# Patient Record
Sex: FEMALE | Race: WHITE | NOT HISPANIC OR LATINO | Employment: STUDENT | URBAN - METROPOLITAN AREA
[De-identification: names, ages, dates, MRNs, and addresses within clinical notes are randomized per-mention and may not be internally consistent; named-entity substitution may affect disease eponyms.]

---
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
## 2020-01-02 LAB
DONOR IDENTIFICATION: NORMAL
DSA COMMENTS: NORMAL
DSA PRESENT: NO
DSA TEST METHOD: NORMAL
ORGAN: NORMAL
SA1 CELL: NORMAL
SA1 COMMENTS: NORMAL
SA1 HI RISK ABY: NORMAL
SA1 MOD RISK ABY: NORMAL
SA1 TEST METHOD: NORMAL
SA2 CELL: NORMAL
SA2 COMMENTS: NORMAL
SA2 HI RISK ABY UA: NORMAL
SA2 MOD RISK ABY: NORMAL
SA2 TEST METHOD: NORMAL
UNACCEPTABLE ANTIGEN: NORMAL

## 2020-01-10 LAB — INTERPRETATION ECG - MUSE: NORMAL

## 2020-03-16 ENCOUNTER — TELEPHONE (OUTPATIENT)
Dept: PEDIATRIC CARDIOLOGY | Facility: CLINIC | Age: 20
End: 2020-03-16

## 2020-03-16 DIAGNOSIS — Z94.1 HEART REPLACED BY TRANSPLANT (H): Primary | ICD-10-CM

## 2020-03-16 NOTE — LETTER
March 23, 2020    From: [facility]  Solid Organ Transplant Services      To Whom It May Concern:     Laurel Santoyo is a 19 year old year old young adult who underwent heart transplant and is required to take immunosuppressive medication.  As such she currently fits into a  high risk  category for COVID-19.     For parents that work outside the home, we recommend that all reasonable accommodations be made to allow working remotely or modifying work responsibilities to protect Laurel mendoza health.     If you have any questions or concerns about the above request, please contact the Solid Organ Transplant Office at [phone].      Thank you for your partnership.    Sincerely,   [facility]   Solid Organ Transplant Services

## 2020-03-16 NOTE — TELEPHONE ENCOUNTER
I left a message with Laurel to remind her that she should get her EBV and every 3 mo transplant routine labs. I asked her to call back when she goes. The orders are in the [facility] system so she can go to any [facility] or [facility] clinic to have them drawn.

## 2020-03-16 NOTE — LETTER
March 23, 2020    From: [facility]  Solid Organ Transplant Services      To Whom It May Concern:     Laurel Santoyo is a 19 year old year old young adult who underwent heart transplant and is required to take immunosuppressive medication.  As such she currently fits into a  high risk  category for COVID-19.     For parents that work outside the home, we recommend that all reasonable accommodations be made to allow working remotely or modifying work responsibilities to protect Laurel mendoza health.     If you have any questions or concerns about the above request, please contact the Solid Organ Transplant Office at [phone].      Thank you for your partnership.    Sincerely,   [facility]   Solid Organ Transplant Services

## 2020-03-24 NOTE — TELEPHONE ENCOUNTER
I sent Ing another email to reiterate that Laurel does need labs but we would try to minimize visits overall. Ing said that Laurel will come on 3/25/20.

## 2020-03-24 NOTE — TELEPHONE ENCOUNTER
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 Students aren t doing any work for two weeks, we are going to practice  social distancing  , and we are planning for online instruction for two full weeks. It really makes no sense.    Laurel is home as of last night, and I feel it is an unnecessary risk for Mina and me to go to work right now. Overall, everyone in our home is doing great physically and mentally, and I want to keep it that way. :)    Can you please confirm my gut on this? It sounds like we may need a  doctor s note  if we stay home from the actual school building. I hope you are all well both at your home and at the clinic/hospital.     Thanks for your guidance.    --   Shaneka Santoyo    I sent the following response:  Js,     I appreciate your concern. We are supportive of families in choosing to work remotely since it is in line with CDC and [facility] guidelines to limit the spread of COVID 19. I will work on getting you a letter in the next 2-3 days. We are currently waiting for confirmation of language to use for letters to employers in order to support patients and families. All of our work is going through a command center so we have been told to hold off and that something would be coming soon.     Another thing, I have left a couple of VM for Laurel about getting labs in the past 2 weeks. She was supposed to have a follow up EBV for a positive test when we saw her in December. Now that it is later in the month, I think it would be safer for her to get her EBV and all other transplant testing that we do every 3 months at the same time so then she may not need anything additional until this summer. I think the safest thing would be for her to come to the clinic (rather than a [facility] care location) in the next week for labs, since we  have made restrictions on visitors and are limiting visits to essential. Please let me know if you or she have questions. All of the testing is ordered including a tacrolimus level so she can come whenever works from her.     Thank you and I will be in touch as soon as I have more information.     Freida    Her response:  Freida Cain, for the information.  I know she planned to come in over spring break and did not which was the first week of March. Then, she planned to go last week in [place] when everything started to happen with the uncertainty of the virus and did not. I will give her this information and encourage her to continue to work on both communicating better with you and staying on the lab schedule.     It is a fine line parenting at this point and we are working through it using our family therapist :) Mostly, we are working on helping her integrate all this newness by giving her the space to make a few mistakes but of course stepping in if needed.     I hope you are all doing well at the hospital. We are sending positive vibes!  Best,  Ing    I sent Ing a letter from [employer] on Monday 3/23 per her request. A copy of this letter is in this encounter.     She responded:    Thanks, Freida.   Shortly after I sent you the message, everything started to fall into place pretty quickly. I felt like they weren't taking it seriously enough at first, but I think the gravity of it all changed fast. I should have let you know that no teacher is working at any school now.  We all continue to do very well; I hope you and your family are as well as all the transplant team. It's a lot to process. Last week Mina and I were busy connecting with every single family regarding mental health resources, meals, and technology needs via personal phone calls. Now, on to planning our courses to be online.     I'll let you know if we need anything else, but I hope you don't hear from us except to make her next appointments. We  are grateful we have jobs that we can do from home and that are helping others. We sure appreciate all the medical personnel!  Warmly,   Ing

## 2020-03-25 ENCOUNTER — TELEPHONE (OUTPATIENT)
Dept: PEDIATRIC CARDIOLOGY | Facility: CLINIC | Age: 20
End: 2020-03-25

## 2020-03-25 DIAGNOSIS — Z94.1 HEART REPLACED BY TRANSPLANT (H): ICD-10-CM

## 2020-03-25 LAB
ALBUMIN SERPL-MCNC: 4.2 G/DL (ref 3.4–5)
ALP SERPL-CCNC: 59 U/L (ref 40–150)
ALT SERPL W P-5'-P-CCNC: 20 U/L (ref 0–50)
ANION GAP SERPL CALCULATED.3IONS-SCNC: 7 MMOL/L (ref 3–14)
AST SERPL W P-5'-P-CCNC: 14 U/L (ref 0–35)
BASOPHILS # BLD AUTO: 0 10E9/L (ref 0–0.2)
BASOPHILS NFR BLD AUTO: 0.6 %
BILIRUB SERPL-MCNC: 0.4 MG/DL (ref 0.2–1.3)
BUN SERPL-MCNC: 24 MG/DL (ref 7–30)
CALCIUM SERPL-MCNC: 9.2 MG/DL (ref 8.5–10.1)
CHLORIDE SERPL-SCNC: 104 MMOL/L (ref 96–110)
CMV IGG SERPL QL IA: 0.2 AI (ref 0–0.8)
CMV IGM SERPL QL IA: <0.2 AI (ref 0–0.8)
CO2 SERPL-SCNC: 25 MMOL/L (ref 20–32)
CREAT SERPL-MCNC: 0.74 MG/DL (ref 0.5–1)
DIFFERENTIAL METHOD BLD: ABNORMAL
EBV NA IGG SER QL IA: 0.2 AI (ref 0–0.8)
EBV VCA IGG SER QL IA: >8 AI (ref 0–0.8)
EOSINOPHIL # BLD AUTO: 0.8 10E9/L (ref 0–0.7)
EOSINOPHIL NFR BLD AUTO: 12.1 %
ERYTHROCYTE [DISTWIDTH] IN BLOOD BY AUTOMATED COUNT: 12.4 % (ref 10–15)
GFR SERPL CREATININE-BSD FRML MDRD: >90 ML/MIN/{1.73_M2}
GLUCOSE SERPL-MCNC: 104 MG/DL (ref 70–99)
HCT VFR BLD AUTO: 42.6 % (ref 35–47)
HGB BLD-MCNC: 14.1 G/DL (ref 11.7–15.7)
IMM GRANULOCYTES # BLD: 0 10E9/L (ref 0–0.4)
IMM GRANULOCYTES NFR BLD: 0 %
LYMPHOCYTES # BLD AUTO: 1.3 10E9/L (ref 0.8–5.3)
LYMPHOCYTES NFR BLD AUTO: 19.4 %
MAGNESIUM SERPL-MCNC: 1.7 MG/DL (ref 1.6–2.3)
MCH RBC QN AUTO: 28.6 PG (ref 26.5–33)
MCHC RBC AUTO-ENTMCNC: 33.1 G/DL (ref 31.5–36.5)
MCV RBC AUTO: 86 FL (ref 78–100)
MONOCYTES # BLD AUTO: 0.9 10E9/L (ref 0–1.3)
MONOCYTES NFR BLD AUTO: 12.9 %
NEUTROPHILS # BLD AUTO: 3.7 10E9/L (ref 1.6–8.3)
NEUTROPHILS NFR BLD AUTO: 55 %
NRBC # BLD AUTO: 0 10*3/UL
NRBC BLD AUTO-RTO: 0 /100
NT-PROBNP SERPL-MCNC: 70 PG/ML (ref 0–125)
PHOSPHATE SERPL-MCNC: 3.6 MG/DL (ref 2.5–4.5)
PLATELET # BLD AUTO: 239 10E9/L (ref 150–450)
POTASSIUM SERPL-SCNC: 4 MMOL/L (ref 3.4–5.3)
PROT SERPL-MCNC: 8.8 G/DL (ref 6.8–8.8)
RBC # BLD AUTO: 4.93 10E12/L (ref 3.8–5.2)
SODIUM SERPL-SCNC: 136 MMOL/L (ref 133–144)
TACROLIMUS BLD-MCNC: 6.1 UG/L (ref 5–15)
TME LAST DOSE: NORMAL H
TROPONIN I SERPL-MCNC: <0.015 UG/L (ref 0–0.04)
WBC # BLD AUTO: 6.8 10E9/L (ref 4–11)

## 2020-03-25 PROCEDURE — 86645 CMV ANTIBODY IGM: CPT | Performed by: PEDIATRICS

## 2020-03-25 PROCEDURE — 86664 EPSTEIN-BARR NUCLEAR ANTIGEN: CPT | Performed by: PEDIATRICS

## 2020-03-25 PROCEDURE — 86644 CMV ANTIBODY: CPT | Performed by: PEDIATRICS

## 2020-03-25 PROCEDURE — 80197 ASSAY OF TACROLIMUS: CPT | Performed by: PEDIATRICS

## 2020-03-25 PROCEDURE — 86665 EPSTEIN-BARR CAPSID VCA: CPT | Mod: 59 | Performed by: PEDIATRICS

## 2020-03-25 PROCEDURE — 84484 ASSAY OF TROPONIN QUANT: CPT | Performed by: PEDIATRICS

## 2020-03-25 PROCEDURE — 86665 EPSTEIN-BARR CAPSID VCA: CPT | Performed by: PEDIATRICS

## 2020-03-25 PROCEDURE — 83880 ASSAY OF NATRIURETIC PEPTIDE: CPT | Performed by: PEDIATRICS

## 2020-03-25 PROCEDURE — 36415 COLL VENOUS BLD VENIPUNCTURE: CPT | Performed by: PEDIATRICS

## 2020-03-25 PROCEDURE — 83735 ASSAY OF MAGNESIUM: CPT | Performed by: PEDIATRICS

## 2020-03-25 PROCEDURE — 80053 COMPREHEN METABOLIC PANEL: CPT | Performed by: PEDIATRICS

## 2020-03-25 PROCEDURE — 84100 ASSAY OF PHOSPHORUS: CPT | Performed by: PEDIATRICS

## 2020-03-25 PROCEDURE — 85025 COMPLETE CBC W/AUTO DIFF WBC: CPT | Performed by: PEDIATRICS

## 2020-03-25 PROCEDURE — 87799 DETECT AGENT NOS DNA QUANT: CPT | Performed by: PEDIATRICS

## 2020-03-26 LAB
CMV DNA SPEC NAA+PROBE-ACNC: NORMAL [IU]/ML
CMV DNA SPEC NAA+PROBE-LOG#: NORMAL {LOG_IU}/ML
EBV DNA # SPEC NAA+PROBE: ABNORMAL {COPIES}/ML
EBV DNA SPEC NAA+PROBE-LOG#: 5.2 {LOG_COPIES}/ML
SPECIMEN SOURCE: NORMAL

## 2020-03-26 NOTE — TELEPHONE ENCOUNTER
I called Laurel to discuss her lab results with her. Her CMP is unremarkable. Her BNP and troponin are low and she has stable WBC and hemoglobin. Her tacrolimus level is 6.1 for a 12 hour trough. She can continue to take 2 mg twice daily.     I also emailed Ing an update as she and Laurel are working together on managing her medications and appointments for the time being.    Ing,     I just went over results with Laurel but good news. Her cardiac markers are low which means her heart is doing well. Her tacrolimus level is 6.1 so she can continue on 2 mg (4 pills) twice daily. The EBV is continuing to be positive but at similar levels to December so we have no plans for additional testing or changes right now. Her viral copies were 138,000 in Dec and 143,000 now. 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 I already reviewed these with her awhile back, but we should be notified of any new fevers, aches, chills, or prominent lymph nodes.     Unless things change in the meantime, we will plan to see her in late June for her semi-annual follow up.     Thanks!    Freida

## 2020-03-27 LAB — EBV VCA IGM SER QL IA: 0.4 AI (ref 0–0.8)

## 2020-04-06 DIAGNOSIS — Z94.1 HEART REPLACED BY TRANSPLANT (H): ICD-10-CM

## 2020-04-06 DIAGNOSIS — E55.9 VITAMIN D INSUFFICIENCY: ICD-10-CM

## 2020-04-06 RX ORDER — CHOLECALCIFEROL (VITAMIN D3) 50 MCG
2000 TABLET ORAL DAILY
Qty: 90 TABLET | Refills: 3 | Status: SHIPPED | OUTPATIENT
Start: 2020-04-06 | End: 2021-07-20

## 2020-04-28 DIAGNOSIS — Z94.1 HEART TRANSPLANTED (H): ICD-10-CM

## 2020-04-28 RX ORDER — TACROLIMUS 0.5 MG/1
2 CAPSULE ORAL 2 TIMES DAILY
Qty: 720 CAPSULE | Refills: 3 | Status: SHIPPED | OUTPATIENT
Start: 2020-04-28 | End: 2021-03-05

## 2020-06-05 ENCOUNTER — TELEPHONE (OUTPATIENT)
Dept: PEDIATRIC CARDIOLOGY | Facility: CLINIC | Age: 20
End: 2020-06-05

## 2020-06-05 DIAGNOSIS — Z94.1 HEART TRANSPLANTED (H): Primary | ICD-10-CM

## 2020-06-05 NOTE — TELEPHONE ENCOUNTER
Laurel is due for her annual follow up (off schedule now due to college schedule). She called to set this up. I sent a request to Yanely to schedule Laurel later this month or the first 2 weeks of July.     All labs and imaging are ordered.

## 2020-06-15 DIAGNOSIS — K02.9 DENTAL CARIES: ICD-10-CM

## 2020-06-15 RX ORDER — AMOXICILLIN 500 MG/1
CAPSULE ORAL
Qty: 4 CAPSULE | Refills: 0 | Status: SHIPPED | OUTPATIENT
Start: 2020-06-15

## 2020-06-15 NOTE — TELEPHONE ENCOUNTER
"Requested Prescriptions   Pending Prescriptions Disp Refills     amoxicillin (AMOXIL) 500 MG capsule [Pharmacy Med Name: AMOXICILLIN 500MG CAPSULES]  Last Written Prescription Date:  12/11/2019  Last Fill Quantity: 20 tablet,  # refills: 0   Last office visit: 12/13/2019 with prescribing provider:  Dr. Farah   Future Office Visit:           4 capsule 0     Sig: TAKE 4 CAPSULES BY MOUTH ONCE FOR 1 DOSE, 30-60 MINUTES PRIOR TO DENTAL CLEANING       Oral Acne/Rosacea Medications Protocol Failed - 6/15/2020  1:22 PM        Failed - Confirmation of diagnosis is required     Please confirm diagnosis is acne or rosacea.     If NOT acne or rosacea; refer request to provider for further evaluation.    If diagnosis IS acne or rosacea, OK to refill BASED ON PREVIOUS REFILL CLINICAL NOTE RECOMMENDATION.          Failed - Medication is active on med list        Passed - Patient is 12 years of age or older        Passed - Recent (12 mo) or future (30 days) visit within the authorizing provider's specialty     Patient has had an office visit with the authorizing provider or a provider within the authorizing providers department within the previous 12 mos or has a future within next 30 days. See \"Patient Info\" tab in inbasket, or \"Choose Columns\" in Meds & Orders section of the refill encounter.              Passed - No active pregnancy on record        Passed - No positive prenancy test is past 12 months             "

## 2020-06-15 NOTE — TELEPHONE ENCOUNTER
Routing refill request to provider for review/approval because:  Drug not on the [facility] refill protocol     Misty Maldonado RN

## 2020-06-16 NOTE — TELEPHONE ENCOUNTER
I think this was a duplicate request?  I signed one request  Please route to a colleague if it is still needed - thanks  If not you can just close/ done this encounter    Misty Abbasi M.D.

## 2020-06-30 ENCOUNTER — TELEPHONE (OUTPATIENT)
Dept: PEDIATRIC CARDIOLOGY | Facility: CLINIC | Age: 20
End: 2020-06-30

## 2020-06-30 NOTE — TELEPHONE ENCOUNTER
I did Laurel's pre-visit wellness screen. I clarified her appointment start time at 9 AM in [facility]. [facility] like the visits started at 10:30. She should have labs drawn between 10-10:30 since she typically takes her tacrolimus at 10:30 AM/PM now. Laurel verbalized understanding. All orders are entered for labs and imaging.

## 2020-07-02 ENCOUNTER — HOSPITAL ENCOUNTER (OUTPATIENT)
Dept: GENERAL RADIOLOGY | Facility: CLINIC | Age: 20
End: 2020-07-02
Attending: PEDIATRICS
Payer: COMMERCIAL

## 2020-07-02 ENCOUNTER — ANCILLARY PROCEDURE (OUTPATIENT)
Dept: BONE DENSITY | Facility: CLINIC | Age: 20
End: 2020-07-02
Attending: PEDIATRICS
Payer: COMMERCIAL

## 2020-07-02 ENCOUNTER — HOSPITAL ENCOUNTER (OUTPATIENT)
Dept: ULTRASOUND IMAGING | Facility: CLINIC | Age: 20
End: 2020-07-02
Attending: PEDIATRICS
Payer: COMMERCIAL

## 2020-07-02 ENCOUNTER — RESULTS ONLY (OUTPATIENT)
Dept: OTHER | Facility: CLINIC | Age: 20
End: 2020-07-02

## 2020-07-02 ENCOUNTER — TELEPHONE (OUTPATIENT)
Dept: PEDIATRIC CARDIOLOGY | Facility: CLINIC | Age: 20
End: 2020-07-02

## 2020-07-02 ENCOUNTER — OFFICE VISIT (OUTPATIENT)
Dept: PEDIATRIC CARDIOLOGY | Facility: CLINIC | Age: 20
End: 2020-07-02
Attending: PEDIATRICS
Payer: COMMERCIAL

## 2020-07-02 ENCOUNTER — HOSPITAL ENCOUNTER (OUTPATIENT)
Dept: CT IMAGING | Facility: CLINIC | Age: 20
End: 2020-07-02
Attending: PEDIATRICS
Payer: COMMERCIAL

## 2020-07-02 ENCOUNTER — HOSPITAL ENCOUNTER (OUTPATIENT)
Dept: CARDIOLOGY | Facility: CLINIC | Age: 20
End: 2020-07-02
Attending: PEDIATRICS
Payer: COMMERCIAL

## 2020-07-02 VITALS
DIASTOLIC BLOOD PRESSURE: 81 MMHG | RESPIRATION RATE: 24 BRPM | BODY MASS INDEX: 19.36 KG/M2 | OXYGEN SATURATION: 97 % | HEART RATE: 96 BPM | WEIGHT: 116.18 LBS | HEIGHT: 65 IN | SYSTOLIC BLOOD PRESSURE: 107 MMHG

## 2020-07-02 DIAGNOSIS — Z94.1 HEART TRANSPLANTED (H): ICD-10-CM

## 2020-07-02 DIAGNOSIS — Z94.1 HEART REPLACED BY TRANSPLANT (H): ICD-10-CM

## 2020-07-02 DIAGNOSIS — Z94.1 HEART TRANSPLANTED (H): Primary | ICD-10-CM

## 2020-07-02 LAB
ALBUMIN SERPL-MCNC: 3.8 G/DL (ref 3.4–5)
ALP SERPL-CCNC: 57 U/L (ref 40–150)
ALT SERPL W P-5'-P-CCNC: 15 U/L (ref 0–50)
ANION GAP SERPL CALCULATED.3IONS-SCNC: 6 MMOL/L (ref 3–14)
AST SERPL W P-5'-P-CCNC: 10 U/L (ref 0–45)
BASOPHILS # BLD AUTO: 0.1 10E9/L (ref 0–0.2)
BASOPHILS NFR BLD AUTO: 0.9 %
BILIRUB SERPL-MCNC: 0.4 MG/DL (ref 0.2–1.3)
BUN SERPL-MCNC: 12 MG/DL (ref 7–30)
CALCIUM SERPL-MCNC: 9 MG/DL (ref 8.5–10.1)
CHLORIDE SERPL-SCNC: 104 MMOL/L (ref 94–109)
CHOLEST SERPL-MCNC: 120 MG/DL
CK SERPL-CCNC: 46 U/L (ref 30–225)
CMV IGG SERPL QL IA: <0.2 AI (ref 0–0.8)
CMV IGM SERPL QL IA: <0.2 AI (ref 0–0.8)
CO2 SERPL-SCNC: 25 MMOL/L (ref 20–32)
CREAT SERPL-MCNC: 0.67 MG/DL (ref 0.52–1.04)
DIFFERENTIAL METHOD BLD: ABNORMAL
EBV NA IGG SER QL IA: 0.4 AI (ref 0–0.8)
EBV VCA IGG SER QL IA: >8 AI (ref 0–0.8)
EBV VCA IGM SER QL IA: 0.2 AI (ref 0–0.8)
EOSINOPHIL # BLD AUTO: 0.8 10E9/L (ref 0–0.7)
EOSINOPHIL NFR BLD AUTO: 15.2 %
ERYTHROCYTE [DISTWIDTH] IN BLOOD BY AUTOMATED COUNT: 13.1 % (ref 10–15)
GFR SERPL CREATININE-BSD FRML MDRD: >90 ML/MIN/{1.73_M2}
GLUCOSE SERPL-MCNC: 85 MG/DL (ref 70–99)
HCT VFR BLD AUTO: 38.7 % (ref 35–47)
HDLC SERPL-MCNC: 53 MG/DL
HGB BLD-MCNC: 12.8 G/DL (ref 11.7–15.7)
IMM GRANULOCYTES # BLD: 0 10E9/L (ref 0–0.4)
IMM GRANULOCYTES NFR BLD: 0.2 %
INTERPRETATION ECG - MUSE: NORMAL
LDLC SERPL CALC-MCNC: 56 MG/DL
LYMPHOCYTES # BLD AUTO: 1 10E9/L (ref 0.8–5.3)
LYMPHOCYTES NFR BLD AUTO: 18.9 %
MAGNESIUM SERPL-MCNC: 1.6 MG/DL (ref 1.6–2.3)
MCH RBC QN AUTO: 29 PG (ref 26.5–33)
MCHC RBC AUTO-ENTMCNC: 33.1 G/DL (ref 31.5–36.5)
MCV RBC AUTO: 88 FL (ref 78–100)
MONOCYTES # BLD AUTO: 0.4 10E9/L (ref 0–1.3)
MONOCYTES NFR BLD AUTO: 8 %
NEUTROPHILS # BLD AUTO: 3.1 10E9/L (ref 1.6–8.3)
NEUTROPHILS NFR BLD AUTO: 56.8 %
NONHDLC SERPL-MCNC: 67 MG/DL
NRBC # BLD AUTO: 0 10*3/UL
NRBC BLD AUTO-RTO: 0 /100
NT-PROBNP SERPL-MCNC: 181 PG/ML (ref 0–125)
PHOSPHATE SERPL-MCNC: 3.6 MG/DL (ref 2.5–4.5)
PLATELET # BLD AUTO: 275 10E9/L (ref 150–450)
POTASSIUM SERPL-SCNC: 4.2 MMOL/L (ref 3.4–5.3)
PROT SERPL-MCNC: 8.1 G/DL (ref 6.8–8.8)
RBC # BLD AUTO: 4.41 10E12/L (ref 3.8–5.2)
SODIUM SERPL-SCNC: 135 MMOL/L (ref 133–144)
TACROLIMUS BLD-MCNC: 5.3 UG/L (ref 5–15)
TME LAST DOSE: NORMAL H
TRIGL SERPL-MCNC: 54 MG/DL
TROPONIN I SERPL-MCNC: <0.015 UG/L (ref 0–0.04)
WBC # BLD AUTO: 5.4 10E9/L (ref 4–11)

## 2020-07-02 PROCEDURE — 80061 LIPID PANEL: CPT | Performed by: PEDIATRICS

## 2020-07-02 PROCEDURE — 72070 X-RAY EXAM THORAC SPINE 2VWS: CPT

## 2020-07-02 PROCEDURE — 36415 COLL VENOUS BLD VENIPUNCTURE: CPT | Performed by: PEDIATRICS

## 2020-07-02 PROCEDURE — 86833 HLA CLASS II HIGH DEFIN QUAL: CPT | Performed by: PEDIATRICS

## 2020-07-02 PROCEDURE — 76770 US EXAM ABDO BACK WALL COMP: CPT

## 2020-07-02 PROCEDURE — 86664 EPSTEIN-BARR NUCLEAR ANTIGEN: CPT | Performed by: PEDIATRICS

## 2020-07-02 PROCEDURE — 80197 ASSAY OF TACROLIMUS: CPT | Performed by: PEDIATRICS

## 2020-07-02 PROCEDURE — 86665 EPSTEIN-BARR CAPSID VCA: CPT | Performed by: PEDIATRICS

## 2020-07-02 PROCEDURE — 71275 CT ANGIOGRAPHY CHEST: CPT

## 2020-07-02 PROCEDURE — 77080 DXA BONE DENSITY AXIAL: CPT

## 2020-07-02 PROCEDURE — 84100 ASSAY OF PHOSPHORUS: CPT | Performed by: PEDIATRICS

## 2020-07-02 PROCEDURE — G0463 HOSPITAL OUTPT CLINIC VISIT: HCPCS | Mod: 25,ZF

## 2020-07-02 PROCEDURE — 82550 ASSAY OF CK (CPK): CPT | Performed by: PEDIATRICS

## 2020-07-02 PROCEDURE — 83735 ASSAY OF MAGNESIUM: CPT | Performed by: PEDIATRICS

## 2020-07-02 PROCEDURE — 84484 ASSAY OF TROPONIN QUANT: CPT | Performed by: PEDIATRICS

## 2020-07-02 PROCEDURE — 86645 CMV ANTIBODY IGM: CPT | Performed by: PEDIATRICS

## 2020-07-02 PROCEDURE — 87799 DETECT AGENT NOS DNA QUANT: CPT | Performed by: PEDIATRICS

## 2020-07-02 PROCEDURE — 25000128 H RX IP 250 OP 636: Performed by: PEDIATRICS

## 2020-07-02 PROCEDURE — 83880 ASSAY OF NATRIURETIC PEPTIDE: CPT | Performed by: PEDIATRICS

## 2020-07-02 PROCEDURE — 80053 COMPREHEN METABOLIC PANEL: CPT | Performed by: PEDIATRICS

## 2020-07-02 PROCEDURE — 85025 COMPLETE CBC W/AUTO DIFF WBC: CPT | Performed by: PEDIATRICS

## 2020-07-02 PROCEDURE — 25000125 ZZHC RX 250: Performed by: PEDIATRICS

## 2020-07-02 PROCEDURE — 93005 ELECTROCARDIOGRAM TRACING: CPT | Mod: ZF

## 2020-07-02 PROCEDURE — 86644 CMV ANTIBODY: CPT | Performed by: PEDIATRICS

## 2020-07-02 PROCEDURE — 71046 X-RAY EXAM CHEST 2 VIEWS: CPT

## 2020-07-02 PROCEDURE — 72100 X-RAY EXAM L-S SPINE 2/3 VWS: CPT

## 2020-07-02 PROCEDURE — 93306 TTE W/DOPPLER COMPLETE: CPT

## 2020-07-02 PROCEDURE — 86832 HLA CLASS I HIGH DEFIN QUAL: CPT | Performed by: PEDIATRICS

## 2020-07-02 PROCEDURE — 73523 X-RAY EXAM HIPS BI 5/> VIEWS: CPT

## 2020-07-02 RX ORDER — IOPAMIDOL 755 MG/ML
100 INJECTION, SOLUTION INTRAVASCULAR ONCE
Status: COMPLETED | OUTPATIENT
Start: 2020-07-02 | End: 2020-07-02

## 2020-07-02 RX ADMIN — IOPAMIDOL 100 ML: 755 INJECTION, SOLUTION INTRAVENOUS at 11:35

## 2020-07-02 RX ADMIN — SODIUM CHLORIDE 70 ML: 9 INJECTION, SOLUTION INTRAVENOUS at 11:36

## 2020-07-02 ASSESSMENT — MIFFLIN-ST. JEOR: SCORE: 1297.26

## 2020-07-02 ASSESSMENT — PAIN SCALES - GENERAL: PAINLEVEL: NO PAIN (0)

## 2020-07-02 NOTE — NURSING NOTE
It is a pleasure to see Laurel today. She is here on her own. She said that she has been taking care of most of her medical needs in the past 6-8 months and feels good doing so. She has a few concerns.     She has tremors which she thinks are stress and anxiety related but her mom thinks may be due to medication. We discussed this at length. She has tremors and persistent headaches. She thinks they are worse with stress. She has tremors when at rest, with using the computer, and doing fine detail work. When she is painting or writing, it is better.     She saw the dentist and they are concerned about the enamel on her molars. They would like to start with 2 crowns and then may need to do additional. She had questions about precautions if this is ok.    She would like to follow up with gynecology. I will provide her the number to call to set this up. Will also plan for dermatology follow up in December with our follow up visit.

## 2020-07-02 NOTE — NURSING NOTE
"Chief Complaint   Patient presents with     Heart Problem     Heart transplant.     Vitals:    07/02/20 0901   BP: 107/81   BP Location: Right arm   Patient Position: Chair   Pulse: 96   Resp: 24   SpO2: 97%   Weight: 116 lb 2.9 oz (52.7 kg)   Height: 5' 4.96\" (165 cm)      Fartun Licona M.A.  July 2, 2020  "

## 2020-07-02 NOTE — LETTER
2020      RE: Laurel Santoyo  [address] [address]       Misty Abbasi MD   [facility]   [facility] [facility] [place] [address]      RE: Laurel Santoyo  MRN: [id number]  : 2000     Dear Dr. Abbasi:     We had the pleasure of seeing your patient, Laurel Santoyo, in the Pediatric Heart Transplant Clinic at the [facility] on 2020 for her routine post-transplant followup now 16 1/2 years after transplant.  As you know, she is now a 20 year old with a history of complex congenital heart disease s/p multiple palliative surgeries, who underwent orthotopic heart transplant on 2004.      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  She underwent heart cath in 2011 for concern over aortic arch narrowing by echo, and heart cath was complicated by episode of complete heart block requiring cpr with spontaneous recovery of rhythm.  She was found to have both ascending aortic narrowing at anastomotic site as well as mild descending aortic narrowing.  She underwent stent placement in her coarctation on 2012, at which time she was also found to have mild coronary vasculopathy.  She was started on sirolimus (rapamune) on 12 because of the coronary findings, and once her level was therapeutic her tacrolimus level was discontinued.  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  She recovered well after stopping these medications and treating with tpn and antibiotics, and was discharged on 12.      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     Today she is in clinic for routine followup. She reports no concerns on a cardiac or respiratory basis.  She is feeling well, good energy, active. She has had a difficult fall with starting school at [employer], but dealing with a lot of issues with anxiety/depression and so decided to take the fall semester off,  returned to school in January since she was in better mental place but then returned home in March due to Covid. She is now taking some online classes at [employer] and is planning to continue this for fall semester and then go back to [employer] in spring. She continues to have some issue with headaches, anxiety/depression and is working with a therapist. She feels these are probably a result of school stressors as well as stressors with her family. She has not had any fever, abdominal cramps/pain, diarrhea. She does complain of some tremors that are new.  Comprehensive review of systems is otherwise negative today.  There have been no changes to the past medical, family, or social history other than as noted above.     Current medications include:   Prescription Medications as of 7/2/2020       Rx Number Disp Refills Start End Last Dispensed Date Next Fill Date Owning Pharmacy    amoxicillin (AMOXIL) 500 MG capsule  4 capsule 0 6/15/2020    [facility] #[id number] - [place], [place] - [address] AT [facility]    Sig: TAKE 4 CAPSULES BY MOUTH ONCE FOR 1 DOSE, 30-60 MINUTES PRIOR TO DENTAL CLEANING    Class: E-Prescribe    aspirin EC 81 MG EC tablet  30 tablet 6 7/12/2011    [facility]    Sig: Take 1 tablet by mouth daily.    Class: OTC    Route: Oral    FLUoxetine (PROZAC) 20 MG capsule    12/26/2019    [facility] #[id number] - [place], [place] - [address] AT [facility]    Sig: Take 40 mg by mouth daily    Class: Historical    Route: Oral     "lisinopril (PRINIVIL/ZESTRIL) 10 MG tablet  90 tablet 3 10/22/2019    [facility] [address]    Sig: Take 1 tablet (10 mg) by mouth daily    Class: E-Prescribe    Route: Oral    magnesium oxide (MAG-OX) 400 (241.3 Mg) MG tablet  90 tablet 3 12/26/2019    [facility] #[id number] - [place], [place] - [address] AT [facility] [address]    Sig: Take 1 tablet (400 mg) by mouth daily    Class: E-Prescribe    Route: Oral    multivitamin, therapeutic with minerals (MULTI-VITAMIN) TABS tablet  30 each 0 8/24/2017    [facility] [address]    Sig: Take 1 tablet by mouth daily    Class: OTC    Route: Oral    Cosign for Ordering: Accepted by Misty Linton MD on 8/25/2017  7:24 AM    simvastatin (ZOCOR) 5 MG tablet  90 tablet 6 7/19/2019    [facility] [address]    Sig: Take 1 tablet (5 mg) by mouth every evening    Class: E-Prescribe    Route: Oral    tacrolimus (GENERIC EQUIVALENT) 0.5 MG capsule  720 capsule 3 4/28/2020    [facility] [address]    Sig: Take 4 capsules (2 mg) by mouth 2 times daily Take 2 mg (4 capsules) twice daily    Class: E-Prescribe    Route: Oral    vitamin D3 (CHOLECALCIFEROL) 2000 units (50 mcg) tablet  90 tablet 3 4/6/2020    [facility] [address]    Sig: Take 1 tablet (2,000 Units) by mouth daily    Class: E-Prescribe    Route: Oral        On exam today, she is awake, alert, in no acute distress. Vital signs include: /81 (BP Location: Right arm, Patient Position: Chair)   Pulse 96   Resp 24   Ht 1.65 m (5' 4.96\")   Wt 52.7 kg (116 lb 2.9 oz)   SpO2 97%   BMI 19.36 kg/m    Her lungs are clear to auscultation bilaterally with no wheezes, rales or rhonchi.  Cardiovascular exam is notable for a regular rate and rhythm with " a normal S1 and normal physiologically splitting S2.  There is a grade 2/6 systolic ejection murmur at the left upper sternal border and left upper back.  There are no rubs or gallops on exam today.  Abdomen is soft, nontender..  Extremities are warm and well perfused with no peripheral clubbing, edema, or cyanosis.  There is very subtle radial femoral pulse delay with 1+ femoral pulses.  The exam is otherwise unremarkable.       Laurel had evaluation today including labs, imaging, echocardiogram, ecg.   Her echocardiogram showed:  Patient after orthotopic heart transplant. Normal right and left ventricular systolic function. The calculated biplane left ventricular ejection fraction is 62 %. LVRI 0.6. Upper mild mitral valve insufficiency. Mild aortic valve insufficiency. Stent in the abdominal aorta next to the celiac artery. There is blunted systolic upstroke Doppler flow pattern with diastolic run-off in the descending abdominal aorta. In the proximal descending thoracic aorta there is a peak gradient of 32 mm Hg with a mean gradient of 8 mmHg. No pericardial effusion.  No significant change from last echocardiogram.    Her labs returned after her visit:  She had a comprehensive metabolic panel which was normal, specifically the bun was 12 and creatinine of 0.67.  Her magnesium level was normal at 1.6.  Her LFTs were normal.  She had a pro-bnp of 181 and troponin of <0.015.  She has a tacrolimus level that is pending.  She had a cbc remarkable for normal wbc of 5.4, hemoglobin normal at 12.8, and platelets normal at [id number].     Labs from 3/25/20: CMV negative, EBV positive at 425924, log 5.2.  She had ebv and cmv pcr that are pending.     Her cholesterol panel was normal, total cholesterol 120, HDL 53, LDL 56, triglycerides 54.     Her cxr, T&L spine films, hip films were normal today.     CT from today showed:  1. Postoperative changes of cardiac transplantation with stable caliber change at the aortic  anastomosis.  2. Stable migrated aortic stent at the level of the celiac artery without associated thrombus.  3. Stable emphysematous changes in the left lower lobe.    Proximal arch: 2.3 x 2.2 cm  Proximal arch at anastomotic site: 1.6 x 1.3 cm, grossly stable  Mid arch: 1.5 x 1.7 cm   Isthmus: 1.7 x 1.5 cm, previously 1.3 x 1.3 cm  Proximal descending aorta: 2.7 x 2.8 cm  Distal descending aorta: 1.9 x 1.9 cm    Her PRA are as follows:  12/26/2019: no donor specific antibodies  8/12/2019: donor specific antibodies to DR8 ()  3/4/19: donor specific antibodies to DR8 ()  8/16/18: donor specific antibodies to DR8 (MFI 1187)  3/5/18: donor specific antibodies to DR 8 ()  8/24/17: no donor specific antibodies  3/6/17: donor specific antibodies to DR4 (MFI 3707)  8/3/16: donor specific antibodies to DR4 (MFI 2756)  8/10/15: donor specific antibodies to DR4 (MFI 2785), DPB1*04:01 (MFI 1478)  2/12/15: donor specific antibodies to: DR4 mcc=4050, DPB1*04:01 opb=814   8/18/14:  donor specific antibodies to DR4 with MFI 2325 (down from 2/13/14 MFI of 3184), DPB1*04:01 MFI 1537 and B44 [id number].     Her last biopsy was 7/11/11 and showed negative C3d/C4d staining, no evidence of rejection grade 0.      Laurel is now 16 1/2 years out from orthotopic heart transplant, which was performed on February 17, 2004.  She also underwent subaortic membrane resection on November 12, 2008.  She has had no recurrence of her subaortic membrane. She also underwent coarctation stent placement on 8/14/2012, and unfortunately on followup on 11/5/12 was found to have stent migration to abdominal aorta, although not thought to be causing any obstruction to vessels by CT angiography.  She also was diagnosed with severe colitis, likely secondary to cellcept, that has resolved now off cellcept therapy, however coincidentally she was recently converted to rapamune which may have been contributory as well.  We have continued to  maintain her for now off cellcept and rapamune, back on tacrolimus.  Overall she is doing well from a cardiac standpoint at this time with no signs of rejection, with stable mild narrowing in her distal aortic arch. .     Her new tremor is more likely related to her psych medications - will refer back to psychiatry for this and possible medication adjustments.     Recommendations today:  1. Follow Up appt: 6 months with labs and office visit to include ECHO and EKG - Please call Yanely at [phone] to schedule. We will add a dermatology visit to this appointment to do a skin check.  2. Every 3 month transplant labs due September/ early October   3. Dental work: Fine to have crowns done in office. If they would like to do additional surgical work requiring a sedated procedure, then you would need to have it done at [facility] with cardiac anesthesia    4. Tremors: More likely due to anxiety and medication side effects than a cardiac/transplant issue. It is unusual to develop this as a side effect from tacrolimus when you have been on it long term. Please follow up with psychiatry to address this and other concerns.   5. Call [facility] to set up appointment [phone]. Any provider is fine.  6. Transplant office will call you with lab results     Thank you for allowing us to see Laurel and participate in her care.  Please let us know if you have any questions.     Diagnosis summary:  1. 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 Original diagnosis: double inlet left ventricle, d-TGA                     1. S/p PA band and tricuspid valve repair (6/2000)                     2. Developed subaortic obstruction and underwent DKS, atrial septectomy, AV valve repair and BT shunt placement, postoperative ECMO (2/2001)                     3. Shunt revision and PA plasty with postoperative ECMO (8/2001)           B. Had ongoing moderate to severe AV valve  regurgitation and was felt to be poor single ventricle                            candidate so was referred to transplant  2. 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 S/p resection (11/12/2008)  3. Aortic arch narrowing            A. S/p stent placement (8/14/2012), stent found on 11/5/12 to have migrated to abdominal aorta  4. 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 cellcept induced colitis (diagnosed 11/5/12), now resolved off cellcept.  6. Iron deficiency anemia (diagnosed 8/3/16, resolved but now new anemia again summer 2019 )  7. EBV viremia (diagnosed December 2019, stable level March 2020)      Sincerely,      Misty Linton M.D.,    in Pediatrics        CC  Patient Care Team:  Misty Abbasi MD as PCP - General (Pediatrics)  Amelie, Andrews Mcgrath, PhD LP (Neuropsychology)  Lynn Lee MD as Physician  Sofi, Olya Chaparro MD as Assigned PCP  Yanely Manning MA as Medical Assistant (Transplant Surgery)    Copy to patient  CARLITA BASURTO  [address] [address]

## 2020-07-02 NOTE — PROGRESS NOTES
Misty Abbasi MD   [facility]   [address] [place] [id number]      RE: Laurel Santoyo  MRN: [id number]  : 2000     Dear Dr. Abbasi:     We had the pleasure of seeing your patient, Laurel Santoyo, in the Pediatric Heart Transplant Clinic at the [facility] on 2020 for her routine post-transplant followup now 16 1/2 years after transplant.  As you know, she is now a 20 year old with a history of complex congenital heart disease s/p multiple palliative surgeries, who underwent orthotopic heart transplant on 2004.      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  She underwent heart cath in 2011 for concern over aortic arch narrowing by echo, and heart cath was complicated by episode of complete heart block requiring cpr with spontaneous recovery of rhythm.  She was found to have both ascending aortic narrowing at anastomotic site as well as mild descending aortic narrowing.  She underwent stent placement in her coarctation on 2012, at which time she was also found to have mild coronary vasculopathy.  She was started on sirolimus (rapamune) on 12 because of the coronary findings, and once her level was therapeutic her tacrolimus level was discontinued.  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  She recovered well after stopping these medications and treating with tpn and antibiotics, and was discharged on 12.      She was diagnosed with EBV viremia in 2019, and has had ongoing EBV Viremia that we are following every 3 months.     Today she  is in clinic for routine followup. She reports no concerns on a cardiac or respiratory basis.  She is feeling well, good energy, active. She has had a difficult fall with starting school at [employer], but dealing with a lot of issues with anxiety/depression and so decided to take the fall semester off,  returned to school in January since she was in better mental place but then returned home in March due to Covid. She is now taking some online classes at [employer] and is planning to continue this for fall semester and then go back to [employer] in spring. She continues to have some issue with headaches, anxiety/depression and is working with a therapist. She feels these are probably a result of school stressors as well as stressors with her family. She has not had any fever, abdominal cramps/pain, diarrhea. She does complain of some tremors that are new.  Comprehensive review of systems is otherwise negative today.  There have been no changes to the past medical, family, or social history other than as noted above.     Current medications include:   Prescription Medications as of 7/2/2020       Rx Number Disp Refills Start End Last Dispensed Date Next Fill Date Owning Pharmacy    amoxicillin (AMOXIL) 500 MG capsule  4 capsule 0 6/15/2020    [id number] - [place], [place] - [address] AT [facility]    Sig: TAKE 4 CAPSULES BY MOUTH ONCE FOR 1 DOSE, 30-60 MINUTES PRIOR TO DENTAL CLEANING    Class: E-Prescribe    aspirin EC 81 MG EC tablet  30 tablet 6 7/12/2011    [facility]    Sig: Take 1 tablet by mouth daily.    Class: OTC    Route: Oral    FLUoxetine (PROZAC) 20 MG capsule    12/26/2019    [id number] - [place], [place] - [address] AT [facility]    Sig: Take 40 mg by mouth daily    Class: Historical    Route: Oral    lisinopril (PRINIVIL/ZESTRIL) 10 MG tablet  90 tablet 3 10/22/2019    [facility] [place] [address]    Sig: Take 1 tablet (10 mg) by mouth daily    Class: E-Prescribe    Route: Oral    magnesium oxide (MAG-OX) 400 (241.3 Mg) MG tablet  90 tablet 3 12/26/2019    [facility][id number] - [place], [place] - [address] AT [facility] [address]    Sig: Take 1 tablet (400 mg) by mouth daily    Class: E-Prescribe    Route: Oral    multivitamin, therapeutic with minerals (MULTI-VITAMIN) TABS tablet  30 each 0 8/24/2017    [facility] [address]    Sig: Take 1 tablet by mouth daily    Class: OTC    Route: Oral    Cosign for Ordering: Accepted by Misty Linton MD on 8/25/2017  7:24 AM    simvastatin (ZOCOR) 5 MG tablet  90 tablet 6 7/19/2019    [facility] [address]    Sig: Take 1 tablet (5 mg) by mouth every evening    Class: E-Prescribe    Route: Oral    tacrolimus (GENERIC EQUIVALENT) 0.5 MG capsule  720 capsule 3 4/28/2020    [facility] [address]    Sig: Take 4 capsules (2 mg) by mouth 2 times daily Take 2 mg (4 capsules) twice daily    Class: E-Prescribe    Route: Oral    vitamin D3 (CHOLECALCIFEROL) 2000 units (50 mcg) tablet  90 tablet 3 4/6/2020    [facility] [address]    Sig: Take 1 tablet (2,000 Units) by mouth daily    Class: E-Prescribe    Route: Oral        On exam today, she is awake, alert, in no acute distress. Vital signs include: /81 (BP Location: Right arm, Patient Position: Chair)   Pulse 96   Resp 24   Ht 1.65 m (5' 4.96\")   Wt 52.7 kg (116 lb 2.9 oz)   SpO2 97%   BMI 19.36 kg/m    Her lungs are clear to auscultation bilaterally with no wheezes, rales or rhonchi.  Cardiovascular exam is notable for a regular rate and rhythm with a normal S1 and normal physiologically splitting S2.  There is a grade 2/6 systolic ejection " murmur at the left upper sternal border and left upper back.  There are no rubs or gallops on exam today.  Abdomen is soft, nontender..  Extremities are warm and well perfused with no peripheral clubbing, edema, or cyanosis.  There is very subtle radial femoral pulse delay with 1+ femoral pulses.  The exam is otherwise unremarkable.       Laurel had evaluation today including labs, imaging, echocardiogram, ecg.   Her echocardiogram showed:  Patient after orthotopic heart transplant. Normal right and left ventricular systolic function. The calculated biplane left ventricular ejection fraction is 62 %. LVRI 0.6. Upper mild mitral valve insufficiency. Mild aortic valve insufficiency. Stent in the abdominal aorta next to the celiac artery. There is blunted systolic upstroke Doppler flow pattern with diastolic run-off in the descending abdominal aorta. In the proximal descending thoracic aorta there is a peak gradient of 32 mm Hg with a mean gradient of 8 mmHg. No pericardial effusion.  No significant change from last echocardiogram.    Her labs returned after her visit:  She had a comprehensive metabolic panel which was normal, specifically the bun was 12 and creatinine of 0.67.  Her magnesium level was normal at 1.6.  Her LFTs were normal.  She had a pro-bnp of 181 and troponin of <0.015.  She has a tacrolimus level that is pending.  She had a cbc remarkable for normal wbc of 5.4, hemoglobin normal at 12.8, and platelets normal at 284550.     Labs from 3/25/20: CMV negative, EBV positive at 176312, log 5.2.  She had ebv and cmv pcr that are pending.     Her cholesterol panel was normal, total cholesterol 120, HDL 53, LDL 56, triglycerides 54.     Her cxr, T&L spine films, hip films were normal today.     CT from today showed:  1. Postoperative changes of cardiac transplantation with stable caliber change at the aortic anastomosis.  2. Stable migrated aortic stent at the level of the celiac artery without associated  thrombus.  3. Stable emphysematous changes in the left lower lobe.    Proximal arch: 2.3 x 2.2 cm  Proximal arch at anastomotic site: 1.6 x 1.3 cm, grossly stable  Mid arch: 1.5 x 1.7 cm   Isthmus: 1.7 x 1.5 cm, previously 1.3 x 1.3 cm  Proximal descending aorta: 2.7 x 2.8 cm  Distal descending aorta: 1.9 x 1.9 cm    Her PRA are as follows:  12/26/2019: no donor specific antibodies  8/12/2019: donor specific antibodies to DR8 ()  3/4/19: donor specific antibodies to DR8 ()  8/16/18: donor specific antibodies to DR8 (MFI 1187)  3/5/18: donor specific antibodies to DR 8 ()  8/24/17: no donor specific antibodies  3/6/17: donor specific antibodies to DR4 (MFI 3707)  8/3/16: donor specific antibodies to DR4 (MFI 2756)  8/10/15: donor specific antibodies to DR4 (MFI 2785), DPB1*04:01 (MFI 1478)  2/12/15: donor specific antibodies to: DR4 ibd=6170, DPB1*04:01 foh=241   8/18/14:  donor specific antibodies to DR4 with MFI 2325 (down from 2/13/14 MFI of 3184), DPB1*04:01 MFI 1537 and B44 QMA693.     Her last biopsy was 7/11/11 and showed negative C3d/C4d staining, no evidence of rejection grade 0.      Laurel is now 16 1/2 years out from orthotopic heart transplant, which was performed on February 17, 2004.  She also underwent subaortic membrane resection on November 12, 2008.  She has had no recurrence of her subaortic membrane. She also underwent coarctation stent placement on 8/14/2012, and unfortunately on followup on 11/5/12 was found to have stent migration to abdominal aorta, although not thought to be causing any obstruction to vessels by CT angiography.  She also was diagnosed with severe colitis, likely secondary to cellcept, that has resolved now off cellcept therapy, however coincidentally she was recently converted to rapamune which may have been contributory as well.  We have continued to maintain her for now off cellcept and rapamune, back on tacrolimus.  Overall she is doing well from a  cardiac standpoint at this time with no signs of rejection, with stable mild narrowing in her distal aortic arch. .     Her new tremor is more likely related to her psych medications - will refer back to psychiatry for this and possible medication adjustments.     Recommendations today:  1. Follow Up appt: 6 months with labs and office visit to include ECHO and EKG - Please call Yanely at [phone] to schedule. We will add a dermatology visit to this appointment to do a skin check.  2. Every 3 month transplant labs due September/ early October   3. Dental work: Fine to have crowns done in office. If they would like to do additional surgical work requiring a sedated procedure, then you would need to have it done at [facility] with cardiac anesthesia    4. Tremors: More likely due to anxiety and medication side effects than a cardiac/transplant issue. It is unusual to develop this as a side effect from tacrolimus when you have been on it long term. Please follow up with psychiatry to address this and other concerns.   5. Call [facility] to set up appointment [phone]. Any provider is fine.  6. Transplant office will call you with lab results     Thank you for allowing us to see Laurel and participate in her care.  Please let us know if you have any questions.     Diagnosis summary:  1. 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 Original diagnosis: double inlet left ventricle, d-TGA                     1. S/p PA band and tricuspid valve repair (6/2000)                     2. Developed subaortic obstruction and underwent DKS, atrial septectomy, AV valve repair and BT shunt placement, postoperative ECMO (2/2001)                     3. Shunt revision and PA plasty with postoperative ECMO (8/2001)           B. Had ongoing moderate to severe AV valve regurgitation and was felt to be poor single ventricle                            candidate so was referred to  transplant  2. 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 S/p resection (11/12/2008)  3. Aortic arch narrowing            A. S/p stent placement (8/14/2012), stent found on 11/5/12 to have migrated to abdominal aorta  4. 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 cellcept induced colitis (diagnosed 11/5/12), now resolved off cellcept.  6. Iron deficiency anemia (diagnosed 8/3/16, resolved but now new anemia again summer 2019 )  7. EBV viremia (diagnosed December 2019, stable level March 2020)      Sincerely,      Misty Linton M.D.,    in Pediatrics        CC  Patient Care Team:  Misty Abbasi MD as PCP - General (Pediatrics)  Misty Linton MD as MD (Pediatric Cardiology)  Misty Abbasi MD as Referring Physician (Pediatrics)  Andrews Kinsey, PhD LP (Neuropsychology)  Lynn Lee MD as Physician (Internal Medicine)  Lynn Lee MD as Physician  Olya Farah MD as Assigned PCP  Yanely Manning MA as Medical Assistant (Transplant Surgery)  MISTY ABBASI    Copy to patient  CARLITA MORGAN SHERINE  [address] [facility] [id number]

## 2020-07-02 NOTE — PATIENT INSTRUCTIONS
Plan:   1. Follow Up appt: 6 months with labs and office visit to include ECHO and EKG - Please call Yanely at [phone] to schedule. We will add a dermatology visit to this appointment to do a skin check.  2. Every 3 month transplant labs due September/ early October   3. Dental work: Fine to have crowns done in office. If they would like to do additional surgical work requiring a sedated procedure, then you would need to have it done at [facility] with cardiac anesthesia    4. Tremors: More likely due to anxiety and medication side effects than a cardiac/transplant issue. It is unusual to develop this as a side effect from tacrolimus when you have been on it long term. Please follow up with psychiatry to address this and other concerns.   5. Call [facility] to set up appointment [phone]. Any provider is fine.  6. Transplant office will call you with lab results     Please call your transplant coordinator at the Transplant office M-F from 8 AM - 4:30 PM if you have future questions/concerns or change in status such as:    Fever above 100.4    High heart rate   Nausea/vomitting   Fatigue or generally feeling unwell  Mary Jo Pope: [phone] or Freida Nunez: [phone].   For after hour and weekend concerns please page on-call transplant coordinator at [phone] Job Code Pager [phone]    For emergencies call 911 AND call Transplant coordinator on-call at [phone] Job Code Pager [phone]

## 2020-07-03 LAB
CMV DNA SPEC NAA+PROBE-ACNC: NORMAL [IU]/ML
CMV DNA SPEC NAA+PROBE-LOG#: NORMAL {LOG_IU}/ML
EBV DNA # SPEC NAA+PROBE: ABNORMAL {COPIES}/ML
EBV DNA SPEC NAA+PROBE-LOG#: 5.7 {LOG_COPIES}/ML
SPECIMEN SOURCE: NORMAL

## 2020-07-03 NOTE — TELEPHONE ENCOUNTER
I called Laurel back to discuss lab and imaging results. Her renal, heart, and liver function appear to be stable on lab work. She has a WBC of 5.4 and her tacrolimus level is 5.3 with a 12 hour trough (timing in Epic incorrect, last dose 7/1 at 2230). Her imaging, including xrays, renal US, and Dexa, show stable bone density and no concerns. Her CTA was stable from last year per Dr Linton and does not warrant any further cardiac work up.     Her EBV PCR is elevated to over 225643, from 143,852 in March. Per Dr Linton, we will recheck in 1 month and refer to Dr Stevens if the level continues to be elevated. I discussed the significant of EBV viremia with Laurel. She was concerned because she saw a friend with mono earlier this week but was socially distant. I told her that this encounter, especially since she was practicing good infection prevention, was unlikely to cause the viral load to increase. I instructed her to call or page with fevers, fatigue, or swollen lymph nodes.

## 2020-07-07 NOTE — TELEPHONE ENCOUNTER
I called and left Laurel a message with her PRA results. She has no DSA. I told her that from a heart perspective, her visit and labs were reassuring but that we would need to continue to monitor the EBV. She will have a repeat level at the beginning of August.

## 2020-08-07 ENCOUNTER — TELEPHONE (OUTPATIENT)
Dept: PEDIATRIC CARDIOLOGY | Facility: CLINIC | Age: 20
End: 2020-08-07

## 2020-08-07 NOTE — LETTER
August 7, 2020    From: [facility]  Solid Organ Transplant Services      To Whom It May Concern:     Laurel Santoyo is a 20 year old year old who underwent heart transplant and is required to take immunosuppressive medication.  As such she currently fits into a  high risk  category for COVID-19. Laurel is currently living with her parents due to COVID-19 and being unable to return to college.    For parents that work outside the home, we recommend that all reasonable accommodations be made to allow working remotely or modifying work responsibilities to protect their child s health.     If you have any questions or concerns about the above request, please contact the Solid Organ Transplant Office at [phone].      Thank you for your partnership.      Sincerely,   [facility]   Solid Organ Transplant Services

## 2020-08-12 NOTE — TELEPHONE ENCOUNTER
I called Laurel to remind her that she is due for repeat EBV testing with an elevated EBV last month. Plan is to refer to heme onc and ID if it remains high. She has not had fevers or any concerns of new lumps or other symptoms. She said that she will go in to get this done (likely at [facility]).     Ing contacted me by email because Laurel will be living at home this fall again. They feel like this is the right choice for her but want to make sure that she is safe. 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 I sent an updated parent letter for work restrictions. I also told her that the letter being sent for patients with considerations about return to school may be helpful for her and Mina. 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

## 2020-08-18 DIAGNOSIS — Z94.1 HEART REPLACED BY TRANSPLANT (H): ICD-10-CM

## 2020-08-18 RX ORDER — SIMVASTATIN 5 MG
5 TABLET ORAL EVERY EVENING
Qty: 90 TABLET | Refills: 3 | Status: SHIPPED | OUTPATIENT
Start: 2020-08-18 | End: 2021-06-24 | Stop reason: ALTCHOICE

## 2020-08-28 ENCOUNTER — TELEPHONE (OUTPATIENT)
Dept: PEDIATRIC CARDIOLOGY | Facility: CLINIC | Age: 20
End: 2020-08-28

## 2020-09-02 DIAGNOSIS — Z94.1 HEART TRANSPLANTED (H): ICD-10-CM

## 2020-09-02 PROCEDURE — 86664 EPSTEIN-BARR NUCLEAR ANTIGEN: CPT | Performed by: PEDIATRICS

## 2020-09-02 PROCEDURE — 86665 EPSTEIN-BARR CAPSID VCA: CPT | Mod: 59 | Performed by: PEDIATRICS

## 2020-09-02 PROCEDURE — 87799 DETECT AGENT NOS DNA QUANT: CPT | Performed by: PEDIATRICS

## 2020-09-02 PROCEDURE — 86665 EPSTEIN-BARR CAPSID VCA: CPT | Performed by: PEDIATRICS

## 2020-09-02 PROCEDURE — 36415 COLL VENOUS BLD VENIPUNCTURE: CPT | Performed by: PEDIATRICS

## 2020-09-03 LAB
EBV DNA # SPEC NAA+PROBE: ABNORMAL {COPIES}/ML
EBV DNA SPEC NAA+PROBE-LOG#: 5.4 {LOG_COPIES}/ML
EBV NA IGG SER QL IA: 0.8 AI (ref 0–0.8)
EBV VCA IGG SER QL IA: >8 AI (ref 0–0.8)
EBV VCA IGM SER QL IA: 0.2 AI (ref 0–0.8)

## 2020-09-04 ENCOUNTER — TELEPHONE (OUTPATIENT)
Dept: PEDIATRIC CARDIOLOGY | Facility: CLINIC | Age: 20
End: 2020-09-04

## 2020-09-04 DIAGNOSIS — Z94.1 HEART REPLACED BY TRANSPLANT (H): Primary | ICD-10-CM

## 2020-09-04 NOTE — TELEPHONE ENCOUNTER
I called Laurel to remind her that she needs labs. I have left 2 messages on her voicemail. I asked her to check if she could find them and let me know if there is a better way to contact her. She said that she can go for labs in the next week.     I also asked her about tacrolimus and refills with specialty pharmacy. We received a message that she had not refilled in over a month and that she did not call back when they left a message. She said that she is taking her tacrolimus twice a day at 4 - 0.5 mg capsules. She has not missed a dose that she knows of but has plenty of medication left. I gave her the transplant number for the pharmacy and reminded her to give enough time for refills. She verbalized understanding.

## 2020-09-04 NOTE — TELEPHONE ENCOUNTER
Carlita called and updated on whole blood EBV PCR results which are down, IgM stable at 0.2. EBV plasma PCR still in process and will update her when results are back.     Left message for Carlita instructing her to call transplant office with questions.     Results for SHERINECARLITA (MRN [id number]) as of 9/4/2020 10:17   Ref. Range 7/2/2020 10:08 9/2/2020 11:38   EBV DNA Copies/mL Latest Ref Range: EBVNEG^EBV DNA Not Detected Copies/mL 549,569 (A) 254,342 (A)   EBV DNA Log of Copies Latest Ref Range: <2.7 Log_copies/mL 5.7 (H) 5.4 (H)       Mary Jo Pope, RN, BSN  Pediatric Heart Transplant, Heart Failure, and VAD Coordinator  [facility]  Phone: [phone]  Pager: [phone]  Heart Transplant Coordinator On-Call: [phone] Job Code Pager [phone]

## 2020-09-08 LAB
DIAGNOSTIC IMP SPEC-IMP: DETECTED
EBV DNA # SPEC NAA+PROBE: ABNORMAL CPY/ML
EBV DNA SPEC NAA+PROBE-LOG#: 3.5 LOG
SPECIMEN SOURCE: ABNORMAL

## 2020-09-09 NOTE — TELEPHONE ENCOUNTER
I left 2 messages for Laurel and one for Ing on 9/8 and 9/9. Laurel called back on 9/9/20. We discussed that her EBV PCR is continued to be elevated but is lower than it was with the last check. Her plasma EBV is also elevated to 2840 which is more concerning. We contacted our EBV/PTLD team, Dr Stevens and Dr Restrepo. They agreed that she probably warrants further work up. They have a joint clinic on Tuesday Sept 15 and October 20. They may recommend additional testing and imaging. I discussed this all with Laurel. She continues to be asymptomatic with no fevers, unexplained symptoms, or enlarged lymph nodes. Laurel is available on Sept 15 so I will have the  contact her. We reviewed that the primary concern for EBV viremia post-transplant is PTLD. Laurel verbalized understanding. I reminded her to call with any questions or concerns.     She is due for transplant labs at the end of September or early October. These are ordered per Dr Linton.

## 2020-09-15 ENCOUNTER — OFFICE VISIT (OUTPATIENT)
Dept: PEDIATRIC HEMATOLOGY/ONCOLOGY | Facility: CLINIC | Age: 20
End: 2020-09-15
Attending: PEDIATRICS
Payer: COMMERCIAL

## 2020-09-15 ENCOUNTER — OFFICE VISIT (OUTPATIENT)
Dept: INFECTIOUS DISEASES | Facility: CLINIC | Age: 20
End: 2020-09-15
Attending: PEDIATRICS
Payer: COMMERCIAL

## 2020-09-15 VITALS
HEIGHT: 65 IN | RESPIRATION RATE: 16 BRPM | TEMPERATURE: 98 F | HEART RATE: 107 BPM | SYSTOLIC BLOOD PRESSURE: 124 MMHG | WEIGHT: 121.25 LBS | DIASTOLIC BLOOD PRESSURE: 87 MMHG | BODY MASS INDEX: 20.2 KG/M2 | OXYGEN SATURATION: 98 %

## 2020-09-15 DIAGNOSIS — Z53.9 ERRONEOUS ENCOUNTER--DISREGARD: Primary | ICD-10-CM

## 2020-09-15 DIAGNOSIS — B27.00 EBV (EPSTEIN-BARR VIRUS) VIREMIA: Primary | ICD-10-CM

## 2020-09-15 PROCEDURE — G0463 HOSPITAL OUTPT CLINIC VISIT: HCPCS | Mod: ZF

## 2020-09-15 ASSESSMENT — MIFFLIN-ST. JEOR: SCORE: 1320.88

## 2020-09-15 ASSESSMENT — PAIN SCALES - GENERAL: PAINLEVEL: NO PAIN (0)

## 2020-09-15 NOTE — NURSING NOTE
Patient to be seen and roomed in [facility]. Please see Dr. Stevens note for vitals and med rec.    Sondra Mejias RN on 9/15/2020 at 7:21 AM

## 2020-09-15 NOTE — NURSING NOTE
"Chief Complaint   Patient presents with     New Patient     Patient is here today for new consult     /87 (BP Location: Right arm, Patient Position: Fowlers, Cuff Size: Adult Regular)   Pulse 107   Temp 98  F (36.7  C) (Oral)   Resp 16   Ht 1.651 m (5' 5\")   Wt 55 kg (121 lb 4.1 oz)   SpO2 98%   BMI 20.18 kg/m      No Pain (0)  Data Unavailable    I have reviewed the patients medications and allergies    Height/weight double check needed? No    Brittany Reynoso LPN  September 15, 2020  "

## 2020-09-15 NOTE — LETTER
9/15/2020      RE: Laurel Santoyo  [address] [facility] [address]       [facility]  Pediatric Hematology/Oncology   New EBV Viremia/PTLD Clinic Patient    Laurel Santoyo MRN# [id number]   YOB: 2000 Age: 20 year old      Reason for consultation: We are seeing Laurel Santoyo today at the request of Dr. Linton for persistent EBV viremia with evidence of lytic viral infection.          History of Present Illness:   Laurel Santoyo is a 20 year old young woman with a history of complex congenital heart disease s/p orthotopic heart transplant 2/17/04, aortic stenosis s/p multiple corrective procedures/stents and now persistent EBV viremia who presents to our clinic in consultation for evaluation and monitoring for PTLD. Laurel provided the history in addition to extensive review of her EMR.    Laurel was EBV seronegative post-transplant until 8/16/18 when she was first noted to have detectable EBV capsid IgM and remained capsid IgM positive through 12/26/19 when she was first noted to have detectable whole blood EBV PCR level of 138K. She did not seroconvert to EBV capsid IgG positive until 3/25/20. In the weeks prior to her first detectable EBV PCR level, Laurel was diagnosed with pneumonia and otitis media. She was also noted at that time to have tonsillar exudates and moderate cervical lymphadenopathy on exam consistent with a mono-like syndrome. These symptoms have all subsequently resolved.     Her whole blood EBV PCR level has remained over 100K persistently for the last year with a peak of 549K on 7/2/20, but improvement to 254K at her most recent check on 9/2/20. Despite this trend in her whole blood EBV PCR level, she has not had any other symptoms concerning for development of PTLD. She denies unexplained fevers, ill symptoms such as runny nose, sore throat, or cough, abdominal pain, unintentional weight loss, GI upset, nausea,  vomiting, or  unusual lumps or bumps. She does have chronic fatigue that waxes and wanes which has been continuing to follow the usual pattern of worsening when she is more depressed.         Review of Systems:   Pertinent positives reported in the HPI. All other systems in a complete and comprehensive review of systems were negative.          Past Medical History:     Past Medical History:   Diagnosis Date     Heart transplant, orthotopic, status  2012     HLHS (hypoplastic left heart syndrome) 2007     HLHS (hypoplastic left heart syndrome) 2007    Hx of Heart Transplant in 2004 at age 3 years 2008 sub-aortic membrane removal      PONV (postoperative nausea and vomiting)      Post-operative aortic arch obstruction 2011     s/p heart transplant 2011          Past Surgical History:     Past Surgical History:   Procedure Laterality Date     ANGIOGRAPHY  2012    Procedure: ANGIOGRAPHY;;  Surgeon: Melanie Arias MD;  Location: [facility] OR     C TRANSPLANTATION OF HEART  2004     HEART CATH CHILD  2011    Procedure:HEART CATH CHILD; Right Left, Biopsy and Possible Coarct Stent; Surgeon:MELANIE ARIAS; Location:[facility] OR     HEART CATH CHILD  2012    Procedure: HEART CATH CHILD;  Left Heart Cath, Coronary Angiogram with Possible Balloon Dilatation of Aorta;  Surgeon: Melanie Arias MD;  Location: [facility] OR     INSERT PICC LINE  2012    Procedure: INSERT PICC LINE;  Picc Line Placement;  Surgeon: ANNA Bailey MD;  Location: [facility] OR          Social History:     Social History     Social History Narrative    FAMILY INFORMATION     Date: 2007    Parent #1      Name: Shaneka Santoyo   Gender: Female   : 1965      Education: M.A.   Occupation:         Parent #2      Name: Mina Santoyo   Gender: Male   : 1969     Education: M.A.   Occupation:         Siblings:      Vince Natanael                 Gender: Male           : 1998    Denice Santoyo           Gender: Female      : 2002        Relationship Status of Parent(s):     Who does the child live with? Parents, brother, and sister    What language(s) is/are spoken at home? English             -- Laurel reports that she did not return to [employer] this semester given the COVID pandemic, but instead has enrolled in online classes through a local [employer] college.       Family History:     Family History   Problem Relation Age of Onset     Other - See Comments Maternal Grandfather         mental health     Other - See Comments Mother 18        migraine headaches             Immunizations:     Immunization History   Administered Date(s) Administered     Comvax (HIB/HepB) 2000, 2001     DTAP (<7y) 2000, 2000, 2005, 2007     HEPA 2014     HPV 2013, 10/17/2013, 2014     HepA-ped 2 Dose 2018     HepB 2007     Influenza (H1N1) 10/21/2009, 2009     Influenza (IIV3) PF 2000, 10/24/2002, 10/09/2003, 10/06/2004, 2006, 10/19/2007, 10/15/2008, 10/11/2010, 10/03/2011, 10/19/2012     Influenza Vaccine IM 18-49 Yrs, RIV3 10/08/2019     Influenza Vaccine IM > 6 months Valent IIV4 10/17/2013, 10/17/2014, 10/19/2015, 10/21/2016, 2018     Influenza vaccine ages 6-35 months 10/15/2009     MMR 10/11/2001, 2005     Meningococcal (Bexsero ) 2019     Meningococcal (Menactra ) 2013, 2017     Pneumo Conj 13-V (2010&after) 2019     Pneumococcal (PCV 7) 2000, 2000, 2001     Pneumococcal 23 valent 2017     Poliovirus, inactivated (IPV) 2000, 2000, 2005, 2007     Synagis 10/11/2001, 10/09/2003, 2003, 2003, 2003, 2004, 2004, 2004, 2004, 03/10/2005, 2005     TDAP Vaccine (Boostrix) 2012     Varicella 10/11/2001             Allergies:   No  "Known Allergies          Medications:     Current Outpatient Medications   Medication Sig     amoxicillin (AMOXIL) 500 MG capsule TAKE 4 CAPSULES BY MOUTH ONCE FOR 1 DOSE, 30-60 MINUTES PRIOR TO DENTAL CLEANING     aspirin EC 81 MG EC tablet Take 1 tablet by mouth daily.     FLUoxetine (PROZAC) 20 MG capsule Take 40 mg by mouth daily     lisinopril (PRINIVIL/ZESTRIL) 10 MG tablet Take 1 tablet (10 mg) by mouth daily     magnesium oxide (MAG-OX) 400 (241.3 Mg) MG tablet Take 1 tablet (400 mg) by mouth daily     multivitamin, therapeutic with minerals (MULTI-VITAMIN) TABS tablet Take 1 tablet by mouth daily     simvastatin (ZOCOR) 5 MG tablet Take 1 tablet (5 mg) by mouth every evening     tacrolimus (GENERIC EQUIVALENT) 0.5 MG capsule Take 4 capsules (2 mg) by mouth 2 times daily Take 2 mg (4 capsules) twice daily     vitamin D3 (CHOLECALCIFEROL) 2000 units (50 mcg) tablet Take 1 tablet (2,000 Units) by mouth daily     No current facility-administered medications for this visit.             Physical Exam:   /87 (BP Location: Right arm, Patient Position: Fowlers, Cuff Size: Adult Regular)   Pulse 107   Temp 98  F (36.7  C) (Oral)   Resp 16   Ht 1.651 m (5' 5\")   Wt 55 kg (121 lb 4.1 oz)   SpO2 98%   BMI 20.18 kg/m      GENERAL APPEARANCE: healthy, alert and no distress  EYES: Eyes grossly normal to inspection, conjunctivae and sclerae normal, extraocular movements intact  HENT: nose and mouth without ulcers or lesions, oropharynx clear and oral mucous membranes moist  NECK: no adenopathy, no asymmetry, masses  RESP: lungs clear to auscultation - no rales, rhonchi or wheezes  CV: regular rate and rhythm, no peripheral edema and peripheral pulses strong  ABDOMEN: soft, nontender, no hepatosplenomegaly, no masses   MS: no musculoskeletal defects are noted and gait is age appropriate without ataxia  SKIN: no suspicious lesions or rashes  NEURO: Normal strength and tone, sensory exam grossly normal, mentation " intact and speech normal  PSYCH: mentation appears normal and affect normal/bright         Data:   -- Last CBC with diff (7/2/20): 5.4>12.8<275 ANC 3.1    -- Plasma EBV PCR (9/2/20): 2540    -- Whole blood EBV PCR trends since first positive on 12/26/19      CTA chest/abdomen (7/2/20)  FINDINGS:    Chest:  The thyroid and could represent residual thymus, aortic branching  pattern, heart size, pericardium, and esophagus are normal. The  ascending aorta and main pulmonary artery are not dilated. No large  central pulmonary embolism. No lymphadenopathy in the chest. The  central tracheobronchial tree is patent with secretions/debris in the  lower trachea and right mainstem bronchus. No pneumothorax or pleural  effusion. No airspace consolidation. Stable emphysematous changes in  the left lung most pronounced in the left lower lobe.    Postsurgical changes of cardiac transplantation with no significant  change in the areas of narrowing in the aortic arch with aortic  measurements as follows:  Sinuses of Valsalva: 2.9 x 3.0 x 3.0 cm  Sinotubular ridge: 2.6 x 2.6 cm  Ascending aorta: 2.6 x 2.6 cm  Proximal arch: 2.3 x 2.2 cm  Proximal arch at anastomotic site: 1.6 x 1.3 cm, grossly stable  Mid arch: 1.5 x 1.7 cm   Isthmus: 1.7 x 1.5 cm, previously 1.3 x 1.3 cm  Proximal descending aorta: 2.7 x 2.8 cm  Distal descending aorta: 1.9 x 1.9 cm     Abdomen:  Stable position and orientation of the migrated aortic stent, again at  the level of the celiac artery. Aortic origin of an accessory artery  supplying the left gastric and left hepatic distributions. The stent  remains partially compressed and there is no associated thrombus.    The liver, gallbladder, spleen, adrenal glands, pancreas, and kidneys  are unremarkable. No abnormally dilated loops of bowel. No  intra-abdominal free air or free fluid. Unchanged prominent  nonenlarged retroperitoneal lymph nodes.     Bones and soft tissues:   Intact sternotomy wires. No acute or  worrisome osseous lesions.                                                                   IMPRESSION:   1. Postoperative changes of cardiac transplantation with stable caliber change at the aortic anastomosis.  2. Stable migrated aortic stent at the level of the celiac artery without associated thrombus.  3. Stable emphysematous changes in the left lower lobe.     I have personally reviewed the examination and initial interpretation  and I agree with the findings.     LUPE ALAS MD         Assessment and Plan:   Laurel is a 20 year old young woman with a history of complex congenital heart disease s/p orthotopic heart transplant 2/17/04, aortic stenosis s/p multiple corrective procedures/stents and now persistent EBV viremia in her whole blood and plasma who presented to our clinic in consultation for evaluation and monitoring for PTLD.     Laurel was previously EBV negative by serology and PCR until August 2018 when she was first noted to have detectable EBV capsid IgM. She then remained EBV PCR negative and asymptomatic until December 2019 when she was first noted to have detectable whole blood EBV PCR and experienced a mono-like illness. She has since recovered from those symptoms, but continues to have a persistently elevated whole blood EBV PCR levels ranging from 150-550K and more recently noted to have plasma EBV PCR level of 2450.    By history and exam, she does not have any signs or symptoms concerning for PTLD. Additionally, her most recent imaging (CTA chest/abdomen) from 7/2/20 was reviewed and was also reassuring given there were no enlarged lymph nodes or splenomegaly noted in that study.    Given her status as a heart transplant patient on lifelong immunosuppression who recently experienced a primary EBV viral infection, she remains at risk for the development of PTLD. It was reassuring to see her whole blood EBV PCR level decline at the last check on 9/2/20, but she was also found to have an  elevated EBV PCR level in her plasma more suggestive with lytic as opposed to latent viral infection. She requires close follow up to monitor her regularly with labs and serial exams.    -- Discussed the diagnosis of PTLD and it's relationship to EBV. Reassured Laurel that there are no current findings that are consistent with PTLD, however she remains at risk for its development.   -- There is no indication at this time for PTLD directed therapy although she may qualify for anti-viral therapy based on Dr. Restrepo's evaluation and recommendations  -- Transplant ID appointment to be rescheduled as a virtual visit in the near future.   -- Plan to have EBV labs redrawn in ~1 month and in person follow up in 3 months; in the interim, advised Laurel to contact our clinic if she does develop any symptoms or physical signs concerning for PTLD such as unexplained fevers, abdominal pain, new lumps or bumps, unintentional weight loss, GI upset, or worsening fatigue. She was provided with our contact information.     This patient was seen by and staffed with Dr. Jd Stevens.    Shannon Sales MD MPH  Pediatric Hematology Oncology Fellow  Pager: [phone]    Physician Attestation    I saw and evaluated the patient. I discussed with the fellow and agree with the findings and plan as documented in the fellow's note.     Jd Stevens MD  Pediatric Hematology/Oncology  [facility]       Jd Stevens MD

## 2020-09-15 NOTE — PROGRESS NOTES
[facility]  Pediatric Hematology/Oncology   New EBV Viremia/PTLD Clinic Patient    Laurel Santoyo MRN# [id number]   YOB: 2000 Age: 20 year old      Reason for consultation: We are seeing Laurel Santoyo today at the request of Dr. Linton for persistent EBV viremia with evidence of lytic viral infection.          History of Present Illness:   Laurel Santoyo is a 20 year old young woman with a history of complex congenital heart disease s/p orthotopic heart transplant 2/17/04, aortic stenosis s/p multiple corrective procedures/stents and now persistent EBV viremia who presents to our clinic in consultation for evaluation and monitoring for PTLD. Laurel provided the history in addition to extensive review of her EMR.    Laurel was EBV seronegative post-transplant until 8/16/18 when she was first noted to have detectable EBV capsid IgM and remained capsid IgM positive through 12/26/19 when she was first noted to have detectable whole blood EBV PCR level of 138K. She did not seroconvert to EBV capsid IgG positive until 3/25/20. In the weeks prior to her first detectable EBV PCR level, Laurel was diagnosed with pneumonia and otitis media. She was also noted at that time to have tonsillar exudates and moderate cervical lymphadenopathy on exam consistent with a mono-like syndrome. These symptoms have all subsequently resolved.     Her whole blood EBV PCR level has remained over 100K persistently for the last year with a peak of 549K on 7/2/20, but improvement to 254K at her most recent check on 9/2/20. Despite this trend in her whole blood EBV PCR level, she has not had any other symptoms concerning for development of PTLD. She denies unexplained fevers, ill symptoms such as runny nose, sore throat, or cough, abdominal pain, unintentional weight loss, GI upset, nausea, vomiting, or  unusual lumps or bumps. She does have chronic fatigue that waxes and wanes  which has been continuing to follow the usual pattern of worsening when she is more depressed.         Review of Systems:   Pertinent positives reported in the HPI. All other systems in a complete and comprehensive review of systems were negative.          Past Medical History:     Past Medical History:   Diagnosis Date     Heart transplant, orthotopic, status  2012     HLHS (hypoplastic left heart syndrome) 2007     HLHS (hypoplastic left heart syndrome) 2007    Hx of Heart Transplant in 2004 at age 3 years 2008 sub-aortic membrane removal      PONV (postoperative nausea and vomiting)      Post-operative aortic arch obstruction 2011     s/p heart transplant 2011          Past Surgical History:     Past Surgical History:   Procedure Laterality Date     ANGIOGRAPHY  2012    Procedure: ANGIOGRAPHY;;  Surgeon: Melanie Arias MD;  Location: [facility] OR     C TRANSPLANTATION OF HEART  2004     HEART CATH CHILD  2011    Procedure:HEART CATH CHILD; Right Left, Biopsy and Possible Coarct Stent; Surgeon:MELANIE ARIAS; Location:[facility] OR     HEART CATH CHILD  2012    Procedure: HEART CATH CHILD;  Left Heart Cath, Coronary Angiogram with Possible Balloon Dilatation of Aorta;  Surgeon: Melanie Arias MD;  Location: [facility] OR     INSERT PICC LINE  2012    Procedure: INSERT PICC LINE;  Picc Line Placement;  Surgeon: ANNA Bailey MD;  Location: [facility] OR          Social History:     Social History     Social History Narrative    FAMILY INFORMATION     Date: 2007    Parent #1      Name: Shaneka Santoyo   Gender: Female   : 1965      Education: M.A.   Occupation:         Parent #2      Name: Mina Santoyo   Gender: Male   : 1969     Education: M.A.   Occupation:         Siblings:      Vince Natanael                Gender: Male           : 1998    Denice Santoyo           Gender:  Female      : 2002        Relationship Status of Parent(s):     Who does the child live with? Parents, brother, and sister    What language(s) is/are spoken at home? English             -- Laurel reports that she did not return to [employer] this semester given the COVID pandemic, but instead has enrolled in online classes through a local [employer].       Family History:     Family History   Problem Relation Age of Onset     Other - See Comments Maternal Grandfather         mental health     Other - See Comments Mother 18        migraine headaches             Immunizations:     Immunization History   Administered Date(s) Administered     Comvax (HIB/HepB) 2000, 2001     DTAP (<7y) 2000, 2000, 2005, 2007     HEPA 2014     HPV 2013, 10/17/2013, 2014     HepA-ped 2 Dose 2018     HepB 2007     Influenza (H1N1) 10/21/2009, 2009     Influenza (IIV3) PF 2000, 10/24/2002, 10/09/2003, 10/06/2004, 2006, 10/19/2007, 10/15/2008, 10/11/2010, 10/03/2011, 10/19/2012     Influenza Vaccine IM 18-49 Yrs, RIV3 10/08/2019     Influenza Vaccine IM > 6 months Valent IIV4 10/17/2013, 10/17/2014, 10/19/2015, 10/21/2016, 2018     Influenza vaccine ages 6-35 months 10/15/2009     MMR 10/11/2001, 2005     Meningococcal (Bexsero ) 2019     Meningococcal (Menactra ) 2013, 2017     Pneumo Conj 13-V (2010&after) 2019     Pneumococcal (PCV 7) 2000, 2000, 2001     Pneumococcal 23 valent 2017     Poliovirus, inactivated (IPV) 2000, 2000, 2005, 2007     Synagis 10/11/2001, 10/09/2003, 2003, 2003, 2003, 2004, 2004, 2004, 2004, 03/10/2005, 2005     TDAP Vaccine (Boostrix) 2012     Varicella 10/11/2001             Allergies:   No Known Allergies          Medications:     Current Outpatient Medications   Medication  "Sig     amoxicillin (AMOXIL) 500 MG capsule TAKE 4 CAPSULES BY MOUTH ONCE FOR 1 DOSE, 30-60 MINUTES PRIOR TO DENTAL CLEANING     aspirin EC 81 MG EC tablet Take 1 tablet by mouth daily.     FLUoxetine (PROZAC) 20 MG capsule Take 40 mg by mouth daily     lisinopril (PRINIVIL/ZESTRIL) 10 MG tablet Take 1 tablet (10 mg) by mouth daily     magnesium oxide (MAG-OX) 400 (241.3 Mg) MG tablet Take 1 tablet (400 mg) by mouth daily     multivitamin, therapeutic with minerals (MULTI-VITAMIN) TABS tablet Take 1 tablet by mouth daily     simvastatin (ZOCOR) 5 MG tablet Take 1 tablet (5 mg) by mouth every evening     tacrolimus (GENERIC EQUIVALENT) 0.5 MG capsule Take 4 capsules (2 mg) by mouth 2 times daily Take 2 mg (4 capsules) twice daily     vitamin D3 (CHOLECALCIFEROL) 2000 units (50 mcg) tablet Take 1 tablet (2,000 Units) by mouth daily     No current facility-administered medications for this visit.             Physical Exam:   /87 (BP Location: Right arm, Patient Position: Fowlers, Cuff Size: Adult Regular)   Pulse 107   Temp 98  F (36.7  C) (Oral)   Resp 16   Ht 1.651 m (5' 5\")   Wt 55 kg (121 lb 4.1 oz)   SpO2 98%   BMI 20.18 kg/m      GENERAL APPEARANCE: healthy, alert and no distress  EYES: Eyes grossly normal to inspection, conjunctivae and sclerae normal, extraocular movements intact  HENT: nose and mouth without ulcers or lesions, oropharynx clear and oral mucous membranes moist  NECK: no adenopathy, no asymmetry, masses  RESP: lungs clear to auscultation - no rales, rhonchi or wheezes  CV: regular rate and rhythm, no peripheral edema and peripheral pulses strong  ABDOMEN: soft, nontender, no hepatosplenomegaly, no masses   MS: no musculoskeletal defects are noted and gait is age appropriate without ataxia  SKIN: no suspicious lesions or rashes  NEURO: Normal strength and tone, sensory exam grossly normal, mentation intact and speech normal  PSYCH: mentation appears normal and affect normal/bright     "     Data:   -- Last CBC with diff (7/2/20): 5.4>12.8<275 ANC 3.1    -- Plasma EBV PCR (9/2/20): 2540    -- Whole blood EBV PCR trends since first positive on 12/26/19      CTA chest/abdomen (7/2/20)  FINDINGS:    Chest:  The thyroid and could represent residual thymus, aortic branching  pattern, heart size, pericardium, and esophagus are normal. The  ascending aorta and main pulmonary artery are not dilated. No large  central pulmonary embolism. No lymphadenopathy in the chest. The  central tracheobronchial tree is patent with secretions/debris in the  lower trachea and right mainstem bronchus. No pneumothorax or pleural  effusion. No airspace consolidation. Stable emphysematous changes in  the left lung most pronounced in the left lower lobe.    Postsurgical changes of cardiac transplantation with no significant  change in the areas of narrowing in the aortic arch with aortic  measurements as follows:  Sinuses of Valsalva: 2.9 x 3.0 x 3.0 cm  Sinotubular ridge: 2.6 x 2.6 cm  Ascending aorta: 2.6 x 2.6 cm  Proximal arch: 2.3 x 2.2 cm  Proximal arch at anastomotic site: 1.6 x 1.3 cm, grossly stable  Mid arch: 1.5 x 1.7 cm   Isthmus: 1.7 x 1.5 cm, previously 1.3 x 1.3 cm  Proximal descending aorta: 2.7 x 2.8 cm  Distal descending aorta: 1.9 x 1.9 cm     Abdomen:  Stable position and orientation of the migrated aortic stent, again at  the level of the celiac artery. Aortic origin of an accessory artery  supplying the left gastric and left hepatic distributions. The stent  remains partially compressed and there is no associated thrombus.    The liver, gallbladder, spleen, adrenal glands, pancreas, and kidneys  are unremarkable. No abnormally dilated loops of bowel. No  intra-abdominal free air or free fluid. Unchanged prominent  nonenlarged retroperitoneal lymph nodes.     Bones and soft tissues:   Intact sternotomy wires. No acute or worrisome osseous lesions.                                                                    IMPRESSION:   1. Postoperative changes of cardiac transplantation with stable caliber change at the aortic anastomosis.  2. Stable migrated aortic stent at the level of the celiac artery without associated thrombus.  3. Stable emphysematous changes in the left lower lobe.     I have personally reviewed the examination and initial interpretation  and I agree with the findings.     LUPE ALAS MD         Assessment and Plan:   Laurel is a 20 year old young woman with a history of complex congenital heart disease s/p orthotopic heart transplant 2/17/04, aortic stenosis s/p multiple corrective procedures/stents and now persistent EBV viremia in her whole blood and plasma who presented to our clinic in consultation for evaluation and monitoring for PTLD.     Laurel was previously EBV negative by serology and PCR until August 2018 when she was first noted to have detectable EBV capsid IgM. She then remained EBV PCR negative and asymptomatic until December 2019 when she was first noted to have detectable whole blood EBV PCR and experienced a mono-like illness. She has since recovered from those symptoms, but continues to have a persistently elevated whole blood EBV PCR levels ranging from 150-550K and more recently noted to have plasma EBV PCR level of 2450.    By history and exam, she does not have any signs or symptoms concerning for PTLD. Additionally, her most recent imaging (CTA chest/abdomen) from 7/2/20 was reviewed and was also reassuring given there were no enlarged lymph nodes or splenomegaly noted in that study.    Given her status as a heart transplant patient on lifelong immunosuppression who recently experienced a primary EBV viral infection, she remains at risk for the development of PTLD. It was reassuring to see her whole blood EBV PCR level decline at the last check on 9/2/20, but she was also found to have an elevated EBV PCR level in her plasma more suggestive with lytic as opposed to latent  viral infection. She requires close follow up to monitor her regularly with labs and serial exams.    -- Discussed the diagnosis of PTLD and it's relationship to EBV. Reassured Laurel that there are no current findings that are consistent with PTLD, however she remains at risk for its development.   -- There is no indication at this time for PTLD directed therapy although she may qualify for anti-viral therapy based on Dr. Restrepo's evaluation and recommendations  -- Transplant ID appointment to be rescheduled as a virtual visit in the near future.   -- Plan to have EBV labs redrawn in ~1 month and in person follow up in 3 months; in the interim, advised Laurel to contact our clinic if she does develop any symptoms or physical signs concerning for PTLD such as unexplained fevers, abdominal pain, new lumps or bumps, unintentional weight loss, GI upset, or worsening fatigue. She was provided with our contact information.     This patient was seen by and staffed with Dr. Jd Stevens.    Shannon Sales MD MPH  Pediatric Hematology Oncology Fellow  Pager: [phone]    Physician Attestation    I saw and evaluated the patient. I discussed with the fellow and agree with the findings and plan as documented in the fellow's note.     Jd Stevens MD  Pediatric Hematology/Oncology  [facility]

## 2020-09-15 NOTE — LETTER
9/15/2020      RE: Laurel Santoyo  [address] [id number]         This encounter was opened in error. Please disregard.    Mark Cote MD

## 2020-10-14 ENCOUNTER — TELEPHONE (OUTPATIENT)
Dept: INFECTIOUS DISEASES | Facility: CLINIC | Age: 20
End: 2020-10-14

## 2020-10-14 NOTE — TELEPHONE ENCOUNTER
Have left 3+ messages and a mychart for Laurel to schedule with Dr. Restrepo for EBV.  Sondra Mejias RN on 10/14/2020 at 4:33 PM

## 2020-11-16 ENCOUNTER — HEALTH MAINTENANCE LETTER (OUTPATIENT)
Age: 20
End: 2020-11-16

## 2020-11-18 DIAGNOSIS — Z94.1 HEART REPLACED BY TRANSPLANT (H): ICD-10-CM

## 2020-11-18 LAB
ALBUMIN SERPL-MCNC: 3.7 G/DL (ref 3.4–5)
ALP SERPL-CCNC: 53 U/L (ref 40–150)
ALT SERPL W P-5'-P-CCNC: 21 U/L (ref 0–50)
ANION GAP SERPL CALCULATED.3IONS-SCNC: 3 MMOL/L (ref 3–14)
AST SERPL W P-5'-P-CCNC: 14 U/L (ref 0–45)
BASOPHILS # BLD AUTO: 0.1 10E9/L (ref 0–0.2)
BASOPHILS NFR BLD AUTO: 0.7 %
BILIRUB SERPL-MCNC: 0.3 MG/DL (ref 0.2–1.3)
BUN SERPL-MCNC: 14 MG/DL (ref 7–30)
CALCIUM SERPL-MCNC: 9 MG/DL (ref 8.5–10.1)
CHLORIDE SERPL-SCNC: 107 MMOL/L (ref 94–109)
CK SERPL-CCNC: 52 U/L (ref 30–225)
CMV IGG SERPL QL IA: <0.2 AI (ref 0–0.8)
CMV IGM SERPL QL IA: <0.2 AI (ref 0–0.8)
CO2 SERPL-SCNC: 29 MMOL/L (ref 20–32)
CREAT SERPL-MCNC: 0.75 MG/DL (ref 0.52–1.04)
DIFFERENTIAL METHOD BLD: NORMAL
EBV NA IGG SER QL IA: 1.2 AI (ref 0–0.8)
EBV VCA IGG SER QL IA: >8 AI (ref 0–0.8)
EBV VCA IGM SER QL IA: 0.2 AI (ref 0–0.8)
EOSINOPHIL # BLD AUTO: 0.7 10E9/L (ref 0–0.7)
EOSINOPHIL NFR BLD AUTO: 9.2 %
ERYTHROCYTE [DISTWIDTH] IN BLOOD BY AUTOMATED COUNT: 13.2 % (ref 10–15)
GFR SERPL CREATININE-BSD FRML MDRD: >90 ML/MIN/{1.73_M2}
GLUCOSE SERPL-MCNC: 84 MG/DL (ref 70–99)
HCT VFR BLD AUTO: 39.7 % (ref 35–47)
HGB BLD-MCNC: 12.8 G/DL (ref 11.7–15.7)
IMM GRANULOCYTES # BLD: 0 10E9/L (ref 0–0.4)
IMM GRANULOCYTES NFR BLD: 0.3 %
LYMPHOCYTES # BLD AUTO: 1.1 10E9/L (ref 0.8–5.3)
LYMPHOCYTES NFR BLD AUTO: 14.4 %
MAGNESIUM SERPL-MCNC: 1.7 MG/DL (ref 1.6–2.3)
MCH RBC QN AUTO: 28.3 PG (ref 26.5–33)
MCHC RBC AUTO-ENTMCNC: 32.2 G/DL (ref 31.5–36.5)
MCV RBC AUTO: 88 FL (ref 78–100)
MONOCYTES # BLD AUTO: 0.8 10E9/L (ref 0–1.3)
MONOCYTES NFR BLD AUTO: 11.2 %
NEUTROPHILS # BLD AUTO: 4.7 10E9/L (ref 1.6–8.3)
NEUTROPHILS NFR BLD AUTO: 64.2 %
NRBC # BLD AUTO: 0 10*3/UL
NRBC BLD AUTO-RTO: 0 /100
NT-PROBNP SERPL-MCNC: 90 PG/ML (ref 0–125)
PHOSPHATE SERPL-MCNC: 4.4 MG/DL (ref 2.5–4.5)
PLATELET # BLD AUTO: 227 10E9/L (ref 150–450)
POTASSIUM SERPL-SCNC: 4 MMOL/L (ref 3.4–5.3)
PROT SERPL-MCNC: 7.9 G/DL (ref 6.8–8.8)
RBC # BLD AUTO: 4.53 10E12/L (ref 3.8–5.2)
SODIUM SERPL-SCNC: 139 MMOL/L (ref 133–144)
TACROLIMUS BLD-MCNC: 5 UG/L (ref 5–15)
TME LAST DOSE: NORMAL H
TROPONIN I SERPL-MCNC: <0.015 UG/L (ref 0–0.04)
WBC # BLD AUTO: 7.3 10E9/L (ref 4–11)

## 2020-11-18 PROCEDURE — 86665 EPSTEIN-BARR CAPSID VCA: CPT | Performed by: PEDIATRICS

## 2020-11-18 PROCEDURE — 86645 CMV ANTIBODY IGM: CPT | Performed by: PEDIATRICS

## 2020-11-18 PROCEDURE — 80197 ASSAY OF TACROLIMUS: CPT | Performed by: PEDIATRICS

## 2020-11-18 PROCEDURE — 84100 ASSAY OF PHOSPHORUS: CPT | Performed by: PEDIATRICS

## 2020-11-18 PROCEDURE — 82550 ASSAY OF CK (CPK): CPT | Performed by: PEDIATRICS

## 2020-11-18 PROCEDURE — 86644 CMV ANTIBODY: CPT | Performed by: PEDIATRICS

## 2020-11-18 PROCEDURE — 87799 DETECT AGENT NOS DNA QUANT: CPT | Performed by: PEDIATRICS

## 2020-11-18 PROCEDURE — 83735 ASSAY OF MAGNESIUM: CPT | Performed by: PEDIATRICS

## 2020-11-18 PROCEDURE — 80053 COMPREHEN METABOLIC PANEL: CPT | Performed by: PEDIATRICS

## 2020-11-18 PROCEDURE — 85025 COMPLETE CBC W/AUTO DIFF WBC: CPT | Performed by: PEDIATRICS

## 2020-11-18 PROCEDURE — 83880 ASSAY OF NATRIURETIC PEPTIDE: CPT | Performed by: PEDIATRICS

## 2020-11-18 PROCEDURE — 86664 EPSTEIN-BARR NUCLEAR ANTIGEN: CPT | Performed by: PEDIATRICS

## 2020-11-18 PROCEDURE — 36415 COLL VENOUS BLD VENIPUNCTURE: CPT | Performed by: PEDIATRICS

## 2020-11-18 PROCEDURE — 84484 ASSAY OF TROPONIN QUANT: CPT | Performed by: PEDIATRICS

## 2020-11-19 ENCOUNTER — TELEPHONE (OUTPATIENT)
Dept: PEDIATRIC CARDIOLOGY | Facility: CLINIC | Age: 20
End: 2020-11-19

## 2020-11-19 LAB
CMV DNA SPEC NAA+PROBE-ACNC: NORMAL [IU]/ML
CMV DNA SPEC NAA+PROBE-LOG#: NORMAL {LOG_IU}/ML
EBV DNA # SPEC NAA+PROBE: ABNORMAL {COPIES}/ML
EBV DNA SPEC NAA+PROBE-LOG#: 5 {LOG_COPIES}/ML
SPECIMEN SOURCE: NORMAL

## 2020-11-25 NOTE — TELEPHONE ENCOUNTER
I called Laurel back with her lab results. Her kidney, liver and heart function is normal. Her tacrolimus level is within goal range of 5-8 at 5. This was a 12 hour trough per her medication times of 9A and 9P. It is fine that she is at the low end of this range given her ongoing EBV viremia. I instructed her to continue on with her current tacrolimus dose. Her hemoglobin is stable. Her CMV continues to be negative and EBV low and DNA are both lower.     We will plan to see her for her semiannual visit on January 4 (per Laurel's preference). She will have follow up with heme/onc and ID for her EBV viremia in December and can have repeat EBV labs done at that time since she should have them done monthly. Laurel is in agreement with this plan.

## 2020-12-22 ENCOUNTER — VIRTUAL VISIT (OUTPATIENT)
Dept: PEDIATRIC HEMATOLOGY/ONCOLOGY | Facility: CLINIC | Age: 20
End: 2020-12-22
Attending: PEDIATRICS
Payer: COMMERCIAL

## 2020-12-22 DIAGNOSIS — Z94.1 HEART REPLACED BY TRANSPLANT (H): ICD-10-CM

## 2020-12-22 DIAGNOSIS — B27.00 EBV (EPSTEIN-BARR VIRUS) VIREMIA: Primary | ICD-10-CM

## 2020-12-22 PROCEDURE — 99213 OFFICE O/P EST LOW 20 MIN: CPT | Mod: 95 | Performed by: PEDIATRICS

## 2020-12-22 NOTE — LETTER
12/22/2020      RE: Laurel Santoyo  [address] [facility] [place] [address]       [facility]  Pediatric Hematology/Oncology   EBV Viremia/PTLD Clinic Note    Video-Visit Details    Type of service:  Video Visit    Video Start Time (time video started): 1128    Video End Time (time video stopped): 1159    Originating Location (pt. Location): Home    Distant Location (provider location):  [facility]     Mode of Communication:  Video Conference via [facility]    Physician has received verbal consent for a Video Visit from the patient? Yes    Interval History:  Laurel Santoyo is a 20 year old young woman with a history of complex congenital heart disease s/p orthotopic heart transplant 2/17/04, aortic stenosis s/p multiple corrective procedures/stents and now persistent EBV viremia who I am seeing virtually in follow-up for evaluation and monitoring for PTLD. Laurel provided the history.    Laurel has been well since her last visit. She recently finished online classes for the semester and she is working at [employer]. She has not had any illnesses. She recently stopped taking her selective serotonin reuptake inhibitor and reports feeling better off of it, but she does continue to endorse some fatigue from time to time that she relates to her underlying depression.     Today, she continues to deny unexplained fevers, ill symptoms such as runny nose, sore throat, or cough, abdominal pain, unintentional weight loss, GI upset, nausea, vomiting, or  unusual lumps or bumps. She does have chronic fatigue that waxes and wanes which has been continuing to follow the usual pattern of worsening when she is more depressed.    EBV/PTLD History:  Laurel was EBV seronegative post-transplant until 8/16/18 when she was first noted to have detectable EBV capsid IgM and remained capsid IgM positive through 12/26/19 when she was first noted  to have detectable whole blood EBV PCR level of 138K. She did not seroconvert to EBV capsid IgG positive until 3/25/20. In the weeks prior to her first detectable EBV PCR level, Laurel was diagnosed with pneumonia and otitis media. She was also noted at that time to have tonsillar exudates and moderate cervical lymphadenopathy on exam consistent with a mono-like syndrome. These symptoms have all subsequently resolved. Of note, as of 11/18/20, she has now also seroconverted to EBNA IgG positive.     Her whole blood EBV PCR level has remained over 100K persistently for the last year with a peak of 549K on 7/2/20, but improvement to 254K on 9/2/20 and then 100K on 11/18/20. Additionally, her plasma EBV PCR was positive at 2800 copies on 9/2/20 suggesting some degree of lytic disease. Despite her persistent whole blood EBV PCR level and evidence of lytic virus, she has not had any other symptoms concerning for development of PTLD. She denies unexplained fevers, ill symptoms such as runny nose, sore throat, or cough, abdominal pain, unintentional weight loss, GI upset, nausea, vomiting, or  unusual lumps or bumps. She does have chronic fatigue that waxes and wanes which has been continuing to follow the usual pattern of worsening when she is more depressed.    Review of Systems:  Pertinent positives reported in the HPI. All other systems in a complete and comprehensive review of systems were negative.    Past Medical History:  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     s/p heart transplant 2/2004 7/12/2011     Past Surgical History:  Past Surgical History:   Procedure  Laterality Date     ANGIOGRAPHY  2012    Procedure: ANGIOGRAPHY;;  Surgeon: Nick Hutchinson MD;  [facility]     C TRANSPLANTATION OF HEART  2004     HEART CATH CHILD  2011    Procedure:HEART CATH CHILD; Right Left, Biopsy and Possible Coarct Stent; Surgeon:NICK HUTCHINSON; [facility] OR     HEART CATH CHILD  2012    Procedure: HEART CATH CHILD;  Left Heart Cath, Coronary Angiogram with Possible Balloon Dilatation of Aorta;  Surgeon: Nick Hutchinson MD;  [facility]     INSERT PICC LINE  2012    Procedure: INSERT PICC LINE;  Picc Line Placement;  Surgeon: ANNA Bailey MD;  [facility]     Family History:  Family History   Problem Relation Age of Onset     Other - See Comments Maternal Grandfather         mental health     Other - See Comments Mother 18        migraine headaches     Social History:  Social History     Social History Narrative    FAMILY INFORMATION     Date: 2007    Parent #1      Name: Shaneka Corrigan Natanael   Gender: Female   : 1965      Education: M.A.   Occupation:         Parent #2      Name: Mina Santoyo   Gender: Male   : 1969     Education: M.A.   Occupation:         Siblings:      Vince Natanael                Gender: Male           : 1998    Denice Natanael           Gender: Female      : 2002        Relationship Status of Parent(s):     Who does the child live with? Parents, brother, and sister    What language(s) is/are spoken at home? English            -- Laurel reports that she did not return to [employer] this semester given the COVID pandemic, but instead has enrolled in online classes through a local [employer].    Allergies:   No Known Allergies    Medications:  Current Outpatient Medications   Medication     amoxicillin (AMOXIL) 500 MG capsule     aspirin EC 81 MG EC tablet     FLUoxetine (PROZAC) 20 MG capsule     lisinopril  (PRINIVIL/ZESTRIL) 10 MG tablet     magnesium oxide (MAG-OX) 400 (241.3 Mg) MG tablet     multivitamin, therapeutic with minerals (MULTI-VITAMIN) TABS tablet     simvastatin (ZOCOR) 5 MG tablet     tacrolimus (GENERIC EQUIVALENT) 0.5 MG capsule     vitamin D3 (CHOLECALCIFEROL) 2000 units (50 mcg) tablet     No current facility-administered medications for this visit.      Physical Exam:  There were no vitals taken for this visit. due to this being a virtual visit    GENERAL APPEARANCE: healthy, alert and in no distress  EYES: Eyes grossly normal to inspection, conjunctivae and sclerae normal  NECK: no masses reported by patient upon palpation  RESP: breathing effortlessly  MS: no musculoskeletal defects are noted   NEURO: Normal tone, mentation intact and speech normal  PSYCH: mentation appears normal and affect normal/bright    Data:  -- Last CBC with diff (11/18/20): 7.3>12.8<227 ANC 4.7    -- Plasma EBV PCR (9/2/20): 2540    -- Whole blood EBV PCR trends since first positive on 12/26/19      CTA chest/abdomen (7/2/20)  FINDINGS:    Chest:  The thyroid and could represent residual thymus, aortic branching pattern, heart size, pericardium, and esophagus are normal. The ascending aorta and main pulmonary artery are not dilated. No large central pulmonary embolism. No lymphadenopathy in the chest. The central tracheobronchial tree is patent with secretions/debris in the lower trachea and right mainstem bronchus. No pneumothorax or pleural effusion. No airspace consolidation. Stable emphysematous changes in the left lung most pronounced in the left lower lobe.    Postsurgical changes of cardiac transplantation with no significant change in the areas of narrowing in the aortic arch with aortic measurements as follows:  Sinuses of Valsalva: 2.9 x 3.0 x 3.0 cm  Sinotubular ridge: 2.6 x 2.6 cm  Ascending aorta: 2.6 x 2.6 cm  Proximal arch: 2.3 x 2.2 cm  Proximal arch at anastomotic site: 1.6 x 1.3 cm, grossly stable  Mid  arch: 1.5 x 1.7 cm   Isthmus: 1.7 x 1.5 cm, previously 1.3 x 1.3 cm  Proximal descending aorta: 2.7 x 2.8 cm  Distal descending aorta: 1.9 x 1.9 cm     Abdomen:  Stable position and orientation of the migrated aortic stent, again at the level of the celiac artery. Aortic origin of an accessory artery supplying the left gastric and left hepatic distributions. The stent remains partially compressed and there is no associated thrombus.    The liver, gallbladder, spleen, adrenal glands, pancreas, and kidneys are unremarkable. No abnormally dilated loops of bowel. No intra-abdominal free air or free fluid. Unchanged prominent nonenlarged retroperitoneal lymph nodes.     Bones and soft tissues:   Intact sternotomy wires. No acute or worrisome osseous lesions.                                                                   IMPRESSION:   1. Postoperative changes of cardiac transplantation with stable caliber change at the aortic anastomosis.  2. Stable migrated aortic stent at the level of the celiac artery without associated thrombus.  3. Stable emphysematous changes in the left lower lobe.     I have personally reviewed the examination and initial interpretation  and I agree with the findings.     LUPE ALAS MD    Assessment and Plan:  Laurel is a 20 year old young woman with a history of complex congenital heart disease s/p orthotopic heart transplant 2/17/04, aortic stenosis s/p multiple corrective procedures/stents and now persistent EBV viremia in her whole blood and plasma who is being seen in follow-up for evaluation and monitoring for PTLD.     Laurel was previously EBV negative by serology and PCR until August 2018 when she was first noted to have detectable EBV capsid IgM. She then remained EBV PCR negative and asymptomatic until December 2019 when she was first noted to have detectable whole blood EBV PCR and experienced a mono-like illness. She has since recovered from those symptoms, but continues to have  a persistently elevated whole blood EBV PCR levels ranging from 150-550K and more recently noted to have plasma EBV PCR level of 2450.    By history, she does not have any signs or symptoms concerning for PTLD. Additionally, her most recent imaging (CTA chest/abdomen) from 7/2/20 was reviewed and was also reassuring given there were no enlarged lymph nodes or splenomegaly noted in that study.    Given her status as a heart transplant patient on lifelong immunosuppression who recently experienced a primary EBV viral infection, she remains at risk for the development of PTLD. It was reassuring to see her whole blood EBV PCR level decline at the last 2 checks, but she was also found to have an elevated EBV PCR level in her plasma more suggestive with lytic as opposed to latent viral infection. She requires close follow up to monitor her regularly with labs and serial exams.    -- There is no indication at this time for PTLD directed therapy although she may qualify for anti-viral therapy based on Dr. Restrepo's ongoing recommendations  -- Plan to continue to follow EBV labs closely including a repeat plasma EBV level at her upcoming appointment with Cardiology and see her back in 3 months; in the interim, advised Laurel to contact our clinic if she does develop any symptoms or physical signs concerning for PTLD such as unexplained fevers, abdominal pain, new lumps or bumps, unintentional weight loss, GI upset, or worsening fatigue. She was provided with our contact information.    Jd Stevens MD  Pediatric Hematology/Oncology  [facility]  Pager [phone]

## 2020-12-23 NOTE — PROGRESS NOTES
[facility]  Pediatric Hematology/Oncology   EBV Viremia/PTLD Clinic Note    Video-Visit Details    Type of service:  Video Visit    Video Start Time (time video started): 1128    Video End Time (time video stopped): 1159    Originating Location (pt. Location): Home    Distant Location (provider location):  [facility]     Mode of Communication:  Video Conference via [employer]    Physician has received verbal consent for a Video Visit from the patient? Yes    Interval History:  Laurel Santoyo is a 20 year old young woman with a history of complex congenital heart disease s/p orthotopic heart transplant 2/17/04, aortic stenosis s/p multiple corrective procedures/stents and now persistent EBV viremia who I am seeing virtually in follow-up for evaluation and monitoring for PTLD. Laurel provided the history.    Laurel has been well since her last visit. She recently finished online classes for the semester and she is working at [employer]. She has not had any illnesses. She recently stopped taking her selective serotonin reuptake inhibitor and reports feeling better off of it, but she does continue to endorse some fatigue from time to time that she relates to her underlying depression.     Today, she continues to deny unexplained fevers, ill symptoms such as runny nose, sore throat, or cough, abdominal pain, unintentional weight loss, GI upset, nausea, vomiting, or  unusual lumps or bumps. She does have chronic fatigue that waxes and wanes which has been continuing to follow the usual pattern of worsening when she is more depressed.    EBV/PTLD History:  Laurel was EBV seronegative post-transplant until 8/16/18 when she was first noted to have detectable EBV capsid IgM and remained capsid IgM positive through 12/26/19 when she was first noted to have detectable whole blood EBV PCR level of 138K. She did not seroconvert to EBV  capsid IgG positive until 3/25/20. In the weeks prior to her first detectable EBV PCR level, Laurel was diagnosed with pneumonia and otitis media. She was also noted at that time to have tonsillar exudates and moderate cervical lymphadenopathy on exam consistent with a mono-like syndrome. These symptoms have all subsequently resolved. Of note, as of 11/18/20, she has now also seroconverted to EBNA IgG positive.     Her whole blood EBV PCR level has remained over 100K persistently for the last year with a peak of 549K on 7/2/20, but improvement to 254K on 9/2/20 and then 100K on 11/18/20. Additionally, her plasma EBV PCR was positive at 2800 copies on 9/2/20 suggesting some degree of lytic disease. Despite her persistent whole blood EBV PCR level and evidence of lytic virus, she has not had any other symptoms concerning for development of PTLD. She denies unexplained fevers, ill symptoms such as runny nose, sore throat, or cough, abdominal pain, unintentional weight loss, GI upset, nausea, vomiting, or  unusual lumps or bumps. She does have chronic fatigue that waxes and wanes which has been continuing to follow the usual pattern of worsening when she is more depressed.    Review of Systems:  Pertinent positives reported in the HPI. All other systems in a complete and comprehensive review of systems were negative.    Past Medical History:  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     s/p heart transplant 2/2004 7/12/2011     Past Surgical History:  Past Surgical History:   Procedure Laterality Date     ANGIOGRAPHY  8/14/2012    Procedure: ANGIOGRAPHY;;  Surgeon:  Melanie Hutchinson MD;  [facility]     C TRANSPLANTATION OF HEART  2004     HEART CATH CHILD  2011    Procedure:HEART CATH CHILD; Right Left, Biopsy and Possible Coarct Stent; Surgeon:MELANIE HUTCHINSON; [facility] OR     HEART CATH CHILD  2012    Procedure: HEART CATH CHILD;  Left Heart Cath, Coronary Angiogram with Possible Balloon Dilatation of Aorta;  Surgeon: Melanie Hutchinson MD;  [facility]     INSERT PICC LINE  2012    Procedure: INSERT PICC LINE;  Picc Line Placement;  Surgeon: ANNA Bailey MD;  [facility]     Family History:  Family History   Problem Relation Age of Onset     Other - See Comments Maternal Grandfather         mental health     Other - See Comments Mother 18        migraine headaches     Social History:  Social History     Social History Narrative    FAMILY INFORMATION     Date: 2007    Parent #1      Name: Shaneka Santoyo   Gender: Female   : 1965      Education: M.A.   Occupation:         Parent #2      Name: Mina FRANCISCOEricka Natanael   Gender: Male   : 1969     Education: M.A.   Occupation:         Siblings:      Vince Santoyo                Gender: Male           : 1998    Denice Natanael           Gender: Female      : 2002        Relationship Status of Parent(s):     Who does the child live with? Parents, brother, and sister    What language(s) is/are spoken at home? English            -- Laurel reports that she did not return to [employer] this semester given the COVID pandemic, but instead has enrolled in online classes through a local community college.    Allergies:   No Known Allergies    Medications:  Current Outpatient Medications   Medication     amoxicillin (AMOXIL) 500 MG capsule     aspirin EC 81 MG EC tablet     FLUoxetine (PROZAC) 20 MG capsule     lisinopril (PRINIVIL/ZESTRIL) 10 MG tablet     magnesium oxide (MAG-OX) 400 (241.3 Mg) MG tablet      multivitamin, therapeutic with minerals (MULTI-VITAMIN) TABS tablet     simvastatin (ZOCOR) 5 MG tablet     tacrolimus (GENERIC EQUIVALENT) 0.5 MG capsule     vitamin D3 (CHOLECALCIFEROL) 2000 units (50 mcg) tablet     No current facility-administered medications for this visit.      Physical Exam:  There were no vitals taken for this visit. due to this being a virtual visit    GENERAL APPEARANCE: healthy, alert and in no distress  EYES: Eyes grossly normal to inspection, conjunctivae and sclerae normal  NECK: no masses reported by patient upon palpation  RESP: breathing effortlessly  MS: no musculoskeletal defects are noted   NEURO: Normal tone, mentation intact and speech normal  PSYCH: mentation appears normal and affect normal/bright    Data:  -- Last CBC with diff (11/18/20): 7.3>12.8<227 ANC 4.7    -- Plasma EBV PCR (9/2/20): 2540    -- Whole blood EBV PCR trends since first positive on 12/26/19      CTA chest/abdomen (7/2/20)  FINDINGS:    Chest:  The thyroid and could represent residual thymus, aortic branching pattern, heart size, pericardium, and esophagus are normal. The ascending aorta and main pulmonary artery are not dilated. No large central pulmonary embolism. No lymphadenopathy in the chest. The central tracheobronchial tree is patent with secretions/debris in the lower trachea and right mainstem bronchus. No pneumothorax or pleural effusion. No airspace consolidation. Stable emphysematous changes in the left lung most pronounced in the left lower lobe.    Postsurgical changes of cardiac transplantation with no significant change in the areas of narrowing in the aortic arch with aortic measurements as follows:  Sinuses of Valsalva: 2.9 x 3.0 x 3.0 cm  Sinotubular ridge: 2.6 x 2.6 cm  Ascending aorta: 2.6 x 2.6 cm  Proximal arch: 2.3 x 2.2 cm  Proximal arch at anastomotic site: 1.6 x 1.3 cm, grossly stable  Mid arch: 1.5 x 1.7 cm   Isthmus: 1.7 x 1.5 cm, previously 1.3 x 1.3 cm  Proximal descending  aorta: 2.7 x 2.8 cm  Distal descending aorta: 1.9 x 1.9 cm     Abdomen:  Stable position and orientation of the migrated aortic stent, again at the level of the celiac artery. Aortic origin of an accessory artery supplying the left gastric and left hepatic distributions. The stent remains partially compressed and there is no associated thrombus.    The liver, gallbladder, spleen, adrenal glands, pancreas, and kidneys are unremarkable. No abnormally dilated loops of bowel. No intra-abdominal free air or free fluid. Unchanged prominent nonenlarged retroperitoneal lymph nodes.     Bones and soft tissues:   Intact sternotomy wires. No acute or worrisome osseous lesions.                                                                   IMPRESSION:   1. Postoperative changes of cardiac transplantation with stable caliber change at the aortic anastomosis.  2. Stable migrated aortic stent at the level of the celiac artery without associated thrombus.  3. Stable emphysematous changes in the left lower lobe.     I have personally reviewed the examination and initial interpretation  and I agree with the findings.     LUPE ALAS MD    Assessment and Plan:  Laurel is a 20 year old young woman with a history of complex congenital heart disease s/p orthotopic heart transplant 2/17/04, aortic stenosis s/p multiple corrective procedures/stents and now persistent EBV viremia in her whole blood and plasma who is being seen in follow-up for evaluation and monitoring for PTLD.     Laurel was previously EBV negative by serology and PCR until August 2018 when she was first noted to have detectable EBV capsid IgM. She then remained EBV PCR negative and asymptomatic until December 2019 when she was first noted to have detectable whole blood EBV PCR and experienced a mono-like illness. She has since recovered from those symptoms, but continues to have a persistently elevated whole blood EBV PCR levels ranging from 150-550K and more  recently noted to have plasma EBV PCR level of 2450.    By history, she does not have any signs or symptoms concerning for PTLD. Additionally, her most recent imaging (CTA chest/abdomen) from 7/2/20 was reviewed and was also reassuring given there were no enlarged lymph nodes or splenomegaly noted in that study.    Given her status as a heart transplant patient on lifelong immunosuppression who recently experienced a primary EBV viral infection, she remains at risk for the development of PTLD. It was reassuring to see her whole blood EBV PCR level decline at the last 2 checks, but she was also found to have an elevated EBV PCR level in her plasma more suggestive with lytic as opposed to latent viral infection. She requires close follow up to monitor her regularly with labs and serial exams.    -- There is no indication at this time for PTLD directed therapy although she may qualify for anti-viral therapy based on Dr. Restrepo's ongoing recommendations  -- Plan to continue to follow EBV labs closely including a repeat plasma EBV level at her upcoming appointment with Cardiology and see her back in 3 months; in the interim, advised Laurel to contact our clinic if she does develop any symptoms or physical signs concerning for PTLD such as unexplained fevers, abdominal pain, new lumps or bumps, unintentional weight loss, GI upset, or worsening fatigue. She was provided with our contact information.    Jd Stevens MD  Pediatric Hematology/Oncology  [facility]  Pager [phone]

## 2020-12-29 ENCOUNTER — TELEPHONE (OUTPATIENT)
Dept: PEDIATRIC CARDIOLOGY | Facility: CLINIC | Age: 20
End: 2020-12-29

## 2020-12-29 DIAGNOSIS — Z94.1 HEART REPLACED BY TRANSPLANT (H): Primary | ICD-10-CM

## 2020-12-29 DIAGNOSIS — B27.00 EPSTEIN-BARR VIRUS VIREMIA: ICD-10-CM

## 2021-01-04 ENCOUNTER — RESULTS ONLY (OUTPATIENT)
Dept: OTHER | Facility: CLINIC | Age: 21
End: 2021-01-04

## 2021-01-04 ENCOUNTER — HOSPITAL ENCOUNTER (OUTPATIENT)
Dept: CARDIOLOGY | Facility: CLINIC | Age: 21
End: 2021-01-04
Attending: PEDIATRICS
Payer: COMMERCIAL

## 2021-01-04 ENCOUNTER — OFFICE VISIT (OUTPATIENT)
Dept: PEDIATRIC CARDIOLOGY | Facility: CLINIC | Age: 21
End: 2021-01-04
Attending: PEDIATRICS
Payer: COMMERCIAL

## 2021-01-04 ENCOUNTER — OFFICE VISIT (OUTPATIENT)
Dept: PHARMACY | Facility: CLINIC | Age: 21
End: 2021-01-04

## 2021-01-04 VITALS
HEART RATE: 117 BPM | OXYGEN SATURATION: 97 % | DIASTOLIC BLOOD PRESSURE: 71 MMHG | RESPIRATION RATE: 14 BRPM | WEIGHT: 130.51 LBS | SYSTOLIC BLOOD PRESSURE: 118 MMHG | HEIGHT: 65 IN | BODY MASS INDEX: 21.74 KG/M2

## 2021-01-04 DIAGNOSIS — B27.00 EBV (EPSTEIN-BARR VIRUS) VIREMIA: ICD-10-CM

## 2021-01-04 DIAGNOSIS — Z94.1 HEART REPLACED BY TRANSPLANT (H): ICD-10-CM

## 2021-01-04 DIAGNOSIS — Z94.1 HEART TRANSPLANT, ORTHOTOPIC, STATUS (H): Primary | ICD-10-CM

## 2021-01-04 DIAGNOSIS — I15.8 OTHER SECONDARY HYPERTENSION: ICD-10-CM

## 2021-01-04 DIAGNOSIS — B27.00 EPSTEIN-BARR VIRUS VIREMIA: ICD-10-CM

## 2021-01-04 DIAGNOSIS — E63.9 NUTRITIONAL DEFICIENCY: ICD-10-CM

## 2021-01-04 LAB
ALBUMIN SERPL-MCNC: 3.6 G/DL (ref 3.4–5)
ALP SERPL-CCNC: 50 U/L (ref 40–150)
ALT SERPL W P-5'-P-CCNC: 17 U/L (ref 0–50)
ANION GAP SERPL CALCULATED.3IONS-SCNC: 5 MMOL/L (ref 3–14)
AST SERPL W P-5'-P-CCNC: 14 U/L (ref 0–45)
BASOPHILS # BLD AUTO: 0.1 10E9/L (ref 0–0.2)
BASOPHILS NFR BLD AUTO: 0.8 %
BILIRUB SERPL-MCNC: 0.3 MG/DL (ref 0.2–1.3)
BUN SERPL-MCNC: 14 MG/DL (ref 7–30)
CALCIUM SERPL-MCNC: 8.8 MG/DL (ref 8.5–10.1)
CHLORIDE SERPL-SCNC: 106 MMOL/L (ref 94–109)
CK SERPL-CCNC: 55 U/L (ref 30–225)
CMV IGG SERPL QL IA: <0.2 AI (ref 0–0.8)
CMV IGM SERPL QL IA: <0.2 AI (ref 0–0.8)
CO2 SERPL-SCNC: 27 MMOL/L (ref 20–32)
CREAT SERPL-MCNC: 0.77 MG/DL (ref 0.52–1.04)
DEPRECATED CALCIDIOL+CALCIFEROL SERPL-MC: 38 UG/L (ref 20–75)
DIFFERENTIAL METHOD BLD: NORMAL
EBV NA IGG SER QL IA: 0.9 AI (ref 0–0.8)
EBV VCA IGG SER QL IA: >8 AI (ref 0–0.8)
EBV VCA IGM SER QL IA: 0.2 AI (ref 0–0.8)
EOSINOPHIL # BLD AUTO: 0.6 10E9/L (ref 0–0.7)
EOSINOPHIL NFR BLD AUTO: 8.5 %
ERYTHROCYTE [DISTWIDTH] IN BLOOD BY AUTOMATED COUNT: 12.8 % (ref 10–15)
GFR SERPL CREATININE-BSD FRML MDRD: >90 ML/MIN/{1.73_M2}
GLUCOSE SERPL-MCNC: 94 MG/DL (ref 70–99)
HCT VFR BLD AUTO: 42.3 % (ref 35–47)
HGB BLD-MCNC: 13.7 G/DL (ref 11.7–15.7)
IMM GRANULOCYTES # BLD: 0 10E9/L (ref 0–0.4)
IMM GRANULOCYTES NFR BLD: 0.3 %
LYMPHOCYTES # BLD AUTO: 1.1 10E9/L (ref 0.8–5.3)
LYMPHOCYTES NFR BLD AUTO: 14.7 %
MAGNESIUM SERPL-MCNC: 1.4 MG/DL (ref 1.6–2.3)
MCH RBC QN AUTO: 27.5 PG (ref 26.5–33)
MCHC RBC AUTO-ENTMCNC: 32.4 G/DL (ref 31.5–36.5)
MCV RBC AUTO: 85 FL (ref 78–100)
MONOCYTES # BLD AUTO: 0.7 10E9/L (ref 0–1.3)
MONOCYTES NFR BLD AUTO: 9.9 %
NEUTROPHILS # BLD AUTO: 4.7 10E9/L (ref 1.6–8.3)
NEUTROPHILS NFR BLD AUTO: 65.8 %
NRBC # BLD AUTO: 0 10*3/UL
NRBC BLD AUTO-RTO: 0 /100
NT-PROBNP SERPL-MCNC: 186 PG/ML (ref 0–125)
PHOSPHATE SERPL-MCNC: 3.9 MG/DL (ref 2.5–4.5)
PLATELET # BLD AUTO: 267 10E9/L (ref 150–450)
POTASSIUM SERPL-SCNC: 3.4 MMOL/L (ref 3.4–5.3)
PROT SERPL-MCNC: 8 G/DL (ref 6.8–8.8)
RBC # BLD AUTO: 4.98 10E12/L (ref 3.8–5.2)
SODIUM SERPL-SCNC: 138 MMOL/L (ref 133–144)
TACROLIMUS BLD-MCNC: 6.2 UG/L (ref 5–15)
TME LAST DOSE: NORMAL H
TROPONIN I SERPL-MCNC: <0.015 UG/L (ref 0–0.04)
WBC # BLD AUTO: 7.2 10E9/L (ref 4–11)

## 2021-01-04 PROCEDURE — 86664 EPSTEIN-BARR NUCLEAR ANTIGEN: CPT | Performed by: PEDIATRICS

## 2021-01-04 PROCEDURE — 86833 HLA CLASS II HIGH DEFIN QUAL: CPT | Performed by: PEDIATRICS

## 2021-01-04 PROCEDURE — 84484 ASSAY OF TROPONIN QUANT: CPT | Performed by: PEDIATRICS

## 2021-01-04 PROCEDURE — 84100 ASSAY OF PHOSPHORUS: CPT | Performed by: PEDIATRICS

## 2021-01-04 PROCEDURE — 80053 COMPREHEN METABOLIC PANEL: CPT | Performed by: PEDIATRICS

## 2021-01-04 PROCEDURE — 82550 ASSAY OF CK (CPK): CPT | Performed by: PEDIATRICS

## 2021-01-04 PROCEDURE — 99605 MTMS BY PHARM NP 15 MIN: CPT | Performed by: PHARMACIST

## 2021-01-04 PROCEDURE — 86665 EPSTEIN-BARR CAPSID VCA: CPT | Performed by: PEDIATRICS

## 2021-01-04 PROCEDURE — 83735 ASSAY OF MAGNESIUM: CPT | Performed by: PEDIATRICS

## 2021-01-04 PROCEDURE — 83880 ASSAY OF NATRIURETIC PEPTIDE: CPT | Performed by: PEDIATRICS

## 2021-01-04 PROCEDURE — 87799 DETECT AGENT NOS DNA QUANT: CPT | Performed by: PEDIATRICS

## 2021-01-04 PROCEDURE — 86832 HLA CLASS I HIGH DEFIN QUAL: CPT | Performed by: PEDIATRICS

## 2021-01-04 PROCEDURE — 93005 ELECTROCARDIOGRAM TRACING: CPT

## 2021-01-04 PROCEDURE — 82306 VITAMIN D 25 HYDROXY: CPT | Performed by: PEDIATRICS

## 2021-01-04 PROCEDURE — 93306 TTE W/DOPPLER COMPLETE: CPT

## 2021-01-04 PROCEDURE — 90686 IIV4 VACC NO PRSV 0.5 ML IM: CPT

## 2021-01-04 PROCEDURE — 80197 ASSAY OF TACROLIMUS: CPT | Performed by: PEDIATRICS

## 2021-01-04 PROCEDURE — G0008 ADMIN INFLUENZA VIRUS VAC: HCPCS

## 2021-01-04 PROCEDURE — 99214 OFFICE O/P EST MOD 30 MIN: CPT | Mod: 25 | Performed by: PEDIATRICS

## 2021-01-04 PROCEDURE — 85025 COMPLETE CBC W/AUTO DIFF WBC: CPT | Performed by: PEDIATRICS

## 2021-01-04 PROCEDURE — G0463 HOSPITAL OUTPT CLINIC VISIT: HCPCS | Mod: 25

## 2021-01-04 PROCEDURE — 86645 CMV ANTIBODY IGM: CPT | Performed by: PEDIATRICS

## 2021-01-04 PROCEDURE — 36415 COLL VENOUS BLD VENIPUNCTURE: CPT | Performed by: PEDIATRICS

## 2021-01-04 PROCEDURE — 250N000011 HC RX IP 250 OP 636

## 2021-01-04 PROCEDURE — 93306 TTE W/DOPPLER COMPLETE: CPT | Mod: 26 | Performed by: PEDIATRICS

## 2021-01-04 PROCEDURE — 86644 CMV ANTIBODY: CPT | Performed by: PEDIATRICS

## 2021-01-04 RX ORDER — BIOTIN 1 MG
2000 TABLET ORAL DAILY
COMMUNITY

## 2021-01-04 RX ORDER — CALCIUM CARBONATE 300MG(750)
400 TABLET,CHEWABLE ORAL DAILY
Qty: 30 TABLET | Refills: 3 | Status: SHIPPED | OUTPATIENT
Start: 2021-01-04 | End: 2021-06-24

## 2021-01-04 ASSESSMENT — MIFFLIN-ST. JEOR: SCORE: 1357.26

## 2021-01-04 NOTE — NURSING NOTE
"Chief Complaint   Patient presents with     RECHECK     heart transplant follow up      Vitals:    01/04/21 0832   BP: 118/71   BP Location: Right arm   Patient Position: Chair   Cuff Size: Adult Regular   Pulse: 117   Resp: 14   SpO2: 97%   Weight: 130 lb 8.2 oz (59.2 kg)   Height: 5' 4.65\" (164.2 cm)     Melissa Valdes LPN  January 4, 2021  "

## 2021-01-04 NOTE — PATIENT INSTRUCTIONS
Plan:   1. Follow Up appt: 6 months with labs, CXR (does not need to be scheduled) and office visit to include ECHO and EKG - For annual visits transplant  Yanely Manning will contact you. Yanely can be reached at [phone]  2. Every 3 month transplant labs due March 2021 but may need an EBV sooner depending on labs from today.  3. Restart magnesium 400 mg daily. You could try a prenatal vitamin for better vitamin D supplementation as well as iron and other vitamins.   4. We recommend you and all family members receive the influenza (flu) vaccination   5. Transplant office will call you with immunosuppression lab results and virologies    Please call your transplant coordinator at the Transplant office M-F from 8 AM - 4:30 PM if you have future questions/concerns or change in status such as:    Fever above 100.4    High heart rate   Nausea/vomitting   Fatigue or generally feeling unwell  Mary Jo Pope: [phone] or Freida Nunez: [phone].   For after hour and weekend concerns please page on-call transplant coordinator at [phone] Job Code Pager [phone]    For emergencies call 911 AND call Transplant coordinator on-call at [phone] Job Code Pager [phone]

## 2021-01-04 NOTE — LETTER
2021      RE: Laurel Santoyo  [address] [address]       Misty Abbasi MD   [facility]   [facility] [facility] [place] [address]    RE: Laurel Santoyo  MRN: [id number]  : 2000     Dear Dr. Abbasi:     We had the pleasure of seeing your patient, Laurel Santoyo, in the Pediatric Heart Transplant Clinic at the [facility] on 2021 for her routine post-transplant followup now 17 years after transplant.  As you know, she is now a 20 year old with a history of complex congenital heart disease s/p multiple palliative surgeries, who underwent orthotopic heart transplant on 2004.      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  She underwent heart cath in 2011 for concern over aortic arch narrowing by echo, and heart cath was complicated by episode of complete heart block requiring cpr with spontaneous recovery of rhythm.  She was found to have both ascending aortic narrowing at anastomotic site as well as mild descending aortic narrowing.  She underwent stent placement in her coarctation on 2012, at which time she was also found to have mild coronary vasculopathy.  She was started on sirolimus (rapamune) on 12 because of the coronary findings, and once her level was therapeutic her tacrolimus level was discontinued.  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  She recovered well after stopping these medications and treating with tpn and antibiotics, and was discharged on 12.      She was diagnosed with EBV viremia in 2019,  and has had ongoing EBV Viremia that we are following every 3 months.     Today she is in clinic for routine followup. She reports no concerns on a cardiac or respiratory basis.  She is feeling well, good energy, active.She is now taking some online classes at [employer] and is planning to continue this for now given COVID. She continues to have some issue with headaches, anxiety/depression and is working with a therapist but is now off antidepressants. She has not had any fever, abdominal cramps/pain, diarrhea.  Comprehensive review of systems is otherwise negative today.  There have been no changes to the past medical, family, or social history other than as noted above.     Current medications include:   Prescription Medications as of 1/4/2021       Rx Number Disp Refills Start End Last Dispensed Date Next Fill Date Owning Pharmacy    amoxicillin (AMOXIL) 500 MG capsule  4 capsule 0 6/15/2020    [id number] - [place], [place] - [address] [facility]    Sig: TAKE 4 CAPSULES BY MOUTH ONCE FOR 1 DOSE, 30-60 MINUTES PRIOR TO DENTAL CLEANING    Class: E-Prescribe    aspirin EC 81 MG EC tablet  30 tablet 6 7/12/2011    [facility]    Sig: Take 1 tablet by mouth daily.    Class: OTC    Route: Oral    lisinopril (PRINIVIL/ZESTRIL) 10 MG tablet  90 tablet 3 10/22/2019    [facility] [place] [address]    Sig: Take 1 tablet (10 mg) by mouth daily    Class: E-Prescribe    Route: Oral    magnesium oxide (MAG-OX) 400 (241.3 Mg) MG tablet  90 tablet 3 12/26/2019    [id number] - [place], [place] - [address] [facility]    Sig: Take 1 tablet (400 mg) by mouth daily    Class: E-Prescribe    Route: Oral    multivitamin, therapeutic with minerals (MULTI-VITAMIN) TABS tablet  30 each 0 8/24/2017    [facility] [place] [address]    Sig: Take 1  "tablet by mouth daily    Class: OTC    Route: Oral    Cosign for Ordering: Accepted by Misty Linton MD on 8/25/2017  7:24 AM    simvastatin (ZOCOR) 5 MG tablet  90 tablet 3 8/18/2020    [facility]/[place] [address]     Sig: Take 1 tablet (5 mg) by mouth every evening    Class: E-Prescribe    Route: Oral    tacrolimus (GENERIC EQUIVALENT) 0.5 MG capsule  720 capsule 3 4/28/2020    [facility]/[place] [address]    Sig: Take 4 capsules (2 mg) by mouth 2 times daily Take 2 mg (4 capsules) twice daily    Class: E-Prescribe    Route: Oral    vitamin D3 (CHOLECALCIFEROL) 2000 units (50 mcg) tablet  90 tablet 3 4/6/2020    [facility]/[place] [address]    Sig: Take 1 tablet (2,000 Units) by mouth daily    Class: E-Prescribe    Route: Oral        On exam today, she is awake, alert, in no acute distress. Vital signs include: /71 (BP Location: Right arm, Patient Position: Chair, Cuff Size: Adult Regular)   Pulse 117   Resp 14   Ht 1.642 m (5' 4.65\")   Wt 59.2 kg (130 lb 8.2 oz)   SpO2 97%   BMI 21.96 kg/m    Her lungs are clear to auscultation bilaterally with no wheezes, rales or rhonchi.  Cardiovascular exam is notable for a regular rate and rhythm with a normal S1 and normal physiologically splitting S2.  There is a grade 2/6 systolic ejection murmur at the left upper sternal border and left upper back.  There are no rubs or gallops on exam today.  Abdomen is soft, nontender..  Extremities are warm and well perfused with no peripheral clubbing, edema, or cyanosis.  There is very subtle radial femoral pulse delay with 1+ femoral pulses.  The exam is otherwise unremarkable.       Laurel had evaluation today including labs, imaging, echocardiogram, ecg.   Her echocardiogram showed:  Patient after orthotopic heart transplant. (2/17/04) Normal right and left ventricular systolic " function. The calculated biplane left ventricular  ejection fraction is 62 %. LVRI 0.6. Upper mild mitral valve insufficiency. Mild aortic valve insufficiency. Stent in the abdominal aorta next to the celiac artery. There is blunted systolic upstroke Doppler flow pattern with diastolic run-off in the descending abdominal aorta. In the proximal descending thoracic aorta there is a peak gradient of 35 mm Hg with a mean  gradient of 9 mmHg. No pericardial effusion. No significant change from last echocardiogram.    Her labs today:  She had a comprehensive metabolic panel which was normal, specifically the bun was 14 and creatinine of 0.77.  Her magnesium level was low at 1.4  Her LFTs were normal.  She had a pro-bnp of 186 and troponin of <0.015.  She has a tacrolimus level that is pending.  She had a cbc remarkable for normal wbc of 7.2, hemoglobin normal at 13.7, and platelets normal at 638941.     Labs from 11/18/20: CMV PCR negative with negative IgG and IgM,   11/18/20: EBV positive at 426077, log 5.0 (down from prior 9/2/20 with PCR [id number], log 5.4, peak value was 7/2/20 with PCR [id number] Log 5.7) EBNA IgG weakly positive for first time, Capsid IgG positive and IgM negative.       CT from July 2020 showed:  1. Postoperative changes of cardiac transplantation with stable caliber change at the aortic anastomosis.  2. Stable migrated aortic stent at the level of the celiac artery without associated thrombus.  3. Stable emphysematous changes in the left lower lobe.    Proximal arch: 2.3 x 2.2 cm  Proximal arch at anastomotic site: 1.6 x 1.3 cm, grossly stable  Mid arch: 1.5 x 1.7 cm   Isthmus: 1.7 x 1.5 cm, previously 1.3 x 1.3 cm  Proximal descending aorta: 2.7 x 2.8 cm  Distal descending aorta: 1.9 x 1.9 cm    Her PRA are as follows:  7/2/2020: no donor specific antibodies  12/26/2019: no donor specific antibodies  8/12/2019: donor specific antibodies to DR8 ()  3/4/19: donor specific antibodies to DR8 (MFI  569)  8/16/18: donor specific antibodies to DR8 (MFI 1187)  3/5/18: donor specific antibodies to DR 8 ()  8/24/17: no donor specific antibodies  3/6/17: donor specific antibodies to DR4 (MFI 3707)  8/3/16: donor specific antibodies to DR4 (MFI 2756)  8/10/15: donor specific antibodies to DR4 (MFI 2785), DPB1*04:01 (MFI 1478)  2/12/15: donor specific antibodies to: DR4 htw=3321, DPB1*04:01 vba=979   8/18/14:  donor specific antibodies to DR4 with MFI 2325 (down from 2/13/14 MFI of 3184), DPB1*04:01 MFI 1537 and B44 AZO088.     Her last biopsy was 7/11/11 and showed negative C3d/C4d staining, no evidence of rejection grade 0.      Laurel is now 17 years out from orthotopic heart transplant, which was performed on February 17, 2004.  She also underwent subaortic membrane resection on November 12, 2008.  She has had no recurrence of her subaortic membrane. She also underwent coarctation stent placement on 8/14/2012, and unfortunately on followup on 11/5/12 was found to have stent migration to abdominal aorta, although not thought to be causing any obstruction to vessels by CT angiography.  She also was diagnosed with severe colitis, likely secondary to cellcept, that has resolved now off cellcept therapy, however coincidentally she was  converted to rapamune just prior to that event which may have been contributory as well.  We have continued to maintain her for now off cellcept and rapamune, back on tacrolimus.      Overall she is doing well from a cardiac standpoint at this time with no signs of rejection, with stable mild narrowing in her distal aortic arch.     Recommendations today:  1. Follow Up appt: 6 months with labs, CXR (does not need to be scheduled) and office visit to include ECHO and EKG - For annual visits transplant  Yanely Manning will contact you. Yanely can be reached at [phone]  2. Every 3 month transplant labs due March 2021 but may need an EBV sooner depending on labs from  today.  3. Restart magnesium 400 mg daily. You could try a prenatal vitamin for better vitamin D supplementation as well as iron and other vitamins.   4. We recommend you and all family members receive the influenza (flu) vaccination   5. Transplant office will call you with immunosuppression lab results and virologies    Thank you for allowing us to see Laurel and participate in her care.  Please let us know if you have any questions.     Diagnosis summary:  1. 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 Original diagnosis: double inlet left ventricle, d-TGA                     1. S/p PA band and tricuspid valve repair (6/2000)                     2. Developed subaortic obstruction and underwent DKS, atrial septectomy, AV valve repair and BT shunt placement, postoperative ECMO (2/2001)                     3. Shunt revision and PA plasty with postoperative ECMO (8/2001)           B. Had ongoing moderate to severe AV valve regurgitation and was felt to be poor single ventricle                            candidate so was referred to transplant  2. 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 S/p resection (11/12/2008)  3. Aortic arch narrowing            A. S/p stent placement (8/14/2012), stent found on 11/5/12 to have migrated to abdominal aorta  4. 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 cellcept induced colitis (diagnosed 11/5/12), now resolved off cellcept.  6. Iron deficiency anemia (diagnosed 8/3/16, resolved but now new anemia again summer 2019)  7. EBV viremia (diagnosed December 2019) without evidence of PTLD      Sincerely,      Misty Linton M.D.,    in Pediatrics        CC  Patient Care Team:  Misty Abbasi MD as PCP - General (Pediatrics)  Misty Linton MD as MD (Pediatric Cardiology)  Misty Abbasi MD as Referring Physician (Pediatrics)  Andrews Kinsey, PhD DEANNE  (Neuropsychology)  Lynn Lee MD as Physician (Internal Medicine)  Lynn Lee MD as Physician  Freida Nunez RN as Registered Nurse (Pediatrics)  Yanely Manning MA as Medical Assistant (Transplant Surgery)  Misty Linton MD as Assigned Pediatric Specialist Provider  Ton Mcnally MD as Assigned PCP  MIKALA SCOTT    Copy to patient  CARLITA BASURTO  [address] [facility] [address]                                 Misty Linton MD

## 2021-01-04 NOTE — PROGRESS NOTES
Misty Abbasi MD   [facility]   [address] [place] [id number]    RE: Laurel Santoyo  MRN: [id number]  : 2000     Dear Dr. Abbasi:     We had the pleasure of seeing your patient, Laurel Santoyo, in the Pediatric Heart Transplant Clinic at the [facility] on 2021 for her routine post-transplant followup now 17 years after transplant.  As you know, she is now a 20 year old with a history of complex congenital heart disease s/p multiple palliative surgeries, who underwent orthotopic heart transplant on 2004.      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  She underwent heart cath in 2011 for concern over aortic arch narrowing by echo, and heart cath was complicated by episode of complete heart block requiring cpr with spontaneous recovery of rhythm.  She was found to have both ascending aortic narrowing at anastomotic site as well as mild descending aortic narrowing.  She underwent stent placement in her coarctation on 2012, at which time she was also found to have mild coronary vasculopathy.  She was started on sirolimus (rapamune) on 12 because of the coronary findings, and once her level was therapeutic her tacrolimus level was discontinued.  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  She recovered well after stopping these medications and treating with tpn and antibiotics, and was discharged on 12.      She was diagnosed with EBV viremia in 2019, and has had ongoing EBV Viremia that we are following every 3 months.     Today she is  in clinic for routine followup. She reports no concerns on a cardiac or respiratory basis.  She is feeling well, good energy, active.She is now taking some online classes at [employer] and is planning to continue this for now given COVID. She continues to have some issue with headaches, anxiety/depression and is working with a therapist but is now off antidepressants. She has not had any fever, abdominal cramps/pain, diarrhea.  Comprehensive review of systems is otherwise negative today.  There have been no changes to the past medical, family, or social history other than as noted above.     Current medications include:   Prescription Medications as of 1/4/2021       Rx Number Disp Refills Start End Last Dispensed Date Next Fill Date Owning Pharmacy    amoxicillin (AMOXIL) 500 MG capsule  4 capsule 0 6/15/2020    [id number] - [place], [place] - [address] AT [facility]    Sig: TAKE 4 CAPSULES BY MOUTH ONCE FOR 1 DOSE, 30-60 MINUTES PRIOR TO DENTAL CLEANING    Class: E-Prescribe    aspirin EC 81 MG EC tablet  30 tablet 6 7/12/2011    [facility]    Sig: Take 1 tablet by mouth daily.    Class: OTC    Route: Oral    lisinopril (PRINIVIL/ZESTRIL) 10 MG tablet  90 tablet 3 10/22/2019    [facility] [address]    Sig: Take 1 tablet (10 mg) by mouth daily    Class: E-Prescribe    Route: Oral    magnesium oxide (MAG-OX) 400 (241.3 Mg) MG tablet  90 tablet 3 12/26/2019    [id number] [place], [place] - [address] AT [facility]    Sig: Take 1 tablet (400 mg) by mouth daily    Class: E-Prescribe    Route: Oral    multivitamin, therapeutic with minerals (MULTI-VITAMIN) TABS tablet  30 each 0 8/24/2017    [facility] - [place] [address]    Sig: Take 1 tablet by mouth daily    Class: OTC    Route: Oral    Cosign for Ordering: Accepted by  "Misty Linton MD on 8/25/2017  7:24 AM    simvastatin (ZOCOR) 5 MG tablet  90 tablet 3 8/18/2020    [facility] [address] [address]    Sig: Take 1 tablet (5 mg) by mouth every evening    Class: E-Prescribe    Route: Oral    tacrolimus (GENERIC EQUIVALENT) 0.5 MG capsule  720 capsule 3 4/28/2020    [facility]/[place] [address]    Sig: Take 4 capsules (2 mg) by mouth 2 times daily Take 2 mg (4 capsules) twice daily    Class: E-Prescribe    Route: Oral    vitamin D3 (CHOLECALCIFEROL) 2000 units (50 mcg) tablet  90 tablet 3 4/6/2020    [facility]/[place] [address] [address]    Sig: Take 1 tablet (2,000 Units) by mouth daily    Class: E-Prescribe    Route: Oral        On exam today, she is awake, alert, in no acute distress. Vital signs include: /71 (BP Location: Right arm, Patient Position: Chair, Cuff Size: Adult Regular)   Pulse 117   Resp 14   Ht 1.642 m (5' 4.65\")   Wt 59.2 kg (130 lb 8.2 oz)   SpO2 97%   BMI 21.96 kg/m    Her lungs are clear to auscultation bilaterally with no wheezes, rales or rhonchi.  Cardiovascular exam is notable for a regular rate and rhythm with a normal S1 and normal physiologically splitting S2.  There is a grade 2/6 systolic ejection murmur at the left upper sternal border and left upper back.  There are no rubs or gallops on exam today.  Abdomen is soft, nontender..  Extremities are warm and well perfused with no peripheral clubbing, edema, or cyanosis.  There is very subtle radial femoral pulse delay with 1+ femoral pulses.  The exam is otherwise unremarkable.       Laurel had evaluation today including labs, imaging, echocardiogram, ecg.   Her echocardiogram showed:  Patient after orthotopic heart transplant. (2/17/04) Normal right and left ventricular systolic function. The calculated biplane left ventricular  ejection fraction is 62 %. LVRI 0.6. Upper " mild mitral valve insufficiency. Mild aortic valve insufficiency. Stent in the abdominal aorta next to the celiac artery. There is blunted systolic upstroke Doppler flow pattern with diastolic run-off in the descending abdominal aorta. In the proximal descending thoracic aorta there is a peak gradient of 35 mm Hg with a mean  gradient of 9 mmHg. No pericardial effusion. No significant change from last echocardiogram.    Her labs today:  She had a comprehensive metabolic panel which was normal, specifically the bun was 14 and creatinine of 0.77.  Her magnesium level was low at 1.4  Her LFTs were normal.  She had a pro-bnp of 186 and troponin of <0.015.  She has a tacrolimus level that is pending.  She had a cbc remarkable for normal wbc of 7.2, hemoglobin normal at 13.7, and platelets normal at 036316.     Labs from 11/18/20: CMV PCR negative with negative IgG and IgM,   11/18/20: EBV positive at 376047, log 5.0 (down from prior 9/2/20 with PCR [id number], log 5.4, peak value was 7/2/20 with PCR [id number] Log 5.7) EBNA IgG weakly positive for first time, Capsid IgG positive and IgM negative.       CT from July 2020 showed:  1. Postoperative changes of cardiac transplantation with stable caliber change at the aortic anastomosis.  2. Stable migrated aortic stent at the level of the celiac artery without associated thrombus.  3. Stable emphysematous changes in the left lower lobe.    Proximal arch: 2.3 x 2.2 cm  Proximal arch at anastomotic site: 1.6 x 1.3 cm, grossly stable  Mid arch: 1.5 x 1.7 cm   Isthmus: 1.7 x 1.5 cm, previously 1.3 x 1.3 cm  Proximal descending aorta: 2.7 x 2.8 cm  Distal descending aorta: 1.9 x 1.9 cm    Her PRA are as follows:  7/2/2020: no donor specific antibodies  12/26/2019: no donor specific antibodies  8/12/2019: donor specific antibodies to DR8 ()  3/4/19: donor specific antibodies to DR8 ()  8/16/18: donor specific antibodies to DR8 (MFI 1187)  3/5/18: donor specific antibodies to  DR 8 ()  8/24/17: no donor specific antibodies  3/6/17: donor specific antibodies to DR4 (MFI 3707)  8/3/16: donor specific antibodies to DR4 (MFI 2756)  8/10/15: donor specific antibodies to DR4 (MFI 2785), DPB1*04:01 (MFI 1478)  2/12/15: donor specific antibodies to: DR4 iaq=9493, DPB1*04:01 tve=768   8/18/14:  donor specific antibodies to DR4 with MFI 2325 (down from 2/13/14 MFI of 3184), DPB1*04:01 MFI 1537 and B44 [id number].     Her last biopsy was 7/11/11 and showed negative C3d/C4d staining, no evidence of rejection grade 0.      Laurel is now 17 years out from orthotopic heart transplant, which was performed on February 17, 2004.  She also underwent subaortic membrane resection on November 12, 2008.  She has had no recurrence of her subaortic membrane. She also underwent coarctation stent placement on 8/14/2012, and unfortunately on followup on 11/5/12 was found to have stent migration to abdominal aorta, although not thought to be causing any obstruction to vessels by CT angiography.  She also was diagnosed with severe colitis, likely secondary to cellcept, that has resolved now off cellcept therapy, however coincidentally she was  converted to rapamune just prior to that event which may have been contributory as well.  We have continued to maintain her for now off cellcept and rapamune, back on tacrolimus.      Overall she is doing well from a cardiac standpoint at this time with no signs of rejection, with stable mild narrowing in her distal aortic arch.     Recommendations today:  1. Follow Up appt: 6 months with labs, CXR (does not need to be scheduled) and office visit to include ECHO and EKG - For annual visits transplant  Yanely Manning will contact you. Yanely can be reached at [phone]  2. Every 3 month transplant labs due March 2021 but may need an EBV sooner depending on labs from today.  3. Restart magnesium 400 mg daily. You could try a prenatal vitamin for better vitamin D  supplementation as well as iron and other vitamins.   4. We recommend you and all family members receive the influenza (flu) vaccination   5. Transplant office will call you with immunosuppression lab results and virologies    Thank you for allowing us to see Laurel and participate in her care.  Please let us know if you have any questions.     Diagnosis summary:  1. 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 Original diagnosis: double inlet left ventricle, d-TGA                     1. S/p PA band and tricuspid valve repair (6/2000)                     2. Developed subaortic obstruction and underwent DKS, atrial septectomy, AV valve repair and BT shunt placement, postoperative ECMO (2/2001)                     3. Shunt revision and PA plasty with postoperative ECMO (8/2001)           B. Had ongoing moderate to severe AV valve regurgitation and was felt to be poor single ventricle                            candidate so was referred to transplant  2. 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 S/p resection (11/12/2008)  3. Aortic arch narrowing            A. S/p stent placement (8/14/2012), stent found on 11/5/12 to have migrated to abdominal aorta  4. 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 cellcept induced colitis (diagnosed 11/5/12), now resolved off cellcept.  6. Iron deficiency anemia (diagnosed 8/3/16, resolved but now new anemia again summer 2019)  7. EBV viremia (diagnosed December 2019) without evidence of PTLD      Sincerely,      Misty Linton M.D.,    in Pediatrics        CC  Patient Care Team:  Misty Abbasi MD as PCP - General (Pediatrics)  Misty Linton MD as MD (Pediatric Cardiology)  Misty Abbasi MD as Referring Physician (Pediatrics)  Andrews Kinsey, PhD LP (Neuropsychology)  Lynn Lee MD as Physician (Internal Medicine)  Lynn Lee MD as  Physician  Freida Nunez, WARD as Registered Nurse (Pediatrics)  Yanely Manning MA as Medical Assistant (Transplant Surgery)  iMsty Linton MD as Assigned Pediatric Specialist Provider  Ton Mcnally MD as Assigned PCP  MIKALA SCOTT    Copy to patient  CARLITA BASURTO  [address] [facility] [address]

## 2021-01-04 NOTE — LETTER
2021      RE: Laurel Santoyo  [address] [facility] [address]       Misty Abbasi MD   [facility]   [facility] [place] [address]    RE: Laurel Santoyo  MRN: [id number]  : 2000     Dear Dr. Abbasi:     We had the pleasure of seeing your patient, Laurel Santoyo, in the Pediatric Heart Transplant Clinic at the [facility] on 2021 for her routine post-transplant followup now 17 years after transplant.  As you know, she is now a 20 year old with a history of complex congenital heart disease s/p multiple palliative surgeries, who underwent orthotopic heart transplant on 2004.      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  She underwent heart cath in 2011 for concern over aortic arch narrowing by echo, and heart cath was complicated by episode of complete heart block requiring cpr with spontaneous recovery of rhythm.  She was found to have both ascending aortic narrowing at anastomotic site as well as mild descending aortic narrowing.  She underwent stent placement in her coarctation on 2012, at which time she was also found to have mild coronary vasculopathy.  She was started on sirolimus (rapamune) on 12 because of the coronary findings, and once her level was therapeutic her tacrolimus level was discontinued.  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  She recovered well after stopping these medications and treating with tpn and antibiotics, and was discharged on 12.      She was diagnosed with EBV viremia in 2019,  and has had ongoing EBV Viremia that we are following every 3 months.     Today she is in clinic for routine followup. She reports no concerns on a cardiac or respiratory basis.  She is feeling well, good energy, active.She is now taking some online classes at [employer] and is planning to continue this for now given COVID. She continues to have some issue with headaches, anxiety/depression and is working with a therapist but is now off antidepressants. She has not had any fever, abdominal cramps/pain, diarrhea.  Comprehensive review of systems is otherwise negative today.  There have been no changes to the past medical, family, or social history other than as noted above.     Current medications include:   Prescription Medications as of 1/4/2021       Rx Number Disp Refills Start End Last Dispensed Date Next Fill Date Owning Pharmacy    amoxicillin (AMOXIL) 500 MG capsule  4 capsule 0 6/15/2020    [id number] - [place], [place] - [address] [facility]    Sig: TAKE 4 CAPSULES BY MOUTH ONCE FOR 1 DOSE, 30-60 MINUTES PRIOR TO DENTAL CLEANING    Class: E-Prescribe    aspirin EC 81 MG EC tablet  30 tablet 6 7/12/2011    [facility]    Sig: Take 1 tablet by mouth daily.    Class: OTC    Route: Oral    lisinopril (PRINIVIL/ZESTRIL) 10 MG tablet  90 tablet 3 10/22/2019    [facility] [place] [address]    Sig: Take 1 tablet (10 mg) by mouth daily    Class: E-Prescribe    Route: Oral    magnesium oxide (MAG-OX) 400 (241.3 Mg) MG tablet  90 tablet 3 12/26/2019    [id number] [place] [place] - [address] [facility]    Sig: Take 1 tablet (400 mg) by mouth daily    Class: E-Prescribe    Route: Oral    multivitamin, therapeutic with minerals (MULTI-VITAMIN) TABS tablet  30 each 0 8/24/2017    [facility] [place] [address] [address]    Sig: Take 1  "tablet by mouth daily    Class: OTC    Route: Oral    Cosign for Ordering: Accepted by Misty Linton MD on 8/25/2017  7:24 AM    simvastatin (ZOCOR) 5 MG tablet  90 tablet 3 8/18/2020    [facility]/[place] [address]     Sig: Take 1 tablet (5 mg) by mouth every evening    Class: E-Prescribe    Route: Oral    tacrolimus (GENERIC EQUIVALENT) 0.5 MG capsule  720 capsule 3 4/28/2020    [facility]/[place] [address]    Sig: Take 4 capsules (2 mg) by mouth 2 times daily Take 2 mg (4 capsules) twice daily    Class: E-Prescribe    Route: Oral    vitamin D3 (CHOLECALCIFEROL) 2000 units (50 mcg) tablet  90 tablet 3 4/6/2020    [facility]/[place] [address]    Sig: Take 1 tablet (2,000 Units) by mouth daily    Class: E-Prescribe    Route: Oral        On exam today, she is awake, alert, in no acute distress. Vital signs include: /71 (BP Location: Right arm, Patient Position: Chair, Cuff Size: Adult Regular)   Pulse 117   Resp 14   Ht 1.642 m (5' 4.65\")   Wt 59.2 kg (130 lb 8.2 oz)   SpO2 97%   BMI 21.96 kg/m    Her lungs are clear to auscultation bilaterally with no wheezes, rales or rhonchi.  Cardiovascular exam is notable for a regular rate and rhythm with a normal S1 and normal physiologically splitting S2.  There is a grade 2/6 systolic ejection murmur at the left upper sternal border and left upper back.  There are no rubs or gallops on exam today.  Abdomen is soft, nontender..  Extremities are warm and well perfused with no peripheral clubbing, edema, or cyanosis.  There is very subtle radial femoral pulse delay with 1+ femoral pulses.  The exam is otherwise unremarkable.       Laurel had evaluation today including labs, imaging, echocardiogram, ecg.   Her echocardiogram showed:  Patient after orthotopic heart transplant. (2/17/04) Normal right and left ventricular systolic " function. The calculated biplane left ventricular  ejection fraction is 62 %. LVRI 0.6. Upper mild mitral valve insufficiency. Mild aortic valve insufficiency. Stent in the abdominal aorta next to the celiac artery. There is blunted systolic upstroke Doppler flow pattern with diastolic run-off in the descending abdominal aorta. In the proximal descending thoracic aorta there is a peak gradient of 35 mm Hg with a mean  gradient of 9 mmHg. No pericardial effusion. No significant change from last echocardiogram.    Her labs today:  She had a comprehensive metabolic panel which was normal, specifically the bun was 14 and creatinine of 0.77.  Her magnesium level was low at 1.4  Her LFTs were normal.  She had a pro-bnp of 186 and troponin of <0.015.  She has a tacrolimus level that is pending.  She had a cbc remarkable for normal wbc of 7.2, hemoglobin normal at 13.7, and platelets normal at 772948.     Labs from 11/18/20: CMV PCR negative with negative IgG and IgM,   11/18/20: EBV positive at 378255, log 5.0 (down from prior 9/2/20 with PCR [id number], log 5.4, peak value was 7/2/20 with PCR [id number] Log 5.7) EBNA IgG weakly positive for first time, Capsid IgG positive and IgM negative.       CT from July 2020 showed:  1. Postoperative changes of cardiac transplantation with stable caliber change at the aortic anastomosis.  2. Stable migrated aortic stent at the level of the celiac artery without associated thrombus.  3. Stable emphysematous changes in the left lower lobe.    Proximal arch: 2.3 x 2.2 cm  Proximal arch at anastomotic site: 1.6 x 1.3 cm, grossly stable  Mid arch: 1.5 x 1.7 cm   Isthmus: 1.7 x 1.5 cm, previously 1.3 x 1.3 cm  Proximal descending aorta: 2.7 x 2.8 cm  Distal descending aorta: 1.9 x 1.9 cm    Her PRA are as follows:  7/2/2020: no donor specific antibodies  12/26/2019: no donor specific antibodies  8/12/2019: donor specific antibodies to DR8 ()  3/4/19: donor specific antibodies to DR8 (MFI  569)  8/16/18: donor specific antibodies to DR8 (MFI 1187)  3/5/18: donor specific antibodies to DR 8 ()  8/24/17: no donor specific antibodies  3/6/17: donor specific antibodies to DR4 (MFI 3707)  8/3/16: donor specific antibodies to DR4 (MFI 2756)  8/10/15: donor specific antibodies to DR4 (MFI 2785), DPB1*04:01 (MFI 1478)  2/12/15: donor specific antibodies to: DR4 wxm=7210, DPB1*04:01 heb=750   8/18/14:  donor specific antibodies to DR4 with MFI 2325 (down from 2/13/14 MFI of 3184), DPB1*04:01 MFI 1537 and B44 QQT547.     Her last biopsy was 7/11/11 and showed negative C3d/C4d staining, no evidence of rejection grade 0.      Laurel is now 17 years out from orthotopic heart transplant, which was performed on February 17, 2004.  She also underwent subaortic membrane resection on November 12, 2008.  She has had no recurrence of her subaortic membrane. She also underwent coarctation stent placement on 8/14/2012, and unfortunately on followup on 11/5/12 was found to have stent migration to abdominal aorta, although not thought to be causing any obstruction to vessels by CT angiography.  She also was diagnosed with severe colitis, likely secondary to cellcept, that has resolved now off cellcept therapy, however coincidentally she was  converted to rapamune just prior to that event which may have been contributory as well.  We have continued to maintain her for now off cellcept and rapamune, back on tacrolimus.      Overall she is doing well from a cardiac standpoint at this time with no signs of rejection, with stable mild narrowing in her distal aortic arch.     Recommendations today:  1. Follow Up appt: 6 months with labs, CXR (does not need to be scheduled) and office visit to include ECHO and EKG - For annual visits transplant  Yanely Manning will contact you. Yanely can be reached at [phone]  2. Every 3 month transplant labs due March 2021 but may need an EBV sooner depending on labs from  today.  3. Restart magnesium 400 mg daily. You could try a prenatal vitamin for better vitamin D supplementation as well as iron and other vitamins.   4. We recommend you and all family members receive the influenza (flu) vaccination   5. Transplant office will call you with immunosuppression lab results and virologies    Thank you for allowing us to see Laurel and participate in her care.  Please let us know if you have any questions.     Diagnosis summary:  1. 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 Original diagnosis: double inlet left ventricle, d-TGA                     1. S/p PA band and tricuspid valve repair (6/2000)                     2. Developed subaortic obstruction and underwent DKS, atrial septectomy, AV valve repair and BT shunt placement, postoperative ECMO (2/2001)                     3. Shunt revision and PA plasty with postoperative ECMO (8/2001)           B. Had ongoing moderate to severe AV valve regurgitation and was felt to be poor single ventricle                            candidate so was referred to transplant  2. 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 S/p resection (11/12/2008)  3. Aortic arch narrowing            A. S/p stent placement (8/14/2012), stent found on 11/5/12 to have migrated to abdominal aorta  4. 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 cellcept induced colitis (diagnosed 11/5/12), now resolved off cellcept.  6. Iron deficiency anemia (diagnosed 8/3/16, resolved but now new anemia again summer 2019)  7. EBV viremia (diagnosed December 2019) without evidence of PTLD      Sincerely,      Misty Linton M.D.,    in Pediatrics        CC  Patient Care Team:  Misty Abbasi MD as PCP - General (Pediatrics)  Amelie, Andrews Mcgrath, PhD LP (Neuropsychology)  Lynn Lee MD as Physician (Internal Medicine)  Mayra, Freida Head, WARD as Registered  Nurse (Pediatrics)  Yanely Manning MA as Medical Assistant (Transplant Surgery)  Ton Mcnally MD as Assigned PCP    Copy to patient  CARLITA BASURTO  [address] [facility] [address]

## 2021-01-05 ENCOUNTER — TELEPHONE (OUTPATIENT)
Dept: PEDIATRIC CARDIOLOGY | Facility: CLINIC | Age: 21
End: 2021-01-05

## 2021-01-05 DIAGNOSIS — B27.00 EPSTEIN-BARR VIRUS VIREMIA: Primary | ICD-10-CM

## 2021-01-05 LAB
CMV DNA SPEC NAA+PROBE-ACNC: NORMAL [IU]/ML
CMV DNA SPEC NAA+PROBE-LOG#: NORMAL {LOG_IU}/ML
EBV DNA # SPEC NAA+PROBE: ABNORMAL {COPIES}/ML
EBV DNA SPEC NAA+PROBE-LOG#: 5.5 {LOG_COPIES}/ML
SPECIMEN SOURCE: NORMAL

## 2021-01-05 NOTE — TELEPHONE ENCOUNTER
I left a message with Laurel on her properly identified voicemail regarding her tacrolimus level and virologies.    Tacrolimus level on 1/5: 6.2, which is stable from previous level of 5.0 on 11/18  Time of last Dose: 9 pm on 1/3 (12 hr, 15 min trough)  Presently taking Tacrolimus 2 mg BID    Goal tacrolimus level: 5-8    Any nausea/vommitting/diarrhea: none reported  Any change in diet (consumption of grapefruit or pomegranate): no   Any missed doses: no  Any change in medications since last level: no    Instructed Laurel to continue on her current dose of tacrolimus. Her CMV is negative but her EBV PCR is more elevated at 294k compared to last check. Her plasma EBV and PRA are in process so I will follow up with her later this week with these results.

## 2021-01-07 LAB
DIAGNOSTIC IMP SPEC-IMP: DETECTED
EBV DNA # SPEC NAA+PROBE: ABNORMAL CPY/ML
EBV DNA SPEC NAA+PROBE-LOG#: 3.8 LOG
INTERPRETATION ECG - MUSE: NORMAL
SPECIMEN SOURCE: ABNORMAL

## 2021-01-08 NOTE — PROGRESS NOTES
SUBJECTIVE/OBJECTIVE:                           Laurel Santoyo is a 20 year old female with a history of complex congenital heart disease s/p heart transplant 2/17/2004 coming in for routine heart transplant follow up and a follow up visit for Medication Therapy Management.      Chief Complaint: Follow up from visit on 8/12/2019. Heart transplant.    Allergies/ADRs: Reviewed in chart  Tobacco: She reports that she has never smoked. She has never used smokeless tobacco.    Alcohol: none  Caffeine: not assessed at today's visit  Activity: Active 20 year old  Past Medical History: Reviewed in chart    Medication Adherence/Access:  no issues reported, excellent adherence  Patient takes medications 2 time(s) per day (0900/2100).   The patient fills medications at [facility]: YES.     Heart Transplant:    Current immunosuppressants:  generic tacrolimus (TAC) 2 mg qAM & 2 mg qPM (goal 5-8).     Pt reports no current side effects. Of note Laurel was previously on mycophenolate and sirolimus but developed drug induced colitis and both agents were eventually stopped with resolution of symptoms. No determination was made as to which was the causative agent.    Last tacrolimus level: 1/4/2021 6.2    Estimated Creatinine Clearance: 108.9 mL/min (based on SCr of 0.77 mg/dL).  Pro BNP/troponin:1/4/2021 18/ <0.015  Statin prevention: On simvastatin 5 mg at bedtime. Laurel denies muscle pain.  Recent Labs   Lab Test 07/02/20  1008 03/04/19  0819 08/18/14  0837 08/18/14  0837 02/13/14  0830   CHOL 120 114   < > 154 137   HDL 53 51   < > 63 44*   LDL 56 53   < > 75 68   TRIG 54 52   < > 78 126   CHOLHDLRATIO  --   --   --  2.4 3.0    < > = values in this interval not displayed.     Thrombosis prevention: On aspirin 81 mg daily. No issues with bleeding/bruising.   CMV prophylaxis: Complete  PCP prophylaxis: Completed   Antifungal Prophylaxis: Completed  Current supplements for electrolyte replacement: none currently, stopped magnesium  "a while ago. Magnesium on 1/4 was 1.4.  Tx Coordinator: Freida Beck MD: [place], Using Med Action Plan/Med Card: No, Lab Frequency: q3 months  Recent Infections:  None  Recent Hospitalizations: None  Skin: uses sunscreen, annual dermatology visit advised  Dentist: every 6 months, no issues reported  Immunizations: annual flu shot has not received this year, Pneumovax 23:  4/24/17; Prevnar 13: Received PCV 7 series, PCV 13 3/4/19, TDaP: 7/9/2012; HPV series completed; Menactra: series completed; Bexsaro (meningococcal B): 3/4/19    Hypertension: Laurel is currently on lisinopril 10 mg daily (at night) for blood pressure and afterload reduction. She denies any side effects. She does not currently monitor BP at home. Last Echo today reported as normal.     BP Readings from Last 3 Encounters:   01/04/21 118/71   09/15/20 124/87   07/02/20 107/81        Supplements: Laurel is no longer taking a multivitamin. Last vitamin D level was done 1/4/2021 and was 38, she is on cholecalciferol 2000 units daily and continues on this with no reported issues.      Patient Education: Laurel is responsible for filling her meds taking medications and has excellent adherence, reporting no missed doses.     Vitals:   BP Readings from Last 1 Encounters:   01/04/21 118/71     Pulse Readings from Last 1 Encounters:   01/04/21 117     Wt Readings from Last 1 Encounters:   01/04/21 130 lb 8.2 oz (59.2 kg)     Ht Readings from Last 1 Encounters:   01/04/21 5' 4.65\" (1.642 m)     Estimated body mass index is 21.96 kg/m  as calculated from the following:    Height as of an earlier encounter on 1/4/21: 5' 4.65\" (1.642 m).    Weight as of an earlier encounter on 1/4/21: 130 lb 8.2 oz (59.2 kg).    Temp Readings from Last 1 Encounters:   09/15/20 98  F (36.7  C) (Oral)         ASSESSMENT:                             Current medications were reviewed today.     Medication Adherence: excellent, no issues identified.    Kidney " Transplant: Stable. Doing well on current immunosuppression regimen of tac only, last level within goal. Magnesium slightly low off supplementation, will restart today.     Hypertension: Blood pressures within goal today <120/80. No medication issues identified today.     Supplements: Vitamin D within goal >30 today, continue current supplement. No medication issues identified today.     Patient Education: No current issues    PLAN:                            1. Start Magnesium 400 mg daily     I spent 15 minutes with this patient today. All changes were made via verbal approval with .     Will follow up in 12 months with next transplant visit.    The patient was given a summary of these recommendations as an after visit summary with the providers AVS.     Brittany Barkley, PharmD, [facility]  Pediatric Medication Therapy Management Pharmacist-Solid Organ Transplant  Pager: [phone]     Medication Therapy Recommendations  Heart transplant, orthotopic, status (H)    Current Medication: Magnesium 400 MG TABS   Rationale: Untreated condition - Needs additional medication therapy - Indication   Recommendation: Start Medication - magnesium oxide 400 MG tablet - take 1 tablet daily   Status: Accepted per Provider

## 2021-01-11 NOTE — TELEPHONE ENCOUNTER
I called Laurel back with her EBV and PRA results. Her antibody panel shows no DSA. From a heart transplant perspective, Dr Linton thinks that she is doing quite well and wants to continue her current immune suppression and follow up plan.     Her EBV whole blood and plasma levels are higher than her last check. Laurel confirmed on the phone today (1/12/21) that she is feeling well and is not having fatigue, chills, fevers, changes to appetite, or any enlarged lymph nodes that she can palpate or notices in her throat. Per Dr Stevens, we will recheck EBV labs in 1 month (early Feb) and reassess the need for further imaging at that time. We are reassured that she feels well but are concerned about the labs. Laurel verbalized understanding.

## 2021-02-03 DIAGNOSIS — Z94.1 HEART TRANSPLANTED (H): ICD-10-CM

## 2021-02-03 RX ORDER — LISINOPRIL 10 MG/1
10 TABLET ORAL DAILY
Qty: 90 TABLET | Refills: 3 | Status: SHIPPED | OUTPATIENT
Start: 2021-02-03 | End: 2022-05-25

## 2021-03-01 ENCOUNTER — TELEPHONE (OUTPATIENT)
Dept: PEDIATRIC CARDIOLOGY | Facility: CLINIC | Age: 21
End: 2021-03-01

## 2021-03-01 DIAGNOSIS — Z94.1 HEART REPLACED BY TRANSPLANT (H): Primary | ICD-10-CM

## 2021-03-01 DIAGNOSIS — Z94.1 HEART TRANSPLANTED (H): ICD-10-CM

## 2021-03-01 NOTE — TELEPHONE ENCOUNTER
I called Laurel to remind her that her EBV levels are needed to monitor for PTLD and viremia. I am also entering her every 3 month labs so they can be done at the same time.

## 2021-03-05 RX ORDER — TACROLIMUS 0.5 MG/1
2 CAPSULE ORAL 2 TIMES DAILY
Qty: 720 CAPSULE | Refills: 3 | Status: SHIPPED | OUTPATIENT
Start: 2021-03-05 | End: 2021-06-25

## 2021-03-05 NOTE — TELEPHONE ENCOUNTER
Laurel called back. She thought that we were going to do her EBV every 3 months and misunderstood that we wanted it monthly. She will plan to get labs next week (3/8). We will get an EBV only now and then a full set of transplant labs in April since Laurel would like to stay with her visit in July this summer. She verbalized understanding of this plan.     I also talked to her about her medication refills. She is currently getting 3 months of tacrolimus at a time (720 pills). She takes 2 mg worth of 0.5 mg capsules with each dose which is 8 pills per day. She said that she does not forget to take it and usually orders a few days before she is out. I encouraged her to order at least 1 week in advance with shipping delays during COVID. I messaged back the pharmacist as well since they had concerns about this.     Freida Nunez  Pediatric Heart Transplant, Heart Failure, and VAD Coordinator    Office: [phone]  Pager: [phone], job code [phone]

## 2021-03-10 ENCOUNTER — IMMUNIZATION (OUTPATIENT)
Dept: NURSING | Facility: CLINIC | Age: 21
End: 2021-03-10
Payer: COMMERCIAL

## 2021-03-10 PROCEDURE — 91303 PR COVID VAC JANSSEN AD26 0.5ML: CPT

## 2021-03-10 PROCEDURE — 0031A PR COVID VAC JANSSEN AD26 0.5ML: CPT

## 2021-03-12 ENCOUNTER — APPOINTMENT (OUTPATIENT)
Dept: LAB | Facility: CLINIC | Age: 21
End: 2021-03-12
Attending: PEDIATRICS
Payer: COMMERCIAL

## 2021-03-12 DIAGNOSIS — Z94.1 HEART REPLACED BY TRANSPLANT (H): ICD-10-CM

## 2021-03-12 DIAGNOSIS — B27.00 EPSTEIN-BARR VIRUS VIREMIA: ICD-10-CM

## 2021-03-12 LAB
ALBUMIN SERPL-MCNC: 3.7 G/DL (ref 3.4–5)
ALP SERPL-CCNC: 55 U/L (ref 40–150)
ALT SERPL W P-5'-P-CCNC: 17 U/L (ref 0–50)
ANION GAP SERPL CALCULATED.3IONS-SCNC: 5 MMOL/L (ref 3–14)
AST SERPL W P-5'-P-CCNC: 13 U/L (ref 0–45)
BASOPHILS # BLD AUTO: 0.1 10E9/L (ref 0–0.2)
BASOPHILS NFR BLD AUTO: 1.1 %
BILIRUB SERPL-MCNC: 0.2 MG/DL (ref 0.2–1.3)
BUN SERPL-MCNC: 16 MG/DL (ref 7–30)
CALCIUM SERPL-MCNC: 9 MG/DL (ref 8.5–10.1)
CHLORIDE SERPL-SCNC: 106 MMOL/L (ref 94–109)
CK SERPL-CCNC: 50 U/L (ref 30–225)
CMV DNA SPEC NAA+PROBE-ACNC: NORMAL [IU]/ML
CMV DNA SPEC NAA+PROBE-LOG#: NORMAL {LOG_IU}/ML
CMV IGG SERPL QL IA: <0.2 AI (ref 0–0.8)
CMV IGM SERPL QL IA: <0.2 AI (ref 0–0.8)
CO2 SERPL-SCNC: 25 MMOL/L (ref 20–32)
CREAT SERPL-MCNC: 0.83 MG/DL (ref 0.52–1.04)
DIFFERENTIAL METHOD BLD: NORMAL
EBV NA IGG SER QL IA: 0.6 AI (ref 0–0.8)
EBV VCA IGG SER QL IA: >8 AI (ref 0–0.8)
EBV VCA IGM SER QL IA: <0.2 AI (ref 0–0.8)
EOSINOPHIL # BLD AUTO: 0.5 10E9/L (ref 0–0.7)
EOSINOPHIL NFR BLD AUTO: 10.5 %
ERYTHROCYTE [DISTWIDTH] IN BLOOD BY AUTOMATED COUNT: 13.5 % (ref 10–15)
GFR SERPL CREATININE-BSD FRML MDRD: >90 ML/MIN/{1.73_M2}
GLUCOSE SERPL-MCNC: 165 MG/DL (ref 70–99)
HCT VFR BLD AUTO: 42.9 % (ref 35–47)
HGB BLD-MCNC: 13.8 G/DL (ref 11.7–15.7)
IMM GRANULOCYTES # BLD: 0 10E9/L (ref 0–0.4)
IMM GRANULOCYTES NFR BLD: 0.2 %
LYMPHOCYTES # BLD AUTO: 1 10E9/L (ref 0.8–5.3)
LYMPHOCYTES NFR BLD AUTO: 21.3 %
MAGNESIUM SERPL-MCNC: 1.4 MG/DL (ref 1.6–2.3)
MCH RBC QN AUTO: 27.4 PG (ref 26.5–33)
MCHC RBC AUTO-ENTMCNC: 32.2 G/DL (ref 31.5–36.5)
MCV RBC AUTO: 85 FL (ref 78–100)
MONOCYTES # BLD AUTO: 0.7 10E9/L (ref 0–1.3)
MONOCYTES NFR BLD AUTO: 15 %
NEUTROPHILS # BLD AUTO: 2.5 10E9/L (ref 1.6–8.3)
NEUTROPHILS NFR BLD AUTO: 51.9 %
NRBC # BLD AUTO: 0 10*3/UL
NRBC BLD AUTO-RTO: 0 /100
NT-PROBNP SERPL-MCNC: 110 PG/ML (ref 0–125)
PHOSPHATE SERPL-MCNC: 3.3 MG/DL (ref 2.5–4.5)
PLATELET # BLD AUTO: 208 10E9/L (ref 150–450)
POTASSIUM SERPL-SCNC: 4.2 MMOL/L (ref 3.4–5.3)
PROT SERPL-MCNC: 7.9 G/DL (ref 6.8–8.8)
RBC # BLD AUTO: 5.03 10E12/L (ref 3.8–5.2)
SODIUM SERPL-SCNC: 136 MMOL/L (ref 133–144)
SPECIMEN SOURCE: NORMAL
TROPONIN I SERPL-MCNC: <0.015 UG/L (ref 0–0.04)
WBC # BLD AUTO: 4.7 10E9/L (ref 4–11)

## 2021-03-12 PROCEDURE — 80053 COMPREHEN METABOLIC PANEL: CPT | Performed by: PEDIATRICS

## 2021-03-12 PROCEDURE — 86645 CMV ANTIBODY IGM: CPT | Performed by: PEDIATRICS

## 2021-03-12 PROCEDURE — 82550 ASSAY OF CK (CPK): CPT | Performed by: PEDIATRICS

## 2021-03-12 PROCEDURE — 83735 ASSAY OF MAGNESIUM: CPT | Performed by: PEDIATRICS

## 2021-03-12 PROCEDURE — 36415 COLL VENOUS BLD VENIPUNCTURE: CPT | Performed by: PEDIATRICS

## 2021-03-12 PROCEDURE — 84100 ASSAY OF PHOSPHORUS: CPT | Performed by: PEDIATRICS

## 2021-03-12 PROCEDURE — 83880 ASSAY OF NATRIURETIC PEPTIDE: CPT | Performed by: PEDIATRICS

## 2021-03-12 PROCEDURE — 87799 DETECT AGENT NOS DNA QUANT: CPT | Performed by: PEDIATRICS

## 2021-03-12 PROCEDURE — 85025 COMPLETE CBC W/AUTO DIFF WBC: CPT | Performed by: PEDIATRICS

## 2021-03-12 PROCEDURE — 86664 EPSTEIN-BARR NUCLEAR ANTIGEN: CPT | Performed by: PEDIATRICS

## 2021-03-12 PROCEDURE — 86665 EPSTEIN-BARR CAPSID VCA: CPT | Performed by: PEDIATRICS

## 2021-03-12 PROCEDURE — 84484 ASSAY OF TROPONIN QUANT: CPT | Performed by: PEDIATRICS

## 2021-03-12 PROCEDURE — 87799 DETECT AGENT NOS DNA QUANT: CPT | Mod: 59 | Performed by: PEDIATRICS

## 2021-03-12 PROCEDURE — 86665 EPSTEIN-BARR CAPSID VCA: CPT | Mod: 59 | Performed by: PEDIATRICS

## 2021-03-12 PROCEDURE — 86644 CMV ANTIBODY: CPT | Performed by: PEDIATRICS

## 2021-03-15 LAB
EBV DNA # SPEC NAA+PROBE: ABNORMAL {COPIES}/ML
EBV DNA SPEC NAA+PROBE-LOG#: 5.7 {LOG_COPIES}/ML

## 2021-03-16 LAB
DIAGNOSTIC IMP SPEC-IMP: DETECTED
EBV DNA # SPEC NAA+PROBE: ABNORMAL CPY/ML
EBV DNA SPEC NAA+PROBE-LOG#: 3.8 LOG
SPECIMEN SOURCE: ABNORMAL

## 2021-03-23 ENCOUNTER — VIRTUAL VISIT (OUTPATIENT)
Dept: PEDIATRIC HEMATOLOGY/ONCOLOGY | Facility: CLINIC | Age: 21
End: 2021-03-23
Attending: PEDIATRICS
Payer: COMMERCIAL

## 2021-03-23 DIAGNOSIS — Z94.1 HEART REPLACED BY TRANSPLANT (H): ICD-10-CM

## 2021-03-23 DIAGNOSIS — B27.00 EBV (EPSTEIN-BARR VIRUS) VIREMIA: Primary | ICD-10-CM

## 2021-03-23 DIAGNOSIS — D84.9 IMMUNOSUPPRESSION (H): ICD-10-CM

## 2021-03-23 PROCEDURE — 99214 OFFICE O/P EST MOD 30 MIN: CPT | Mod: 95 | Performed by: PEDIATRICS

## 2021-03-23 NOTE — PROGRESS NOTES
[facility]  Pediatric Hematology/Oncology   EBV Viremia/PTLD Clinic Note    Video-Visit Details    Type of service:  Video Visit    Video Start Time (time video started): 1134    Video End Time (time video stopped): 1148    Originating Location (pt. Location): Home    Distant Location (provider location):  [facility]     Mode of Communication:  Video Conference via [employer]    Physician has received verbal consent for a Video Visit from the patient? Yes    Interval History:  Laurel Santoyo is a 20 year old young woman with a history of complex congenital heart disease s/p orthotopic heart transplant 2/17/04, aortic stenosis s/p multiple corrective procedures/stents and now persistent EBV viremia who I am seeing virtually in follow-up for ongoing evaluation of EBV DNAemia and monitoring for PTLD. Laurel provided the history.    Laurel has been well since her last visit. She continues to take online classes, but is no longer working at [employer] because she did not feel the work environment was safe (mask wearing was not being enforced for customers). She tells me she is looking forward to going back to [employer] in person in the fall. She recently received her COVID19 vaccine.     Today, she continues to deny unexplained fevers, ill symptoms such as runny nose, sore throat, or cough, abdominal pain, unintentional weight loss, GI upset, nausea, vomiting, or  unusual lumps or bumps. She does continue to endorse some fatigue from time to time that she relates to her underlying depression.    EBV/PTLD History:  Laurel was EBV seronegative post-transplant until 8/16/18 when she was first noted to have detectable EBV capsid IgM and remained capsid IgM positive through 12/26/19 when she was first noted to have detectable whole blood EBV PCR level of 138K. She did not seroconvert to EBV capsid IgG positive until 3/25/20. In the  weeks prior to her first detectable EBV PCR level, Laurel was diagnosed with pneumonia and otitis media. She was also noted at that time to have tonsillar exudates and moderate cervical lymphadenopathy on exam consistent with a mono-like syndrome. These symptoms have all subsequently resolved. Of note, as of 11/18/20, she has now also seroconverted to EBNA IgG positive.     Her whole blood EBV PCR level has remained over 100K persistently for the last year with a peak of 549K on 7/2/20. Additionally, her plasma EBV PCR has shown low, but quantifiable copies consistently since 9/2/20 suggesting some degree of lytic disease. Despite her persistent whole blood EBV PCR level and evidence of lytic virus, she has not had any other symptoms concerning for development of PTLD. She denies unexplained fevers, ill symptoms such as runny nose, sore throat, or cough, abdominal pain, unintentional weight loss, GI upset, nausea, vomiting, or  unusual lumps or bumps. She does have chronic fatigue that waxes and wanes which has been continuing to follow the usual pattern of worsening when she is more depressed.    Review of Systems:  Pertinent positives reported in the HPI. All other systems in a complete and comprehensive review of systems were negative.    Past Medical History:  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     s/p heart transplant 2/2004 7/12/2011     Past Surgical History:  Past Surgical History:   Procedure Laterality Date     ANGIOGRAPHY  8/14/2012    Procedure: ANGIOGRAPHY;;  Surgeon: Nick Arias MD;  Location:  OR     C TRANSPLANTATION OF HEART   2004     HEART CATH CHILD  2011    Procedure:HEART CATH CHILD; Right Left, Biopsy and Possible Coarct Stent; Surgeon:MELANIE HUTCHINSON; Location:[facility] OR     HEART CATH CHILD  2012    Procedure: HEART CATH CHILD;  Left Heart Cath, Coronary Angiogram with Possible Balloon Dilatation of Aorta;  Surgeon: Melanie Hutchinson MD;  Location: [facility] OR     INSERT PICC LINE  2012    Procedure: INSERT PICC LINE;  Picc Line Placement;  Surgeon: ANNA Bailey MD;  Location: [facility] OR     Family History:  Family History   Problem Relation Age of Onset     Other - See Comments Maternal Grandfather         mental health     Other - See Comments Mother 18        migraine headaches     Social History:  Social History     Social History Narrative    FAMILY INFORMATION     Date: 2007    Parent #1      Name: Shaneka Santoyo   Gender: Female   : 1965      Education: M.A.   Occupation:         Parent #2      Name: iMna VALENZUELA Natanael   Gender: Male   : 1969     Education: M.A.   Occupation:         Siblings:      Vince Santoyo                Gender: Male           : 1998    Denice Santoyo           Gender: Female      : 2002        Relationship Status of Parent(s):     Who does the child live with? Parents, brother, and sister    What language(s) is/are spoken at home? English            -- Laurel reports that she did not return to [place] this semester given the COVID pandemic, but instead has enrolled in online classes through a local community college. She plans to attend [place] [employer] in the  in person.     Allergies:   No Known Allergies    Medications:  Current Outpatient Medications   Medication     amoxicillin (AMOXIL) 500 MG capsule     aspirin EC 81 MG EC tablet     biotin 1000 MCG TABS tablet     lisinopril (ZESTRIL) 10 MG tablet     Magnesium 400 MG TABS     simvastatin (ZOCOR) 5 MG tablet     tacrolimus (GENERIC EQUIVALENT)  0.5 MG capsule     vitamin D3 (CHOLECALCIFEROL) 2000 units (50 mcg) tablet     No current facility-administered medications for this visit.      Physical Exam:  There were no vitals taken for this visit. due to this being a virtual visit    GENERAL APPEARANCE: healthy, alert and in no distress  EYES: Eyes grossly normal to inspection, conjunctivae and sclerae normal  NECK: no masses reported by patient upon palpation  RESP: breathing effortlessly  MS: no musculoskeletal defects are noted   NEURO: Normal tone, mentation intact and speech normal  PSYCH: mentation appears normal and affect normal/bright    Data:  The following tests were ordered and interpreted by me today:  -- Whole blood EBV PCR  -- Plasma EBV PCR  -- CBC with diff    **Whole blood EBV PCR trends since first positive on 12/26/19       **Plasma EBV PCR trends  -- 09/02/20: 2840  -- 01/04/21: 6510  -- 03/12/21: 6980    CTA chest/abdomen (7/2/20)  FINDINGS:    Chest:  The thyroid and could represent residual thymus, aortic branching pattern, heart size, pericardium, and esophagus are normal. The ascending aorta and main pulmonary artery are not dilated. No large central pulmonary embolism. No lymphadenopathy in the chest. The central tracheobronchial tree is patent with secretions/debris in the lower trachea and right mainstem bronchus. No pneumothorax or pleural effusion. No airspace consolidation. Stable emphysematous changes in the left lung most pronounced in the left lower lobe.     Abdomen:  The liver, gallbladder, spleen, adrenal glands, pancreas, and kidneys are unremarkable. No abnormally dilated loops of bowel. No intra-abdominal free air or free fluid. Unchanged prominent nonenlarged retroperitoneal lymph nodes.     LUPE ALAS MD    Assessment and Plan:  Laurel is a 20 year old young woman with a history of complex congenital heart disease s/p orthotopic heart transplant 2/17/04, aortic stenosis s/p multiple corrective procedures/stents  and now persistent EBV viremia in her whole blood and plasma who is being seen in follow-up for ongoing EBV DNAemia evaluation and monitoring for PTLD.     Laurel was previously EBV negative by serology and PCR until August 2018 when she was first noted to have detectable EBV capsid IgM. She then remained EBV PCR negative and asymptomatic until December 2019 when she was first noted to have detectable whole blood EBV PCR and experienced a mono-like illness. She has since recovered from those symptoms, but continues to have a persistently elevated whole blood EBV PCR levels ranging from 150-550K and more recently noted to have low, but quantifiable plasma EBV PCR level.    By history, she does not have any signs or symptoms concerning for PTLD. Additionally, her most recent imaging (CTA chest/abdomen) from 7/2/20 was reviewed and was also reassuring given there were no enlarged lymph nodes or splenomegaly noted in that study.    Given her status as a heart transplant patient on lifelong immunosuppression who recently experienced a primary EBV viral infection, she remains at risk for the development of PTLD. Recently, her whole blood PCR has climbed again and her plasma EBV PCR remains quantifiable. For these reasons, she continues to require close follow up to monitor for evolution to PTLD.    -- Continue to monitor whole blood EBV PCR every month and plasma EBV PCR every 1-2 months  -- I would like to see Laurel in person with Dr. Restrepo in coordination with her Cardiology appts this summer; advised Laurel to contact us or her cardiology care coordinator if she does develop any symptoms or physical signs concerning for PTLD such as unexplained fevers, abdominal pain, new lumps or bumps, unintentional weight loss, GI upset, or worsening fatigue.  -- If her whole blood EBV PCR or plasma PCR continues to rise or she develops concerning symptoms, then I will work with her cardiology team to arrange for imaging prior  to her next appt     Jd Stevens MD  Pediatric Hematology/Oncology  [facility]  Pager [phone]     Total time spent on the following services on the date of the encounter:  -- Referring or communicating with other healthcare professionals  -- Interpretation of labs  -- Counseling and educating the patient/family/caregiver  -- Documenting clinical information in the electronic health record  -- Care coordination  -- Total time spent: 30 minutes

## 2021-03-23 NOTE — LETTER
3/23/2021      RE: Laurel Santoyo  [address] [address]       [facility]  Pediatric Hematology/Oncology   EBV Viremia/PTLD Clinic Note    Video-Visit Details    Type of service:  Video Visit    Video Start Time (time video started): 1134    Video End Time (time video stopped): 1148    Originating Location (pt. Location): Home    Distant Location (provider location):  [facility]     Mode of Communication:  Video Conference via [employer]    Physician has received verbal consent for a Video Visit from the patient? Yes    Interval History:  Laurel Santoyo is a 20 year old young woman with a history of complex congenital heart disease s/p orthotopic heart transplant 2/17/04, aortic stenosis s/p multiple corrective procedures/stents and now persistent EBV viremia who I am seeing virtually in follow-up for ongoing evaluation of EBV DNAemia and monitoring for PTLD. Laurel provided the history.    Laurel has been well since her last visit. She continues to take online classes, but is no longer working at [employer] because she did not feel the work environment was safe (mask wearing was not being enforced for customers). She tells me she is looking forward to going back to [employer] in person in the fall. She recently received her COVID19 vaccine.     Today, she continues to deny unexplained fevers, ill symptoms such as runny nose, sore throat, or cough, abdominal pain, unintentional weight loss, GI upset, nausea, vomiting, or  unusual lumps or bumps. She does continue to endorse some fatigue from time to time that she relates to her underlying depression.    EBV/PTLD History:  Laurel was EBV seronegative post-transplant until 8/16/18 when she was first noted to have detectable EBV capsid IgM and remained capsid IgM positive through 12/26/19 when she was first noted to have detectable whole blood EBV PCR  level of 138K. She did not seroconvert to EBV capsid IgG positive until 3/25/20. In the weeks prior to her first detectable EBV PCR level, Laurel was diagnosed with pneumonia and otitis media. She was also noted at that time to have tonsillar exudates and moderate cervical lymphadenopathy on exam consistent with a mono-like syndrome. These symptoms have all subsequently resolved. Of note, as of 11/18/20, she has now also seroconverted to EBNA IgG positive.     Her whole blood EBV PCR level has remained over 100K persistently for the last year with a peak of 549K on 7/2/20. Additionally, her plasma EBV PCR has shown low, but quantifiable copies consistently since 9/2/20 suggesting some degree of lytic disease. Despite her persistent whole blood EBV PCR level and evidence of lytic virus, she has not had any other symptoms concerning for development of PTLD. She denies unexplained fevers, ill symptoms such as runny nose, sore throat, or cough, abdominal pain, unintentional weight loss, GI upset, nausea, vomiting, or  unusual lumps or bumps. She does have chronic fatigue that waxes and wanes which has been continuing to follow the usual pattern of worsening when she is more depressed.    Review of Systems:  Pertinent positives reported in the HPI. All other systems in a complete and comprehensive review of systems were negative.    Past Medical History:  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     s/p heart transplant 2/2004 7/12/2011     Past Surgical History:  Past Surgical History:   Procedure Laterality Date     ANGIOGRAPHY  8/14/2012    Procedure: ANGIOGRAPHY;;   Surgeon: Melanie Hutchinson MD;  [facility]     C TRANSPLANTATION OF HEART  2004     HEART CATH CHILD  2011    Procedure:HEART CATH CHILD; Right Left, Biopsy and Possible Coarct Stent; Surgeon:MELANIE HUTCHINSON; [facility] OR     HEART CATH CHILD  2012    Procedure: HEART CATH CHILD;  Left Heart Cath, Coronary Angiogram with Possible Balloon Dilatation of Aorta;  Surgeon: Melanie Hutchinson MD;  [facility]     INSERT PICC LINE  2012    Procedure: INSERT PICC LINE;  Picc Line Placement;  Surgeon: ANNA Bailey MD;  [facility]     Family History:  Family History   Problem Relation Age of Onset     Other - See Comments Maternal Grandfather         mental health     Other - See Comments Mother 18        migraine headaches     Social History:  Social History     Social History Narrative    FAMILY INFORMATION     Date: 2007    Parent #1      Name: Shaneka Santoyo   Gender: Female   : 1965      Education: M.A.   Occupation:         Parent #2      Name: Mina FRANCISCOEricka Natanael   Gender: Male   : 1969     Education: M.A.   Occupation:         Siblings:      Vince Santoyo                Gender: Male           : 1998    Denice Natanael           Gender: Female      : 2002        Relationship Status of Parent(s):     Who does the child live with? Parents, brother, and sister    What language(s) is/are spoken at home? English            -- Laurel reports that she did not return to [place] this semester given the COVID pandemic, but instead has enrolled in online classes through a local community college. She plans to attend [employer] in the fall in person.     Allergies:   No Known Allergies    Medications:  Current Outpatient Medications   Medication     amoxicillin (AMOXIL) 500 MG capsule     aspirin EC 81 MG EC tablet     biotin 1000 MCG TABS tablet     lisinopril (ZESTRIL) 10 MG tablet      Magnesium 400 MG TABS     simvastatin (ZOCOR) 5 MG tablet     tacrolimus (GENERIC EQUIVALENT) 0.5 MG capsule     vitamin D3 (CHOLECALCIFEROL) 2000 units (50 mcg) tablet     No current facility-administered medications for this visit.      Physical Exam:  There were no vitals taken for this visit. due to this being a virtual visit    GENERAL APPEARANCE: healthy, alert and in no distress  EYES: Eyes grossly normal to inspection, conjunctivae and sclerae normal  NECK: no masses reported by patient upon palpation  RESP: breathing effortlessly  MS: no musculoskeletal defects are noted   NEURO: Normal tone, mentation intact and speech normal  PSYCH: mentation appears normal and affect normal/bright    Data:  The following tests were ordered and interpreted by me today:  -- Whole blood EBV PCR  -- Plasma EBV PCR  -- CBC with diff    **Whole blood EBV PCR trends since first positive on 12/26/19       **Plasma EBV PCR trends  -- 09/02/20: 2840  -- 01/04/21: 6510  -- 03/12/21: 6980    CTA chest/abdomen (7/2/20)  FINDINGS:    Chest:  The thyroid and could represent residual thymus, aortic branching pattern, heart size, pericardium, and esophagus are normal. The ascending aorta and main pulmonary artery are not dilated. No large central pulmonary embolism. No lymphadenopathy in the chest. The central tracheobronchial tree is patent with secretions/debris in the lower trachea and right mainstem bronchus. No pneumothorax or pleural effusion. No airspace consolidation. Stable emphysematous changes in the left lung most pronounced in the left lower lobe.     Abdomen:  The liver, gallbladder, spleen, adrenal glands, pancreas, and kidneys are unremarkable. No abnormally dilated loops of bowel. No intra-abdominal free air or free fluid. Unchanged prominent nonenlarged retroperitoneal lymph nodes.     LUPE ALAS MD    Assessment and Plan:  Laurel is a 20 year old young woman with a history of complex congenital heart disease s/p  orthotopic heart transplant 2/17/04, aortic stenosis s/p multiple corrective procedures/stents and now persistent EBV viremia in her whole blood and plasma who is being seen in follow-up for ongoing EBV DNAemia evaluation and monitoring for PTLD.     Laurel was previously EBV negative by serology and PCR until August 2018 when she was first noted to have detectable EBV capsid IgM. She then remained EBV PCR negative and asymptomatic until December 2019 when she was first noted to have detectable whole blood EBV PCR and experienced a mono-like illness. She has since recovered from those symptoms, but continues to have a persistently elevated whole blood EBV PCR levels ranging from 150-550K and more recently noted to have low, but quantifiable plasma EBV PCR level.    By history, she does not have any signs or symptoms concerning for PTLD. Additionally, her most recent imaging (CTA chest/abdomen) from 7/2/20 was reviewed and was also reassuring given there were no enlarged lymph nodes or splenomegaly noted in that study.    Given her status as a heart transplant patient on lifelong immunosuppression who recently experienced a primary EBV viral infection, she remains at risk for the development of PTLD. Recently, her whole blood PCR has climbed again and her plasma EBV PCR remains quantifiable. For these reasons, she continues to require close follow up to monitor for evolution to PTLD.    -- Continue to monitor whole blood EBV PCR every month and plasma EBV PCR every 1-2 months  -- I would like to see Laurel in person with Dr. Restrepo in coordination with her Cardiology appts this summer; advised Laurel to contact us or her cardiology care coordinator if she does develop any symptoms or physical signs concerning for PTLD such as unexplained fevers, abdominal pain, new lumps or bumps, unintentional weight loss, GI upset, or worsening fatigue.  -- If her whole blood EBV PCR or plasma PCR continues to rise or she  develops concerning symptoms, then I will work with her cardiology team to arrange for imaging prior to her next appt     Jd Stevens MD  Pediatric Hematology/Oncology  [facility]  Pager [phone]     Total time spent on the following services on the date of the encounter:  -- Referring or communicating with other healthcare professionals  -- Interpretation of labs  -- Counseling and educating the patient/family/caregiver  -- Documenting clinical information in the electronic health record  -- Care coordination  -- Total time spent: 30 minutes      Jd Stevens MD

## 2021-03-24 ENCOUNTER — TELEPHONE (OUTPATIENT)
Dept: PEDIATRIC CARDIOLOGY | Facility: CLINIC | Age: 21
End: 2021-03-24

## 2021-03-24 NOTE — TELEPHONE ENCOUNTER
Laurel was called and notified of the temporary inactivation of the [facility] pediatric heart transplant program. She was provided options to keep their post-transplant care here or transfer to another center, and would like to discuss this with her parents prior to making a decision about her care. Laurel notified she will receive a certified letter with this information and were encouraged to call the transplant office with future questions or concerns.    Addendum: Ing called back and she and Laurel would like to be able to continue her care with Dr Linton.     Freida Nunez  Pediatric Heart Transplant, Heart Failure, and VAD Coordinator    Office: [phone]  Pager: [phone], job code [phone]

## 2021-05-13 ENCOUNTER — TELEPHONE (OUTPATIENT)
Dept: PEDIATRIC CARDIOLOGY | Facility: CLINIC | Age: 21
End: 2021-05-13

## 2021-05-13 DIAGNOSIS — Z94.1 HEART REPLACED BY TRANSPLANT (H): Primary | ICD-10-CM

## 2021-05-13 NOTE — Clinical Note
Reason for Follow Up:  Annual (off scheduled) Follow up date request:  Late June - July Timed Labs:  Yes Time of Meds: 9 AM List of imaging needed: Renal US, Dexa, ECHO, Xrays - spine, chest Orders In Epic:  Yes Cath Needed:  No Request Sent to Shay:  No Other requests/procedures/coordinating visits: Derm - maybe [facility] if she is the one who sees adults too?  Freida

## 2021-05-14 NOTE — TELEPHONE ENCOUNTER
I followed up with Laurel as she was due for a repeat EBV last month. She said that she can go in the next week or so. She had her COVID immunization in early March so we will get a PRA too. She also needs a tacrolimus level. Laurel was in agreement with this plan. I also sent messages to Yanely to schedule her July visit.     Freida Nunez  Pediatric Heart Transplant, Heart Failure, and VAD Coordinator    Office: [phone]  Pager: [phone], job code [phone]

## 2021-05-19 ENCOUNTER — RESULTS ONLY (OUTPATIENT)
Dept: OTHER | Facility: CLINIC | Age: 21
End: 2021-05-19

## 2021-05-19 DIAGNOSIS — Z94.1 HEART REPLACED BY TRANSPLANT (H): ICD-10-CM

## 2021-05-19 LAB
TACROLIMUS BLD-MCNC: 5.2 UG/L (ref 5–15)
TME LAST DOSE: NORMAL H

## 2021-05-19 PROCEDURE — 86665 EPSTEIN-BARR CAPSID VCA: CPT | Performed by: PEDIATRICS

## 2021-05-19 PROCEDURE — 86665 EPSTEIN-BARR CAPSID VCA: CPT | Mod: 59 | Performed by: PEDIATRICS

## 2021-05-19 PROCEDURE — 86664 EPSTEIN-BARR NUCLEAR ANTIGEN: CPT | Performed by: PEDIATRICS

## 2021-05-19 PROCEDURE — 80197 ASSAY OF TACROLIMUS: CPT | Performed by: PEDIATRICS

## 2021-05-19 PROCEDURE — 87799 DETECT AGENT NOS DNA QUANT: CPT | Performed by: PEDIATRICS

## 2021-05-19 PROCEDURE — 86833 HLA CLASS II HIGH DEFIN QUAL: CPT | Performed by: PEDIATRICS

## 2021-05-19 PROCEDURE — 36415 COLL VENOUS BLD VENIPUNCTURE: CPT | Performed by: PEDIATRICS

## 2021-05-19 PROCEDURE — 86832 HLA CLASS I HIGH DEFIN QUAL: CPT | Performed by: PEDIATRICS

## 2021-05-20 LAB
EBV DNA # SPEC NAA+PROBE: ABNORMAL {COPIES}/ML
EBV DNA SPEC NAA+PROBE-LOG#: 5.1 {LOG_COPIES}/ML
EBV NA IGG SER QL IA: 0.4 AI (ref 0–0.8)
EBV VCA IGG SER QL IA: >8 AI (ref 0–0.8)
EBV VCA IGM SER QL IA: <0.2 AI (ref 0–0.8)

## 2021-05-21 ENCOUNTER — TELEPHONE (OUTPATIENT)
Dept: PEDIATRIC CARDIOLOGY | Facility: CLINIC | Age: 21
End: 2021-05-21

## 2021-05-21 NOTE — TELEPHONE ENCOUNTER
I left a message with Laurel regarding her tacrolimus level and EBV results    Tacrolimus level on 5/19: 5.2, which is stable from previous level of 6.2 on 1/4  Trough: 12 hours  Presently taking Tacrolimus 2 mg BID    Goal tacrolimus level: 5-8    Instructed Laurel to continue her current tacrolimus dose. Her EBV is down as well. We would like to see her for labs and a visit with our team and Dr Zavala (EBV follow up) in 1 month. I asked her to call back.     Freida Nunez  Pediatric Heart Transplant, Heart Failure, and VAD Coordinator    Office: [phone]  Pager: [phone], job code [phone]

## 2021-05-25 NOTE — TELEPHONE ENCOUNTER
Laurel left me a message on Tuesday morning that June 21 works for a follow up visit. I called her back and left a message that we will schedule this appointment. Her PRA shows no donor specific antibodies after her COVID vaccine in March which is reassuring. We will repeat labs with her visit in June. I asked her to call back with any further questions.

## 2021-06-04 ENCOUNTER — TELEPHONE (OUTPATIENT)
Dept: PEDIATRIC CARDIOLOGY | Facility: CLINIC | Age: 21
End: 2021-06-04

## 2021-06-04 DIAGNOSIS — Z94.1 HEART REPLACED BY TRANSPLANT (H): Primary | ICD-10-CM

## 2021-06-07 NOTE — TELEPHONE ENCOUNTER
Call made to Laurel. She was informed that  they will be receiving a letter which explains that Dr. Linton's has made the decision to leave the [facility] and [facility]. Talking points were reviewed with Laurel.    She would like to transition care to [facility] adult transplant care   Laurel has an appointment scheduled on June 21 which will be moved to June 24 with Dr Linton and Dr Rao to this visit.    Laurel was notified that we will continue to provide all care for her at [facility] in the interim. They can contact the transplant office or on call coordinator at any time with questions or concerns.     Freida Nunez  Pediatric Heart Transplant, Heart Failure, and VAD Coordinator    Office: [phone]  Pager: [phone], job code [phone]

## 2021-06-21 ENCOUNTER — OFFICE VISIT (OUTPATIENT)
Dept: INFECTIOUS DISEASES | Facility: CLINIC | Age: 21
End: 2021-06-21
Attending: PEDIATRICS
Payer: COMMERCIAL

## 2021-06-21 VITALS
TEMPERATURE: 98.8 F | SYSTOLIC BLOOD PRESSURE: 126 MMHG | HEART RATE: 107 BPM | BODY MASS INDEX: 22.88 KG/M2 | DIASTOLIC BLOOD PRESSURE: 86 MMHG | WEIGHT: 137.35 LBS | HEIGHT: 65 IN

## 2021-06-21 DIAGNOSIS — Z94.1 HEART REPLACED BY TRANSPLANT (H): Primary | ICD-10-CM

## 2021-06-21 PROCEDURE — 99204 OFFICE O/P NEW MOD 45 MIN: CPT | Performed by: PEDIATRICS

## 2021-06-21 PROCEDURE — G0463 HOSPITAL OUTPT CLINIC VISIT: HCPCS | Mod: 25,27

## 2021-06-21 ASSESSMENT — MIFFLIN-ST. JEOR: SCORE: 1392

## 2021-06-21 NOTE — NURSING NOTE
"Chief Complaint   Patient presents with     New Patient     EBV     /86   Pulse 107   Temp 98.8  F (37.1  C)   Ht 5' 4.88\" (164.8 cm)   Wt 137 lb 5.6 oz (62.3 kg)   BMI 22.94 kg/m      Sondra Monroy LPN    "

## 2021-06-21 NOTE — PROGRESS NOTES
PEDIATRIC INFECTIOUS DISEASES  [facility]  [address], [address] [place] [address]  Office: [phone]  Fax: [phone]     Date: 2021     To: Misty Abbasi MD  [address] [place] [address]    Pt: Laurel aSntoyo  MR: [id number]  : 2000  FAIZA: 2021     Dear Dr. Abbasi     I had the pleasure of seeing Laurel Santoyo at the Pediatric Infectious Diseases Clinic at the [facility]. 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 She presents today to establish care for monitoring of her EBV infection. She has previously seen my colleague Dr. Jd Stevens from [facility]/onc (3/23/21). Laurel has taken charge of her care over the past few years and is now fully managing her medications and appointments.      Laurel was EBV seronegative post-transplant until 18 when she was first noted to have detectable EBV capsid IgM and remained capsid IgM positive through 19 when she was first noted to have detectable whole blood EBV PCR level of 138K. She did not seroconvert to EBV capsid IgG positive until 3/25/20. In the weeks prior to her first detectable EBV PCR level, Laurel was diagnosed with pneumonia and otitis media. She was also noted at that time to have tonsillar exudates and moderate cervical lymphadenopathy on exam consistent with a mono-like syndrome. These symptoms have all subsequently resolved. Of note, as of 20, she has now also seroconverted to EBNA IgG positive.     Her whole blood EBV PCR level has remained over 100K persistently for the last year with a peak of 549K on 20. Additionally, her plasma EBV PCR has shown low, but quantifiable copies consistently since 20 suggesting some degree of lytic viral replication. Despite her  persistent whole blood EBV PCR level and evidence of lytic replication, she has not had any other symptoms concerning for development of PTLD. She denies unexplained fevers, ill symptoms such as runny nose, sore throat, or cough, abdominal pain, unintentional weight loss, GI upset, nausea, vomiting, or  unusual lumps or bumps. She does have chronic fatigue that waxes and wanes which has been continuing to follow the usual pattern of worsening when she is more depressed.      Problem list:   Patient Active Problem List   Diagnosis     IMMUNIZATION HISTORY     **Need for SBE (subacute bacterial endocarditis) prophylaxis     Heart replaced by transplant (H)     Vaccine contraindicated due to immunosuppression - NO LIVE VACCINES/ **NO MMR, NO VARICELLA, NO LIVE INFLUENZA**        ROS: 10 point ROS neg other than the symptoms noted above in the HPI.     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     s/p heart transplant 2/2004 7/12/2011       Past Surgical History:   Procedure Laterality Date     ANGIOGRAPHY  8/14/2012    Procedure: ANGIOGRAPHY;;  Surgeon: Melanie Arias MD;  [facility]     C TRANSPLANTATION OF HEART  2/2004     HEART CATH CHILD  7/11/2011    Procedure:HEART CATH CHILD; Right Left, Biopsy and Possible Coarct Stent; Surgeon:MELANIE ARIAS; [facility]     HEART CATH CHILD  8/14/2012    Procedure: HEART CATH CHILD;  Left Heart Cath, Coronary Angiogram with Possible Balloon Dilatation of Aorta;  Surgeon: Melanie Arias MD;  [facility]     INSERT PICC LINE  11/7/2012    Procedure: INSERT PICC LINE;  Picc Line  Placement;  Surgeon: ANNA Bailey MD;  [facility]       Family History   Problem Relation Age of Onset     Other - See Comments Maternal Grandfather         mental health     Other - See Comments Mother 18        migraine headaches       Social History     Tobacco Use     Smoking status: Never Smoker     Smokeless tobacco: Never Used   Substance Use Topics     Alcohol use: No         Immunization:   Immunization History   Administered Date(s) Administered     COVID-19,PF,Dom 03/10/2021     Comvax (HIB/HepB) 2000, 07/02/2001     DTAP (<7y) 2000, 2000, 07/19/2005, 08/22/2007     HEPA 09/02/2014     HPV 08/30/2013, 10/17/2013, 09/02/2014     HepA-ped 2 Dose 04/25/2018     HepB 08/22/2007     Influenza (H1N1) 10/21/2009, 11/25/2009     Influenza (IIV3) PF 2000, 10/24/2002, 10/09/2003, 10/06/2004, 11/16/2006, 10/19/2007, 10/15/2008, 10/11/2010, 10/03/2011, 10/19/2012     Influenza Vaccine IM 18-49 Yrs, RIV3 10/08/2019     Influenza Vaccine IM > 6 months Valent IIV4 10/17/2013, 10/17/2014, 10/19/2015, 10/21/2016, 11/23/2018, 01/04/2021     Influenza vaccine ages 6-35 months 10/15/2009     MMR 10/11/2001, 07/19/2005     Meningococcal (Bexsero ) 03/04/2019     Meningococcal (Menactra ) 08/30/2013, 04/24/2017     Pneumo Conj 13-V (2010&after) 03/04/2019     Pneumococcal (PCV 7) 2000, 2000, 07/02/2001     Pneumococcal 23 valent 04/24/2017     Poliovirus, inactivated (IPV) 2000, 2000, 07/19/2005, 08/22/2007     Synagis 10/11/2001, 10/09/2003, 11/04/2003, 12/03/2003, 12/30/2003, 01/26/2004, 04/06/2004, 11/30/2004, 12/28/2004, 03/10/2005, 04/06/2005     TDAP Vaccine (Boostrix) 07/09/2012     Varicella 10/11/2001     Allergies:  No Known Allergies     Current Outpatient Medications   Medication Sig Dispense Refill     amoxicillin (AMOXIL) 500 MG capsule TAKE 4 CAPSULES BY MOUTH ONCE FOR 1 DOSE, 30-60 MINUTES PRIOR TO DENTAL CLEANING 4 capsule 0     aspirin EC 81 MG EC  tablet Take 1 tablet by mouth daily. 30 tablet 6     biotin 1000 MCG TABS tablet Take 2,000 mcg by mouth daily       lisinopril (ZESTRIL) 10 MG tablet Take 1 tablet (10 mg) by mouth daily 90 tablet 3     Magnesium 400 MG TABS Take 400 mg by mouth daily 30 tablet 3     simvastatin (ZOCOR) 5 MG tablet Take 1 tablet (5 mg) by mouth every evening 90 tablet 3     tacrolimus (GENERIC EQUIVALENT) 0.5 MG capsule Take 4 capsules (2 mg) by mouth 2 times daily Take 2 mg (4 capsules) twice daily 720 capsule 3     vitamin D3 (CHOLECALCIFEROL) 2000 units (50 mcg) tablet Take 1 tablet (2,000 Units) by mouth daily 90 tablet 3       Physical Exam   GENERAL: Active, alert, in no acute distress.  SKIN: Clear. No significant rash, abnormal pigmentation or lesions  HEAD: Normocephalic  EYES: Pupils equal, round, reactive, Extraocular muscles intact. Normal conjunctivae.  NOSE: Normal without discharge.  MOUTH/THROAT: Clear. No oral lesions. Teeth without obvious abnormalities.  NECK: Supple, no masses.  No thyromegaly.  LYMPH NODES: No adenopathy  LUNGS: Clear. No rales, rhonchi, wheezing or retractions  HEART: Regular rhythm. Normal S1/S2. No murmurs. Normal pulses.  ABDOMEN: Soft, non-tender, not distended, no masses or hepatosplenomegaly. Bowel sounds normal.   NEUROLOGIC: No focal findings. Cranial nerves grossly intact: DTR's normal. Normal gait, strength and tone  BACK: Spine is straight, no scoliosis.  EXTREMITIES: Full range of motion, no deformities       Lab:  No results found for any visits on 06/21/21.     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 Her primary EBV infection occurred in 2019. She continues to have low level lytic replication at least as recently as 3/12/21 when it was last checked. Her whole blood EBV DNA titers have been stable without a large interval increase (mid-log 5). She has not developed a durable  positive titer for EBNA to date. Overall I would characterize her EBV DNA as moderately elevated, predominantly latent, with full establishment of latency not yet achieved (which is not concerning at this time in the absence of concerning symptoms).       Plan:     1. Continue monitoring EBV DNA PCR every three months: for the next measurement in September I would be in favor of doing a plasma level.     2. Please include anti-EBNA in next lab draw.    3. Follow up with me in three months to review new labs and to conduct symptomatic screening for PTLD.    Follow up: I would like to see Laurel 3 months. If symptoms reoccur or any new issues arise I would be happy to see her earlier in clinic.     I have reviewed Laurel s past medical history, family history, social history, medications and allergies as documented in the medical record. There were no additional findings except as noted.    I spent a total of 45 minutes face-to-face with Laurel Santoyo during today s office visit.  Over 50% of this time was spent counseling the patient and/or coordinating care on the same day of her clinic appointment.    Sincerely,     Mark Restrepo MD, PhD  Division of Pediatric Infectious Diseases  [facility]  [facility]  Clinic Coordinator: Sondra Mejias: [phone]  [phone]  Fax: [phone]

## 2021-06-24 ENCOUNTER — RESULTS ONLY (OUTPATIENT)
Dept: OTHER | Facility: CLINIC | Age: 21
End: 2021-06-24

## 2021-06-24 ENCOUNTER — OFFICE VISIT (OUTPATIENT)
Dept: PEDIATRIC CARDIOLOGY | Facility: CLINIC | Age: 21
End: 2021-06-24
Attending: PEDIATRICS
Payer: COMMERCIAL

## 2021-06-24 ENCOUNTER — HOSPITAL ENCOUNTER (OUTPATIENT)
Dept: CARDIOLOGY | Facility: CLINIC | Age: 21
End: 2021-06-24
Attending: PEDIATRICS
Payer: COMMERCIAL

## 2021-06-24 ENCOUNTER — OFFICE VISIT (OUTPATIENT)
Dept: PHARMACY | Facility: CLINIC | Age: 21
End: 2021-06-24

## 2021-06-24 VITALS
SYSTOLIC BLOOD PRESSURE: 123 MMHG | OXYGEN SATURATION: 97 % | BODY MASS INDEX: 22.66 KG/M2 | WEIGHT: 136.02 LBS | DIASTOLIC BLOOD PRESSURE: 91 MMHG | TEMPERATURE: 98.8 F | RESPIRATION RATE: 20 BRPM | HEIGHT: 65 IN | HEART RATE: 116 BPM

## 2021-06-24 DIAGNOSIS — Z94.1 HEART TRANSPLANT, ORTHOTOPIC, STATUS (H): Primary | ICD-10-CM

## 2021-06-24 DIAGNOSIS — Z94.1 HEART REPLACED BY TRANSPLANT (H): ICD-10-CM

## 2021-06-24 DIAGNOSIS — E63.9 NUTRITIONAL DEFICIENCY: ICD-10-CM

## 2021-06-24 DIAGNOSIS — I15.8 OTHER SECONDARY HYPERTENSION: ICD-10-CM

## 2021-06-24 LAB
ALBUMIN SERPL-MCNC: 3.8 G/DL (ref 3.4–5)
ALP SERPL-CCNC: 59 U/L (ref 40–150)
ALT SERPL W P-5'-P-CCNC: 17 U/L (ref 0–50)
ANION GAP SERPL CALCULATED.3IONS-SCNC: 9 MMOL/L (ref 3–14)
AST SERPL W P-5'-P-CCNC: 11 U/L (ref 0–45)
BASOPHILS # BLD AUTO: 0.1 10E9/L (ref 0–0.2)
BASOPHILS NFR BLD AUTO: 0.9 %
BILIRUB SERPL-MCNC: 0.5 MG/DL (ref 0.2–1.3)
BUN SERPL-MCNC: 14 MG/DL (ref 7–30)
CALCIUM SERPL-MCNC: 8.9 MG/DL (ref 8.5–10.1)
CHLORIDE SERPL-SCNC: 107 MMOL/L (ref 94–109)
CK SERPL-CCNC: 50 U/L (ref 30–225)
CMV IGG SERPL QL IA: 0.4 AI (ref 0–0.8)
CMV IGM SERPL QL IA: <0.2 AI (ref 0–0.8)
CO2 SERPL-SCNC: 21 MMOL/L (ref 20–32)
CREAT SERPL-MCNC: 0.91 MG/DL (ref 0.52–1.04)
DEPRECATED CALCIDIOL+CALCIFEROL SERPL-MC: 41 UG/L (ref 20–75)
DIFFERENTIAL METHOD BLD: NORMAL
EBV NA IGG SER QL IA: 0.4 AI (ref 0–0.8)
EBV VCA IGG SER QL IA: 7.8 AI (ref 0–0.8)
EBV VCA IGM SER QL IA: <0.2 AI (ref 0–0.8)
EOSINOPHIL # BLD AUTO: 0.5 10E9/L (ref 0–0.7)
EOSINOPHIL NFR BLD AUTO: 7.7 %
ERYTHROCYTE [DISTWIDTH] IN BLOOD BY AUTOMATED COUNT: 12.5 % (ref 10–15)
GFR SERPL CREATININE-BSD FRML MDRD: 90 ML/MIN/{1.73_M2}
GLUCOSE SERPL-MCNC: 144 MG/DL (ref 70–99)
HCT VFR BLD AUTO: 41.5 % (ref 35–47)
HGB BLD-MCNC: 13.4 G/DL (ref 11.7–15.7)
IMM GRANULOCYTES # BLD: 0 10E9/L (ref 0–0.4)
IMM GRANULOCYTES NFR BLD: 0.3 %
LYMPHOCYTES # BLD AUTO: 1.1 10E9/L (ref 0.8–5.3)
LYMPHOCYTES NFR BLD AUTO: 17.2 %
MAGNESIUM SERPL-MCNC: 1.4 MG/DL (ref 1.6–2.3)
MCH RBC QN AUTO: 27 PG (ref 26.5–33)
MCHC RBC AUTO-ENTMCNC: 32.3 G/DL (ref 31.5–36.5)
MCV RBC AUTO: 84 FL (ref 78–100)
MONOCYTES # BLD AUTO: 0.5 10E9/L (ref 0–1.3)
MONOCYTES NFR BLD AUTO: 7.3 %
NEUTROPHILS # BLD AUTO: 4.4 10E9/L (ref 1.6–8.3)
NEUTROPHILS NFR BLD AUTO: 66.6 %
NRBC # BLD AUTO: 0 10*3/UL
NRBC BLD AUTO-RTO: 0 /100
NT-PROBNP SERPL-MCNC: 265 PG/ML (ref 0–125)
PHOSPHATE SERPL-MCNC: 3.1 MG/DL (ref 2.5–4.5)
PLATELET # BLD AUTO: 260 10E9/L (ref 150–450)
POTASSIUM SERPL-SCNC: 4.1 MMOL/L (ref 3.4–5.3)
PROT SERPL-MCNC: 8.2 G/DL (ref 6.8–8.8)
RBC # BLD AUTO: 4.97 10E12/L (ref 3.8–5.2)
SODIUM SERPL-SCNC: 137 MMOL/L (ref 133–144)
TACROLIMUS BLD-MCNC: 7.8 UG/L (ref 5–15)
TME LAST DOSE: NORMAL H
TROPONIN I SERPL-MCNC: <0.015 UG/L (ref 0–0.04)
WBC # BLD AUTO: 6.6 10E9/L (ref 4–11)

## 2021-06-24 PROCEDURE — 93306 TTE W/DOPPLER COMPLETE: CPT | Mod: 26 | Performed by: PEDIATRICS

## 2021-06-24 PROCEDURE — 84100 ASSAY OF PHOSPHORUS: CPT | Performed by: PEDIATRICS

## 2021-06-24 PROCEDURE — G0463 HOSPITAL OUTPT CLINIC VISIT: HCPCS | Mod: 25

## 2021-06-24 PROCEDURE — 99606 MTMS BY PHARM EST 15 MIN: CPT | Performed by: PHARMACIST

## 2021-06-24 PROCEDURE — 86644 CMV ANTIBODY: CPT | Performed by: PEDIATRICS

## 2021-06-24 PROCEDURE — 80053 COMPREHEN METABOLIC PANEL: CPT | Performed by: PEDIATRICS

## 2021-06-24 PROCEDURE — 85025 COMPLETE CBC W/AUTO DIFF WBC: CPT | Performed by: PEDIATRICS

## 2021-06-24 PROCEDURE — 86645 CMV ANTIBODY IGM: CPT | Performed by: PEDIATRICS

## 2021-06-24 PROCEDURE — 84484 ASSAY OF TROPONIN QUANT: CPT | Performed by: PEDIATRICS

## 2021-06-24 PROCEDURE — 82306 VITAMIN D 25 HYDROXY: CPT | Performed by: PEDIATRICS

## 2021-06-24 PROCEDURE — 83880 ASSAY OF NATRIURETIC PEPTIDE: CPT | Performed by: PEDIATRICS

## 2021-06-24 PROCEDURE — 86833 HLA CLASS II HIGH DEFIN QUAL: CPT | Performed by: PEDIATRICS

## 2021-06-24 PROCEDURE — 87799 DETECT AGENT NOS DNA QUANT: CPT | Performed by: PEDIATRICS

## 2021-06-24 PROCEDURE — 93005 ELECTROCARDIOGRAM TRACING: CPT

## 2021-06-24 PROCEDURE — 86664 EPSTEIN-BARR NUCLEAR ANTIGEN: CPT | Performed by: PEDIATRICS

## 2021-06-24 PROCEDURE — 80197 ASSAY OF TACROLIMUS: CPT | Performed by: PEDIATRICS

## 2021-06-24 PROCEDURE — 36415 COLL VENOUS BLD VENIPUNCTURE: CPT | Performed by: PEDIATRICS

## 2021-06-24 PROCEDURE — 86352 CELL FUNCTION ASSAY W/STIM: CPT | Performed by: PEDIATRICS

## 2021-06-24 PROCEDURE — 82550 ASSAY OF CK (CPK): CPT | Performed by: PEDIATRICS

## 2021-06-24 PROCEDURE — 99214 OFFICE O/P EST MOD 30 MIN: CPT | Performed by: PEDIATRICS

## 2021-06-24 PROCEDURE — 86665 EPSTEIN-BARR CAPSID VCA: CPT | Performed by: PEDIATRICS

## 2021-06-24 PROCEDURE — 93306 TTE W/DOPPLER COMPLETE: CPT

## 2021-06-24 PROCEDURE — 86769 SARS-COV-2 COVID-19 ANTIBODY: CPT | Performed by: PEDIATRICS

## 2021-06-24 PROCEDURE — 86832 HLA CLASS I HIGH DEFIN QUAL: CPT | Performed by: PEDIATRICS

## 2021-06-24 PROCEDURE — 83735 ASSAY OF MAGNESIUM: CPT | Performed by: PEDIATRICS

## 2021-06-24 RX ORDER — ROSUVASTATIN CALCIUM 10 MG/1
10 TABLET, COATED ORAL DAILY
Qty: 90 TABLET | Refills: 3 | Status: SHIPPED | OUTPATIENT
Start: 2021-06-24 | End: 2022-07-27

## 2021-06-24 RX ORDER — CALCIUM CARBONATE 300MG(750)
400 TABLET,CHEWABLE ORAL DAILY
Qty: 30 TABLET | Refills: 3 | Status: SHIPPED | OUTPATIENT
Start: 2021-06-24

## 2021-06-24 ASSESSMENT — MIFFLIN-ST. JEOR: SCORE: 1383.49

## 2021-06-24 ASSESSMENT — PAIN SCALES - GENERAL: PAINLEVEL: NO PAIN (0)

## 2021-06-24 NOTE — LETTER
2021      RE: Laurel Santoyo  [address] [facility] [address]       Misty Abbasi MD   [facility]   [facility] [place] [address]    RE: Laurel Santoyo  MRN: [id number]  : 2000     Dear Dr. Abbasi:     We had the pleasure of seeing your patient, Laurel Santoyo, in the Pediatric Heart Transplant Clinic at the [facility] on 2021 for her routine post-transplant followup now 17 1/2 years after transplant.  As you know, she is now a 20 year old with a history of complex congenital heart disease s/p multiple palliative surgeries, who underwent orthotopic heart transplant on 2004.      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  She underwent heart cath in 2011 for concern over aortic arch narrowing by echo, and heart cath was complicated by episode of complete heart block requiring cpr with spontaneous recovery of rhythm.  She was found to have both ascending aortic narrowing at anastomotic site as well as mild descending aortic narrowing.  She underwent stent placement in her coarctation on 2012, at which time she was also found to have mild coronary vasculopathy.  She was started on sirolimus (rapamune) on 12 because of the coronary findings, and once her level was therapeutic her tacrolimus level was discontinued.  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  She recovered well after stopping these medications and treating with tpn and antibiotics, and was discharged on 12.      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     Today she is in clinic for routine followup. She reports no concerns on a cardiac or respiratory basis.  She is feeling well, good energy, active.She did online classes at [employer] and is planning to transition to in person classes at [employer] this fall. She is working this summer. She continues to have some issue with headaches, anxiety/depression and is working with a therapist but is now off antidepressants. She has not had any fever, abdominal cramps/pain, diarrhea.  Comprehensive review of systems is otherwise negative today.  There have been no changes to the past medical, family, or social history other than as noted above.     Current medications include:   Prescription Medications as of 6/24/2021       Rx Number Disp Refills Start End Last Dispensed Date Next Fill Date Owning Pharmacy    amoxicillin (AMOXIL) 500 MG capsule  4 capsule 0 6/15/2020    [id number] - [place], [place] - [facility] AT [facility] [address]    Sig: TAKE 4 CAPSULES BY MOUTH ONCE FOR 1 DOSE, 30-60 MINUTES PRIOR TO DENTAL CLEANING    Class: E-Prescribe    aspirin EC 81 MG EC tablet  30 tablet 6 7/12/2011    [facility]    Sig: Take 1 tablet by mouth daily.    Class: OTC    Route: Oral    biotin 1000 MCG TABS tablet            Sig: Take 2,000 mcg by mouth daily    Class: Historical    Route: Oral    lisinopril (ZESTRIL) 10 MG tablet  90 tablet 3 2/3/2021    [facility] [place] [address] [address]    Sig: Take 1 tablet (10 mg) by mouth daily    Class: E-Prescribe    Route: Oral    Magnesium 400 MG TABS  30 tablet 3 1/4/2021    [facility] - [place] [address]    Sig: Take 400 mg by mouth daily    Class: E-Prescribe    Route: Oral    simvastatin (ZOCOR) 5 MG tablet  90 tablet 3 8/18/2020    [facility] -  "[place] [address]    Sig: Take 1 tablet (5 mg) by mouth every evening    Class: E-Prescribe    Route: Oral    tacrolimus (GENERIC EQUIVALENT) 0.5 MG capsule  720 capsule 3 3/5/2021    [facility] - [place] [address] [address]     Sig: Take 4 capsules (2 mg) by mouth 2 times daily Take 2 mg (4 capsules) twice daily    Class: E-Prescribe    Route: Oral    vitamin D3 (CHOLECALCIFEROL) 2000 units (50 mcg) tablet  90 tablet 3 4/6/2020    [facility] [place] [address] [address]     Sig: Take 1 tablet (2,000 Units) by mouth daily    Class: E-Prescribe    Route: Oral        On exam today, she is awake, alert, in no acute distress. Vital signs include: BP (!) 123/91 (BP Location: Right arm, Patient Position: Sitting, Cuff Size: Adult Regular)   Pulse 116   Temp 98.8  F (37.1  C) (Oral)   Resp 20   Ht 1.644 m (5' 4.72\")   Wt 61.7 kg (136 lb 0.4 oz)   SpO2 97%   BMI 22.83 kg/m    Her lungs are clear to auscultation bilaterally with no wheezes, rales or rhonchi.  Cardiovascular exam is notable for a regular rate and rhythm with a normal S1 and normal physiologically splitting S2.  There is a grade 2/6 systolic ejection murmur at the left upper sternal border and left upper back.  There are no rubs or gallops on exam today.  Abdomen is soft, nontender..  Extremities are warm and well perfused with no peripheral clubbing, edema, or cyanosis.  There is very subtle radial femoral pulse delay with 1+ femoral pulses.  The exam is otherwise unremarkable.       Laurel had evaluation today including labs, imaging, echocardiogram, ecg.   Her echocardiogram showed:      Her labs today:  She had a comprehensive metabolic panel which was normal, specifically the bun was 14 and creatinine of 0.91.  Her magnesium level was low at 1.4  Her LFTs were normal.  She had a pro-bnp of 265 and troponin of <0.015.  She has a tacrolimus level that is pending.  She had a " cbc remarkable for normal wbc of 6.6, hemoglobin normal at 13.4, and platelets normal at 862462.     Labs from 3/12/21: CMV PCR negative with negative IgG and IgM,   5/19/21: EBV positive at 463014, log 5.1. EBNA IgG negative, Capsid IgG positive and IgM negative.       CT from July 2020 showed:  1. Postoperative changes of cardiac transplantation with stable caliber change at the aortic anastomosis.  2. Stable migrated aortic stent at the level of the celiac artery without associated thrombus.  3. Stable emphysematous changes in the left lower lobe.    Proximal arch: 2.3 x 2.2 cm  Proximal arch at anastomotic site: 1.6 x 1.3 cm, grossly stable  Mid arch: 1.5 x 1.7 cm   Isthmus: 1.7 x 1.5 cm, previously 1.3 x 1.3 cm  Proximal descending aorta: 2.7 x 2.8 cm  Distal descending aorta: 1.9 x 1.9 cm    Her PRA are as follows:  5/19/21: no donor specific antibodies  7/2/2020: no donor specific antibodies  12/26/2019: no donor specific antibodies  8/12/2019: donor specific antibodies to DR8 ()  3/4/19: donor specific antibodies to DR8 ()  8/16/18: donor specific antibodies to DR8 (MFI 1187)  3/5/18: donor specific antibodies to DR 8 ()  8/24/17: no donor specific antibodies  3/6/17: donor specific antibodies to DR4 (MFI 3707)  8/3/16: donor specific antibodies to DR4 (MFI 2756)  8/10/15: donor specific antibodies to DR4 (MFI 2785), DPB1*04:01 (MFI 1478)  2/12/15: donor specific antibodies to: DR4 mdy=8742, DPB1*04:01 adz=379   8/18/14:  donor specific antibodies to DR4 with MFI 2325 (down from 2/13/14 MFI of 3184), DPB1*04:01 MFI 1537 and B44 [id number].     Her last biopsy was 7/11/11 and showed negative C3d/C4d staining, no evidence of rejection grade 0.      Laurel is now 17 1/2 years out from orthotopic heart transplant, which was performed on February 17, 2004.  She also underwent subaortic membrane resection on November 12, 2008.  She has had no recurrence of her subaortic membrane. She also  underwent coarctation stent placement on 8/14/2012, and unfortunately on followup on 11/5/12 was found to have stent migration to abdominal aorta, although not thought to be causing any obstruction to vessels by CT angiography.  She also was diagnosed with severe colitis, likely secondary to cellcept, that has resolved now off cellcept therapy, however coincidentally she was  converted to rapamune just prior to that event which may have been contributory as well.  We have continued to maintain her for now off cellcept and rapamune, back on tacrolimus.      Overall she is doing well from a cardiac standpoint at this time with no signs of rejection, with stable mild narrowing in her distal aortic arch.     Recommendations today:  1. Follow Up appt: 6 months with labs, stress cardiac MRI, and office visit with Dr Rao to include ECHO and EKG    2. Every 3 month transplant labs due September 2021 with EBV labs  3. Medication changes:  Switch statin from simvistatin to crestor 10 mg  Magnesium 400 mg daily or a prenatal vitamin  4. Get a BP cuff and check 2-3 days per week. Also start to use a pill box per conversation with Brittany.   5. Transplant office will call you with immunosuppression lab results   6. We did discuss that Dr. Linton is leaving the [facility]. Given this, we had Laurel meet Dr. Rao today so she can transition to the adult transplant team. She will continue to contact the pediatric heart transplant coordinators until first visit on adult side.     Thank you for allowing us to see Laurel and participate in her care.  Please let us know if you have any questions.     Diagnosis summary:  1. 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 Original diagnosis: double inlet left ventricle, d-TGA                     1. S/p PA band and tricuspid valve repair (6/2000)                     2. Developed subaortic obstruction and underwent DKS, atrial  septectomy, AV valve repair and BT shunt placement, postoperative ECMO (2/2001)                     3. Shunt revision and PA plasty with postoperative ECMO (8/2001)           B. Had ongoing moderate to severe AV valve regurgitation and was felt to be poor single ventricle                            candidate so was referred to transplant  2. 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 S/p resection (11/12/2008)  3. Aortic arch narrowing            A. S/p stent placement (8/14/2012), stent found on 11/5/12 to have migrated to abdominal aorta  4. 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 cellcept induced colitis (diagnosed 11/5/12), now resolved off cellcept.  6. Iron deficiency anemia (diagnosed 8/3/16, resolved but now new anemia again summer 2019)  7. 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

## 2021-06-24 NOTE — PROGRESS NOTES
Misty Abbasi MD   [facility]   [address] [place] [id number]    RE: Laurel Santoyo  MRN: [id number]  : 2000     Dear Dr. Abbasi:     We had the pleasure of seeing your patient, Laurel Santoyo, in the Pediatric Heart Transplant Clinic at the [facility] on 2021 for her routine post-transplant followup now 17 1/2 years after transplant.  As you know, she is now a 20 year old with a history of complex congenital heart disease s/p multiple palliative surgeries, who underwent orthotopic heart transplant on 2004.      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  She underwent heart cath in 2011 for concern over aortic arch narrowing by echo, and heart cath was complicated by episode of complete heart block requiring cpr with spontaneous recovery of rhythm.  She was found to have both ascending aortic narrowing at anastomotic site as well as mild descending aortic narrowing.  She underwent stent placement in her coarctation on 2012, at which time she was also found to have mild coronary vasculopathy.  She was started on sirolimus (rapamune) on 12 because of the coronary findings, and once her level was therapeutic her tacrolimus level was discontinued.  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  She recovered well after stopping these medications and treating with tpn and antibiotics, and was discharged on 12.      She was diagnosed with EBV viremia in 2019, and has had ongoing EBV Viremia that we are following every 3 months.     Today she is  in clinic for routine followup. She reports no concerns on a cardiac or respiratory basis.  She is feeling well, good energy, active.She did online classes at [email or web address] and is planning to transition to in person classes at [employer] this fall. She is working this summer. She continues to have some issue with headaches, anxiety/depression and is working with a therapist but is now off antidepressants. She has not had any fever, abdominal cramps/pain, diarrhea.  Comprehensive review of systems is otherwise negative today.  There have been no changes to the past medical, family, or social history other than as noted above.     Current medications include:   Prescription Medications as of 6/24/2021       Rx Number Disp Refills Start End Last Dispensed Date Next Fill Date Owning Pharmacy    amoxicillin (AMOXIL) 500 MG capsule  4 capsule 0 6/15/2020    [facility][id number] - [place], [place] - [address] AT [facility] [address]    Sig: TAKE 4 CAPSULES BY MOUTH ONCE FOR 1 DOSE, 30-60 MINUTES PRIOR TO DENTAL CLEANING    Class: E-Prescribe    aspirin EC 81 MG EC tablet  30 tablet 6 7/12/2011    [facility]    Sig: Take 1 tablet by mouth daily.    Class: OTC    Route: Oral    biotin 1000 MCG TABS tablet            Sig: Take 2,000 mcg by mouth daily    Class: Historical    Route: Oral    lisinopril (ZESTRIL) 10 MG tablet  90 tablet 3 2/3/2021    [facility] [place] [address] [address]    Sig: Take 1 tablet (10 mg) by mouth daily    Class: E-Prescribe    Route: Oral    Magnesium 400 MG TABS  30 tablet 3 1/4/2021    [facility] [place] [address] [address]    Sig: Take 400 mg by mouth daily    Class: E-Prescribe    Route: Oral    simvastatin (ZOCOR) 5 MG tablet  90 tablet 3 8/18/2020    [facility] [place] [address] [address]    Sig: Take 1 tablet (5 mg) by mouth every evening    Class:  "E-Prescribe    Route: Oral    tacrolimus (GENERIC EQUIVALENT) 0.5 MG capsule  720 capsule 3 3/5/2021    [facility] [place] [address] [address]    Sig: Take 4 capsules (2 mg) by mouth 2 times daily Take 2 mg (4 capsules) twice daily    Class: E-Prescribe    Route: Oral    vitamin D3 (CHOLECALCIFEROL) 2000 units (50 mcg) tablet  90 tablet 3 4/6/2020    [facility] [place] [address]    Sig: Take 1 tablet (2,000 Units) by mouth daily    Class: E-Prescribe    Route: Oral        On exam today, she is awake, alert, in no acute distress. Vital signs include: BP (!) 123/91 (BP Location: Right arm, Patient Position: Sitting, Cuff Size: Adult Regular)   Pulse 116   Temp 98.8  F (37.1  C) (Oral)   Resp 20   Ht 1.644 m (5' 4.72\")   Wt 61.7 kg (136 lb 0.4 oz)   SpO2 97%   BMI 22.83 kg/m    Her lungs are clear to auscultation bilaterally with no wheezes, rales or rhonchi.  Cardiovascular exam is notable for a regular rate and rhythm with a normal S1 and normal physiologically splitting S2.  There is a grade 2/6 systolic ejection murmur at the left upper sternal border and left upper back.  There are no rubs or gallops on exam today.  Abdomen is soft, nontender..  Extremities are warm and well perfused with no peripheral clubbing, edema, or cyanosis.  There is very subtle radial femoral pulse delay with 1+ femoral pulses.  The exam is otherwise unremarkable.       Laurel had evaluation today including labs, imaging, echocardiogram, ecg.   Her echocardiogram showed:      Her labs today:  She had a comprehensive metabolic panel which was normal, specifically the bun was 14 and creatinine of 0.91.  Her magnesium level was low at 1.4  Her LFTs were normal.  She had a pro-bnp of 265 and troponin of <0.015.  She has a tacrolimus level that is pending.  She had a cbc remarkable for normal wbc of 6.6, hemoglobin normal at 13.4, and platelets normal at 231467. "     Labs from 3/12/21: CMV PCR negative with negative IgG and IgM,   5/19/21: EBV positive at 734771, log 5.1. EBNA IgG negative, Capsid IgG positive and IgM negative.       CT from July 2020 showed:  1. Postoperative changes of cardiac transplantation with stable caliber change at the aortic anastomosis.  2. Stable migrated aortic stent at the level of the celiac artery without associated thrombus.  3. Stable emphysematous changes in the left lower lobe.    Proximal arch: 2.3 x 2.2 cm  Proximal arch at anastomotic site: 1.6 x 1.3 cm, grossly stable  Mid arch: 1.5 x 1.7 cm   Isthmus: 1.7 x 1.5 cm, previously 1.3 x 1.3 cm  Proximal descending aorta: 2.7 x 2.8 cm  Distal descending aorta: 1.9 x 1.9 cm    Her PRA are as follows:  5/19/21: no donor specific antibodies  7/2/2020: no donor specific antibodies  12/26/2019: no donor specific antibodies  8/12/2019: donor specific antibodies to DR8 ()  3/4/19: donor specific antibodies to DR8 ()  8/16/18: donor specific antibodies to DR8 (MFI 1187)  3/5/18: donor specific antibodies to DR 8 ()  8/24/17: no donor specific antibodies  3/6/17: donor specific antibodies to DR4 (MFI 3707)  8/3/16: donor specific antibodies to DR4 (MFI 2756)  8/10/15: donor specific antibodies to DR4 (MFI 2785), DPB1*04:01 (MFI 1478)  2/12/15: donor specific antibodies to: DR4 vxz=6324, DPB1*04:01 wfv=855   8/18/14:  donor specific antibodies to DR4 with MFI 2325 (down from 2/13/14 MFI of 3184), DPB1*04:01 MFI 1537 and B44 [id number].     Her last biopsy was 7/11/11 and showed negative C3d/C4d staining, no evidence of rejection grade 0.      Laurel is now 17 1/2 years out from orthotopic heart transplant, which was performed on February 17, 2004.  She also underwent subaortic membrane resection on November 12, 2008.  She has had no recurrence of her subaortic membrane. She also underwent coarctation stent placement on 8/14/2012, and unfortunately on followup on 11/5/12 was found to  have stent migration to abdominal aorta, although not thought to be causing any obstruction to vessels by CT angiography.  She also was diagnosed with severe colitis, likely secondary to cellcept, that has resolved now off cellcept therapy, however coincidentally she was  converted to rapamune just prior to that event which may have been contributory as well.  We have continued to maintain her for now off cellcept and rapamune, back on tacrolimus.      Overall she is doing well from a cardiac standpoint at this time with no signs of rejection, with stable mild narrowing in her distal aortic arch.     Recommendations today:  1. Follow Up appt: 6 months with labs, stress cardiac MRI, and office visit with Dr Rao to include ECHO and EKG    2. Every 3 month transplant labs due September 2021 with EBV labs  3. Medication changes:  Switch statin from simvistatin to crestor 10 mg  Magnesium 400 mg daily or a prenatal vitamin  4. Get a BP cuff and check 2-3 days per week. Also start to use a pill box per conversation with Brittany.   5. Transplant office will call you with immunosuppression lab results   6. We did discuss that Dr. Linton is leaving the [facility]. Given this, we had Laurel meet Dr. Rao today so she can transition to the adult transplant team. She will continue to contact the pediatric heart transplant coordinators until first visit on adult side.     Thank you for allowing us to see Laurel and participate in her care.  Please let us know if you have any questions.     Diagnosis summary:  1. 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 Original diagnosis: double inlet left ventricle, d-TGA                     1. S/p PA band and tricuspid valve repair (6/2000)                     2. Developed subaortic obstruction and underwent DKS, atrial septectomy, AV valve repair and BT shunt placement, postoperative ECMO (2/2001)                     3. Shunt  revision and PA plasty with postoperative ECMO (8/2001)           B. Had ongoing moderate to severe AV valve regurgitation and was felt to be poor single ventricle                            candidate so was referred to transplant  2. 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 S/p resection (11/12/2008)  3. Aortic arch narrowing            A. S/p stent placement (8/14/2012), stent found on 11/5/12 to have migrated to abdominal aorta  4. 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 cellcept induced colitis (diagnosed 11/5/12), now resolved off cellcept.  6. Iron deficiency anemia (diagnosed 8/3/16, resolved but now new anemia again summer 2019)  7. EBV viremia (diagnosed December 2019) without evidence of PTLD      Sincerely,      Misty Linton M.D.,    in Pediatrics        CC  Patient Care Team:  Misty Abbasi MD as PCP - General (Pediatrics)  Misty Linton MD as MD (Pediatric Cardiology)  Misty Abbasi MD as Referring Physician (Pediatrics)  Andrews Kinsey, PhD LP (Neuropsychology)  Lynn Lee MD as Physician (Internal Medicine)  Lynn Lee MD as Physician  Freida Nunez, WARD as Registered Nurse (Pediatrics)  Yanely Manning MA as Medical Assistant (Transplant Surgery)  Brittany Barkley [facility] as Pharmacist (Pharmacist)  Jd Stevens MD as Assigned Pediatric Specialist Provider  Olya Farah MD as Assigned PCP  MISTY ABBASI    Copy to patient  CARLITA BASURTO  [address] [facility] [id number]

## 2021-06-24 NOTE — NURSING NOTE
It is a pleasure to see Laurel. She is here for her off schedule annual and transition visit to the adult transplant team. She is not going to return to [employer]. She is planning to make her own major at [employer] in [place] [employer]. 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     Freida Nunez  Pediatric Heart Transplant, Heart Failure, and VAD Coordinator    Office: [phone]  Pager: [phone], job code [phone]

## 2021-06-24 NOTE — NURSING NOTE
"Chief Complaint   Patient presents with     Heart Problem     s/p heart transplant       BP (!) 123/91 (BP Location: Right arm, Patient Position: Sitting, Cuff Size: Adult Regular)   Pulse 116   Temp 98.8  F (37.1  C) (Oral)   Resp 20   Ht 5' 4.72\" (164.4 cm)   Wt 136 lb 0.4 oz (61.7 kg)   SpO2 97%   BMI 22.83 kg/m      Una Real, DIANELYS  June 24, 2021  "

## 2021-06-24 NOTE — PATIENT INSTRUCTIONS
Plan:   1. Follow Up appt: 6 months with labs, stress cardiac MRI, and office visit with Dr Rao to include ECHO and EKG    2. Every 3 month transplant labs due September 2021 with EBV labs  3. Medication changes:  Switch statin from simvistatin to crestor 10 mg  Magnesium 400 mg daily or a prenatal vitamin  4. Get a BP cuff and check 2-3 days per week. Also start to use a pill box per conversation with Brittany.   5. Transplant office will call you with immunosuppression lab results     Please call your transplant coordinator at the Transplant office M-F from 8 AM - 4:30 PM if you have future questions/concerns or change in status such as:    Fever above 100.4    High heart rate   Nausea/vomitting   Fatigue or generally feeling unwell  Mary Jo Pope: [phone] or Freida Nunez: [phone]  For after hour and weekend concerns please page on-call transplant coordinator at [phone] Job Code Pager [phone]    For emergencies call 911 AND call Transplant coordinator on-call at [phone] Job Code Pager [phone]

## 2021-06-25 ENCOUNTER — TELEPHONE (OUTPATIENT)
Dept: PEDIATRIC CARDIOLOGY | Facility: CLINIC | Age: 21
End: 2021-06-25

## 2021-06-25 DIAGNOSIS — Z94.1 HEART TRANSPLANTED (H): ICD-10-CM

## 2021-06-25 LAB
CMV DNA SPEC NAA+PROBE-ACNC: NORMAL [IU]/ML
CMV DNA SPEC NAA+PROBE-LOG#: NORMAL {LOG_IU}/ML
DONOR IDENTIFICATION: NORMAL
DSA COMMENTS: NORMAL
DSA PRESENT: NO
DSA TEST METHOD: NORMAL
EBV DNA # SPEC NAA+PROBE: ABNORMAL {COPIES}/ML
EBV DNA SPEC NAA+PROBE-LOG#: 5.4 {LOG_COPIES}/ML
ORGAN: NORMAL
SA1 CELL: NORMAL
SA1 COMMENTS: NORMAL
SA1 HI RISK ABY: NORMAL
SA1 MOD RISK ABY: NORMAL
SA1 TEST METHOD: NORMAL
SA2 CELL: NORMAL
SA2 COMMENTS: NORMAL
SA2 HI RISK ABY UA: NORMAL
SA2 MOD RISK ABY: NORMAL
SA2 TEST METHOD: NORMAL
SARS-COV-2 AB PNL SERPL IA: NEGATIVE
SARS-COV-2 IGG SERPL IA-ACNC: NORMAL
SPECIMEN SOURCE: NORMAL
UNACCEPTABLE ANTIGEN: NORMAL

## 2021-06-25 RX ORDER — TACROLIMUS 0.5 MG/1
1.5 CAPSULE ORAL 2 TIMES DAILY
Qty: 540 CAPSULE | Refills: 3 | Status: SHIPPED | OUTPATIENT
Start: 2021-06-25 | End: 2021-09-20

## 2021-06-25 NOTE — TELEPHONE ENCOUNTER
Laurel updated with her tacrolimus level    Tacrolimus level on 6/24: 7.8, which is up from previous level of 5.2 on 5/19  Trough: 12 hours  Presently taking Tacrolimus 2 mg BID    Goal tacrolimus level: 5-8    Per Dr Linton, I instructed Laurel to decrease her dose to 1.5 mg twice a day (3 - 0.5 mg pills) and get a repeat level in 7-14 days. She verbalized understanding. Dr Linton does not want her level quite this high with her EBV viremia.     Addendum:  Laurel's PRA, EBV, and CMV results were communicated with her via [facility]. Her follow up plan is:  Repeat tacrolimus level the week of 7/5  Full transplant labs and EBV/CMV in 3 months (late September)  Semi-annual visit with Dr Rao on the adult campus in December 2021.     Freida Nunez  Pediatric Heart Transplant, Heart Failure, and VAD Coordinator    Office: [phone]  Pager: [phone], job code [phone]

## 2021-06-26 LAB — INTERPRETATION ECG - MUSE: NORMAL

## 2021-06-27 LAB — IMMUKNOW IMMUNE CELL FUNCTION: 378 NG/ML

## 2021-06-30 NOTE — PROGRESS NOTES
Medication Therapy Management (MTM) Encounter    ASSESSMENT:                            Medication Adherence/Access: Discussed benefits of using a pillbox today with Laurel. She has been looking on [employer] and found one she likes and she will get one and start using. Continue to use alarms.     Heart Transplant: Stable. Doing well on current immunosuppression regimen of tacrolimus only, last level within goal. Magnesium continues to be slightly low off supplementation, will restart today.     Hypertension: Blood pressures above goal today <120/80, but generally within goal during past visits and ECHO appears to be normal per report.      Supplements: Vitamin D within goal >30 today, continue current supplement.  No medication issues identified today.    PLAN:                            1. Per Dr. Rao, switch from Zocor to Crestor 10 mg daily as this is the adult side preferred statin  2. Restart magnesium 400 mg daily   3. Start using a weekly pill box and continue to use your alarms to help remind of med times    Follow-up: No follow-ups on file.      SUBJECTIVE/OBJECTIVE:                          Laurel Santoyo is a 21 year old female coming in for a follow-up visit. She was referred to me from Dr. Linton. Today's visit is a co-visit with Dr. Linton. Today's visit is a follow-up MTM visit from 1/4/21     Reason for visit: transition to adult care (Dr. Rao), heart transplant.    Allergies/ADRs: Reviewed in chart  Tobacco: She reports that she has never smoked. She has never used smokeless tobacco.  Alcohol: none  Caffeine: No assessed today  Activity: Active 21 year old  Past Medical History: Reviewed in chart      Medication Adherence/Access:  Laurel reports that she takes all her doses directly from pill bottles and uses alarms but sometimes she does ignore the alarms and is late on taking medications  Patient takes medications 2 time(s) per day (0900/2100).   The patient fills medications at [facility]:  YES.     Heart Transplant:    Current immunosuppressants:  generic tacrolimus (TAC) 2 mg qAM & 2 mg qPM (goal 5-8).     Pt reports no current side effects. Of note Laurel was previously on mycophenolate and sirolimus but developed drug induced colitis and both agents were eventually stopped with resolution of symptoms. No determination was made as to which was the causative agent.    Last tacrolimus level:    Ref. Range 5/19/2021 09:16 6/24/2021 09:06   Tacrolimus Last Dose Unknown LAST DOSE 5/18 2120 06/23 2115   Tacrolimus Level Latest Ref Range: 5.0 - 15.0 ug/L 5.2 7.8       Estimated Creatinine Clearance: 95.3 mL/min (based on SCr of 0.91 mg/dL).  Pro BNP/troponin:6/24/21 265/ <0.015  Statin prevention: On simvastatin 5 mg in the morning(remembers it better in the AM). Laurel denies muscle pain or other side effects.   Recent Labs   Lab Test 07/02/20  1008 03/04/19  0819 08/18/14  0837 08/18/14  0837 02/13/14  0830   CHOL 120 114   < > 154 137   HDL 53 51   < > 63 44*   LDL 56 53   < > 75 68   TRIG 54 52   < > 78 126   CHOLHDLRATIO  --   --   --  2.4 3.0    < > = values in this interval not displayed.     Thrombosis prevention: On aspirin 81 mg daily. No issues with bleeding/bruising.   CMV prophylaxis: Complete  PCP prophylaxis: Completed   Antifungal Prophylaxis: Completed  Current supplements for electrolyte replacement: none currently, stopped magnesium a while ago. Magnesium on 6/24 was 1.4. She reports she never restarted it following last clinic visit in Jan.   Tx Coordinator: Freida Beck MD: [place], Using Med Action Plan/Med Card: No, Lab Frequency: q3 months  Recent Infections:  None  Recent Hospitalizations: None in past 30 days  Skin: uses sunscreen, annual dermatology visit advised  Dentist: every 6 months, no issues reported  Immunizations: annual flu shot 1/4/21, Pneumovax 23:  4/24/17; Prevnar 13: Received PCV 7 series, PCV 13 3/4/19, TDaP: 7/9/2012; HPV series completed;  "Menactra: series completed; Bexsaro (meningococcal B): 3/4/19; COVID (Dom): 3/10/21    Hypertension: Laurel is currently on lisinopril 10 mg daily (at night) for blood pressure and afterload reduction. She denies any side effects. She does not currently monitor BP at home. Last Echo today reported as normal.     BP Readings from Last 3 Encounters:   06/24/21 (!) 123/91   06/21/21 126/86   01/04/21 118/71     Supplements: Laurel is no longer taking a multivitamin. Last vitamin D level was done 6/24/2021 and was 41, she is on cholecalciferol 2000 units daily and continues on this with no reported issues. Laurel takes biotin per her preference to help with hair and nails.     Today's Vitals:   BP Readings from Last 1 Encounters:   06/24/21 (!) 123/91     Pulse Readings from Last 1 Encounters:   06/24/21 116     Wt Readings from Last 1 Encounters:   06/24/21 136 lb 0.4 oz (61.7 kg)     Ht Readings from Last 1 Encounters:   06/24/21 5' 4.72\" (1.644 m)     Estimated body mass index is 22.83 kg/m  as calculated from the following:    Height as of an earlier encounter on 6/24/21: 5' 4.72\" (1.644 m).    Weight as of an earlier encounter on 6/24/21: 136 lb 0.4 oz (61.7 kg).    Temp Readings from Last 1 Encounters:   06/24/21 98.8  F (37.1  C) (Oral)     ----------------      I spent 15 minutes with this patient today. All changes were made via verbal approval with Dr. Linton/Dr. Rao.     The patient was given a summary of these recommendations. See Provider note/AVS from today.     Brittany Barkley, PharmD, BCPS  Pediatric Medication Therapy Management Pharmacist-Solid Organ Transplant  Pager: [phone]        Medication Therapy Recommendations  Heart replaced by transplant (H)    Current Medication: Magnesium 400 MG TABS   Rationale: Untreated condition - Needs additional medication therapy - Indication   Recommendation: Start Medication - magnesium oxide 400 MG tablet - Take 1 tablet daily   Status: Patient Agreed " - Adherence/Education

## 2021-07-01 ENCOUNTER — TELEPHONE (OUTPATIENT)
Dept: PEDIATRIC CARDIOLOGY | Facility: CLINIC | Age: 21
End: 2021-07-01

## 2021-07-01 DIAGNOSIS — Z94.1 HEART REPLACED BY TRANSPLANT (H): Primary | ICD-10-CM

## 2021-07-02 NOTE — TELEPHONE ENCOUNTER
Laurel called because she has had more chest pain than normal in the last week. She usually has chest pain after a long busy day or if dehydrated. She is now having it when at home, relaxing. She is wondering if this is due to her medication changes. She left a message because she said that she is not too worried but wanted us to know. She was supposed to get labs the week of 7/12 for a tacro recheck.    I notified Dr Rao and left a message with Laurel (7/1 and 7/2) to check in and further discuss her symptoms. She should come for her repeat tacrolimus level within the next week (recommended 7/5-7/6) and we will get a few other repeat labs. I asked her to call back so we can assess if she needs evaluation sooner depending on the severity of the pain.     Freida Nunez  Pediatric Heart Transplant, Heart Failure, and VAD Coordinator    Office: [phone]  Pager: [phone], job code [phone]

## 2021-07-12 DIAGNOSIS — Z94.1 HEART REPLACED BY TRANSPLANT (H): Primary | ICD-10-CM

## 2021-07-12 NOTE — PROGRESS NOTES
Left message for Laurel to call back and schedule September follow up visit. Informed of Dr. Cote's EBV labs to be drawn on 7/16 with other labs. Provided direct contact information for call back.  Sondra Mejias RN on 7/12/2021 at 8:56 AM

## 2021-07-13 NOTE — TELEPHONE ENCOUNTER
Laurel called back. She thinks that she was not drinking enough water when she thought about that week as the temperatures were in the 90s for several days. She increased her hydration and her symptoms improved. She has not had chest pain again for 2 days. She will call back if this recurs. Dr Rao would like her to get a CMP, troponin, and tacrolimus level as well as additional EBV testing with her labs on 7/16/21. Laurel is in agreement with this plan.

## 2021-07-16 ENCOUNTER — LAB (OUTPATIENT)
Dept: LAB | Facility: CLINIC | Age: 21
End: 2021-07-16
Payer: COMMERCIAL

## 2021-07-16 DIAGNOSIS — Z94.1 HEART REPLACED BY TRANSPLANT (H): Primary | ICD-10-CM

## 2021-07-16 DIAGNOSIS — Z94.1 HEART REPLACED BY TRANSPLANT (H): ICD-10-CM

## 2021-07-16 LAB
ALBUMIN SERPL-MCNC: 3.7 G/DL (ref 3.4–5)
ALP SERPL-CCNC: 58 U/L (ref 40–150)
ALT SERPL W P-5'-P-CCNC: 24 U/L (ref 0–50)
ANION GAP SERPL CALCULATED.3IONS-SCNC: 3 MMOL/L (ref 3–14)
AST SERPL W P-5'-P-CCNC: 19 U/L (ref 0–45)
BILIRUB SERPL-MCNC: 0.3 MG/DL (ref 0.2–1.3)
BUN SERPL-MCNC: 11 MG/DL (ref 7–30)
CALCIUM SERPL-MCNC: 8.9 MG/DL (ref 8.5–10.1)
CHLORIDE BLD-SCNC: 106 MMOL/L (ref 94–109)
CO2 SERPL-SCNC: 26 MMOL/L (ref 20–32)
CREAT SERPL-MCNC: 0.84 MG/DL (ref 0.52–1.04)
GFR SERPL CREATININE-BSD FRML MDRD: >90 ML/MIN/1.73M2
GLUCOSE BLD-MCNC: 101 MG/DL (ref 70–99)
HOLD SPECIMEN: NORMAL
HOLD SPECIMEN: NORMAL
NT-PROBNP SERPL-MCNC: 157 PG/ML (ref 0–125)
POTASSIUM BLD-SCNC: 4.2 MMOL/L (ref 3.4–5.3)
PROT SERPL-MCNC: 8.3 G/DL (ref 6.8–8.8)
SODIUM SERPL-SCNC: 135 MMOL/L (ref 133–144)
TROPONIN I SERPL-MCNC: <0.015 UG/L (ref 0–0.04)

## 2021-07-16 PROCEDURE — 36415 COLL VENOUS BLD VENIPUNCTURE: CPT

## 2021-07-16 PROCEDURE — 83880 ASSAY OF NATRIURETIC PEPTIDE: CPT

## 2021-07-16 PROCEDURE — 86664 EPSTEIN-BARR NUCLEAR ANTIGEN: CPT

## 2021-07-16 PROCEDURE — 80197 ASSAY OF TACROLIMUS: CPT

## 2021-07-16 PROCEDURE — 84484 ASSAY OF TROPONIN QUANT: CPT

## 2021-07-16 PROCEDURE — 82040 ASSAY OF SERUM ALBUMIN: CPT

## 2021-07-17 ENCOUNTER — TELEPHONE (OUTPATIENT)
Dept: PEDIATRIC CARDIOLOGY | Facility: CLINIC | Age: 21
End: 2021-07-17

## 2021-07-17 LAB
TACROLIMUS BLD-MCNC: 8 UG/L (ref 5–15)
TME LAST DOSE: NORMAL H
TME LAST DOSE: NORMAL H

## 2021-07-17 NOTE — TELEPHONE ENCOUNTER
Laurel called to update her on her tacrolimus level. Message left on properly identified VM instructed Laurel to call coordinator on-call.     Tacrolimus level on 7/18/21: 8, which is up from previous level of 7.8 on 6/24  Trough: 11 hrs 45 min  Presently taking Tacrolimus 1.5 mg BID    Goal tacrolimus level: 5-8 - per previous notes, Dr. Linton wanted Laurel's tacro closer to 5 given EBV viremia    She has not had a  change in her tacrolimus. She reports that she is taking it consistently at 9-9:15 AM and PM.    Dose was decreased 6/24 from 2 mg BID to 1.5 mg BID.     Instructed Laurel to decrease her dose from 1.5 mg twice a day to 1.5 mg in the morning and 1 mg in the evening. She should have a repeat level in 1-2 weeks. Laurel verbalized understanding.

## 2021-07-19 LAB
EBV NA IGG SER IA-ACNC: 16.8 U/ML
EBV NA IGG SER IA-ACNC: NORMAL [IU]/ML

## 2021-07-20 DIAGNOSIS — E55.9 VITAMIN D INSUFFICIENCY: ICD-10-CM

## 2021-07-20 DIAGNOSIS — Z94.1 HEART REPLACED BY TRANSPLANT (H): ICD-10-CM

## 2021-07-20 RX ORDER — CHOLECALCIFEROL (VITAMIN D3) 50 MCG
50 TABLET ORAL DAILY
Qty: 90 TABLET | Refills: 3 | Status: SHIPPED | OUTPATIENT
Start: 2021-07-20 | End: 2022-08-02

## 2021-07-26 ENCOUNTER — TELEPHONE (OUTPATIENT)
Dept: INFECTIOUS DISEASES | Facility: CLINIC | Age: 21
End: 2021-07-26

## 2021-07-26 NOTE — TELEPHONE ENCOUNTER
Left messages on Laurel's phone to call back. Dr. Restrepo's EBV Serum PCR was not drawn with the rest of her last labs, so would like to coordinate this to be drawn with her next set of labs. Looking also to schedule Laurel for a follow up visit with Dr. Restrepo in September.  Sondra Mejias RN on 7/26/2021 at 10:13 AM

## 2021-08-04 ENCOUNTER — TELEPHONE (OUTPATIENT)
Dept: PEDIATRIC CARDIOLOGY | Facility: CLINIC | Age: 21
End: 2021-08-04

## 2021-08-04 ENCOUNTER — LAB (OUTPATIENT)
Dept: LAB | Facility: CLINIC | Age: 21
End: 2021-08-04
Payer: COMMERCIAL

## 2021-08-04 DIAGNOSIS — Z94.1 HEART REPLACED BY TRANSPLANT (H): ICD-10-CM

## 2021-08-04 LAB — HOLD SPECIMEN: NORMAL

## 2021-08-04 PROCEDURE — 36415 COLL VENOUS BLD VENIPUNCTURE: CPT

## 2021-08-04 PROCEDURE — 87799 DETECT AGENT NOS DNA QUANT: CPT

## 2021-08-04 NOTE — TELEPHONE ENCOUNTER
Laurel called to remind her she is due for repeat tacrolimus level after dose was changed. Message left on properly identified VM.     Received call from lab

## 2021-08-06 ENCOUNTER — LAB (OUTPATIENT)
Dept: LAB | Facility: CLINIC | Age: 21
End: 2021-08-06
Payer: COMMERCIAL

## 2021-08-06 DIAGNOSIS — Z94.1 HEART REPLACED BY TRANSPLANT (H): Primary | ICD-10-CM

## 2021-08-06 LAB
EBV DNA # SPEC NAA+PROBE: ABNORMAL CPY/ML
EBV DNA SPEC NAA+PROBE-LOG#: 3.3 LOG
EBV DNA SPEC QL NAA+PROBE: DETECTED
TACROLIMUS BLD-MCNC: 5.8 UG/L (ref 5–15)
TME LAST DOSE: NORMAL H
TME LAST DOSE: NORMAL H

## 2021-08-06 PROCEDURE — 36415 COLL VENOUS BLD VENIPUNCTURE: CPT

## 2021-08-06 PROCEDURE — 80197 ASSAY OF TACROLIMUS: CPT

## 2021-08-09 NOTE — TELEPHONE ENCOUNTER
Laurel updated with patient's tacrolimus level and EBV    EBV PCR Plasma 2140 (3.3 log) which is down from 6980 (3.8 log) on 3/12/21    Tacrolimus level on 8/6/21: 5.8, which is down from previous level of 8 on 7/16 after dose decreased  Trough: 11 hrs  Presently taking Tacrolimus 1.5 mg BID   Goal tacrolimus level: 5-8    Instructed patient to continue current dose (goal per Dr. Linton's previous note was to keep level around 5 given EBV viremia).     Patient communicated understanding and agrees with plan. Will plan to repeat labs in Sept, patient will call back to schedule labs or send a [facility] [employer] message.     Mary Jo Pope, RN, BSN  Pediatric Heart Transplant, Heart Failure, and VAD Coordinator  [facility]  Phone: [phone]  Pager: [phone]  Heart Transplant Coordinator On-Call: [phone] Job Code Pager [phone]

## 2021-08-20 ENCOUNTER — TELEPHONE (OUTPATIENT)
Dept: PEDIATRIC CARDIOLOGY | Facility: CLINIC | Age: 21
End: 2021-08-20

## 2021-08-20 DIAGNOSIS — Z94.1 HEART REPLACED BY TRANSPLANT (H): Primary | ICD-10-CM

## 2021-08-20 NOTE — TELEPHONE ENCOUNTER
Laurel called to ask about getting her COVID vaccine booster. She had the Vineet and Vineet (Nando) immunization in March 2021. I agreed that she would like be eligible but we do not yet have data on the safety of a booster for patients who had received this vaccine. I will reach out to her or she will get a letter when this recommendation is made. Laurel verbalized understanding. She is planning to see Dr Restrepo in September and will get labs at this same time.     Freida Nunez  Pediatric Heart Transplant, Heart Failure, and VAD Coordinator    Office: [phone]  Pager: [phone], job code [phone]

## 2021-09-18 ENCOUNTER — HEALTH MAINTENANCE LETTER (OUTPATIENT)
Age: 21
End: 2021-09-18

## 2021-09-20 ENCOUNTER — VIRTUAL VISIT (OUTPATIENT)
Dept: INFECTIOUS DISEASES | Facility: CLINIC | Age: 21
End: 2021-09-20
Attending: PEDIATRICS
Payer: COMMERCIAL

## 2021-09-20 ENCOUNTER — TELEPHONE (OUTPATIENT)
Dept: PEDIATRIC CARDIOLOGY | Facility: CLINIC | Age: 21
End: 2021-09-20

## 2021-09-20 ENCOUNTER — TELEPHONE (OUTPATIENT)
Dept: TRANSPLANT | Facility: CLINIC | Age: 21
End: 2021-09-20

## 2021-09-20 DIAGNOSIS — D84.9 IMMUNOSUPPRESSION (H): ICD-10-CM

## 2021-09-20 DIAGNOSIS — B27.90 EBV INFECTION: Primary | ICD-10-CM

## 2021-09-20 DIAGNOSIS — Z94.1 HEART TRANSPLANTED (H): ICD-10-CM

## 2021-09-20 DIAGNOSIS — Z94.1 HEART REPLACED BY TRANSPLANT (H): ICD-10-CM

## 2021-09-20 PROCEDURE — 999N000103 HC STATISTIC NO CHARGE FACILITY FEE: Mod: GT,95

## 2021-09-20 PROCEDURE — 99215 OFFICE O/P EST HI 40 MIN: CPT | Mod: 95 | Performed by: PEDIATRICS

## 2021-09-20 RX ORDER — TACROLIMUS 0.5 MG/1
CAPSULE ORAL
Qty: 150 CAPSULE | Refills: 0 | Status: SHIPPED | OUTPATIENT
Start: 2021-09-20 | End: 2021-09-20

## 2021-09-20 RX ORDER — TACROLIMUS 0.5 MG/1
CAPSULE ORAL
Qty: 150 CAPSULE | Refills: 5 | Status: SHIPPED | OUTPATIENT
Start: 2021-09-20 | End: 2021-10-04

## 2021-09-20 NOTE — TELEPHONE ENCOUNTER
Medication/Refill approved per CPA:    [facility] AGREEMENT FOR  IMMUNOSUPPRESSENT PRESCRIPTION MODIFICATION.      Routing encounter to Transplant as an FYI.    Thanks,  Laina Mina, PharmD  Specialty Pharmacist/Transplant  [facility]  [phone]

## 2021-09-20 NOTE — TELEPHONE ENCOUNTER
Pt states tacrolimus 0.5mg dose is 3 caps in the morning and 2 caps in the evening  Thank you!  lAetha Barnett CPhT  [facility]

## 2021-09-20 NOTE — PROGRESS NOTES
PEDIATRIC INFECTIOUS DISEASES  [facility]  [address], [address]th [place] [address]  Office: [phone]  Fax: [phone]     Date: 2021     To: No referring provider defined for this encounter.    Pt: Laurel Santoyo  MR: [id number]  : 2000  FAIZA: Sep 20, 2021     I had the pleasure of seeing Laurel Santoyo via video at the Pediatric Infectious Diseases Clinic at the [facility]. 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 She presents for follow up of our initial visit 21. She has previously seen my colleague Dr. Jd Stevens from heme/onc (3/23/21). Laurel has taken charge of her care over the past few years and is now fully managing her medications and appointments.      Laurel was EBV seronegative post-transplant until 18 when she was first noted to have detectable EBV capsid IgM and remained capsid IgM positive through 19 when she was first noted to have detectable whole blood EBV PCR level of 138K. She did not seroconvert to EBV capsid IgG positive until 3/25/20. In the weeks prior to her first detectable EBV PCR level, Laurel was diagnosed with pneumonia and otitis media. She was also noted at that time to have tonsillar exudates and moderate cervical lymphadenopathy on exam consistent with a mono-like syndrome. These symptoms have all subsequently resolved. Of note, as of 20, she has now also seroconverted to EBNA IgG positive.     Her whole blood EBV PCR level has remained over log 5 copies persistently since 19. Her peak this year was log 5.7 on 3/12/21 and most recently 5.4 on 21. Her most recent plasma DNA level was log 3.3, indicated some ongoing lytic replication; however, the level is coming down from a peak of log 3.8 in January.  "Despite her persistent whole blood EBV PCR level and evidence of lytic replication, she has not had any other symptoms concerning for development of PTLD. She denies unexplained fevers, ill symptoms such as runny nose, sore throat, or cough, abdominal pain, unintentional weight loss, GI upset, nausea, vomiting, or  unusual lumps or bumps. She does have chronic fatigue that waxes and wanes which has been continuing to follow the usual pattern of worsening when she is more depressed.     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 Her mood is better now: moved in with friends, currently at the end of a \"depressive episode\"; Septemvewr and October are rough times (several deaths in September freshman year of high school; dad with MADDIE in October and now sober but has been very stressful). She has been established with a new therapist as of six months ago and reports that it is a better fit for her.     For transplant care currently with pediatrics (Freida is her coordinator); will transfer to [facility] in January.     Problem list:   Patient Active Problem List   Diagnosis     IMMUNIZATION HISTORY     **Need for SBE (subacute bacterial endocarditis) prophylaxis     Heart replaced by transplant (H)     Vaccine contraindicated due to immunosuppression - NO LIVE VACCINES/ **NO MMR, NO VARICELLA, NO LIVE INFLUENZA**        ROS: 10 point ROS neg other than the symptoms noted above in the HPI.     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     s/p heart transplant 2/2004 7/12/2011 "       Past Surgical History:   Procedure Laterality Date     ANGIOGRAPHY  8/14/2012    Procedure: ANGIOGRAPHY;;  Surgeon: Melanie Arias MD;  Location: [facility] OR     C TRANSPLANTATION OF HEART  2/2004     HEART CATH CHILD  7/11/2011    Procedure:HEART CATH CHILD; Right Left, Biopsy and Possible Coarct Stent; Surgeon:MELANIE ARIAS; Location:[facility] OR     HEART CATH CHILD  8/14/2012    Procedure: HEART CATH CHILD;  Left Heart Cath, Coronary Angiogram with Possible Balloon Dilatation of Aorta;  Surgeon: Melanie Arias MD;  Location: [facility] OR     INSERT PICC LINE  11/7/2012    Procedure: INSERT PICC LINE;  Picc Line Placement;  Surgeon: ANNA Bailey MD;  Location: [facility] OR       Family History   Problem Relation Age of Onset     Other - See Comments Maternal Grandfather         mental health     Other - See Comments Mother 18        migraine headaches       Social History     Tobacco Use     Smoking status: Never Smoker     Smokeless tobacco: Never Used   Substance Use Topics     Alcohol use: No         Immunization:   Immunization History   Administered Date(s) Administered     COVID-19,PF,Dom 03/10/2021     Comvax (HIB/HepB) 2000, 07/02/2001     DTAP (<7y) 2000, 2000, 07/19/2005, 08/22/2007     HEPA 09/02/2014     HPV 08/30/2013, 10/17/2013, 09/02/2014     HepA-ped 2 Dose 04/25/2018     HepB 08/22/2007     Influenza (H1N1) 10/21/2009, 11/25/2009     Influenza (IIV3) PF 2000, 10/24/2002, 10/09/2003, 10/06/2004, 11/16/2006, 10/19/2007, 10/15/2008, 10/11/2010, 10/03/2011, 10/19/2012     Influenza Vaccine IM 18-49 Yrs, RIV3 10/08/2019     Influenza Vaccine IM > 6 months Valent IIV4 (Alfuria,Fluzone) 10/17/2013, 10/17/2014, 10/19/2015, 10/21/2016, 11/23/2018, 01/04/2021     Influenza vaccine ages 6-35 months 10/15/2009     MMR 10/11/2001, 07/19/2005     Meningococcal (Bexsero ) 03/04/2019     Meningococcal (Menactra ) 08/30/2013, 04/24/2017     Pneumo Conj 13-V  (2010&after) 03/04/2019     Pneumococcal (PCV 7) 2000, 2000, 07/02/2001     Pneumococcal 23 valent 04/24/2017     Poliovirus, inactivated (IPV) 2000, 2000, 07/19/2005, 08/22/2007     Synagis 10/11/2001, 10/09/2003, 11/04/2003, 12/03/2003, 12/30/2003, 01/26/2004, 04/06/2004, 11/30/2004, 12/28/2004, 03/10/2005, 04/06/2005     TDAP Vaccine (Boostrix) 07/09/2012     Varicella 10/11/2001     Allergies:  No Known Allergies     Current Outpatient Medications   Medication Sig Dispense Refill     amoxicillin (AMOXIL) 500 MG capsule TAKE 4 CAPSULES BY MOUTH ONCE FOR 1 DOSE, 30-60 MINUTES PRIOR TO DENTAL CLEANING (Patient taking differently: PRN.) 4 capsule 0     aspirin EC 81 MG EC tablet Take 1 tablet by mouth daily. 30 tablet 6     biotin 1000 MCG TABS tablet Take 2,000 mcg by mouth daily       lisinopril (ZESTRIL) 10 MG tablet Take 1 tablet (10 mg) by mouth daily 90 tablet 3     Magnesium 400 MG TABS Take 400 mg by mouth daily 30 tablet 3     rosuvastatin (CRESTOR) 10 MG tablet Take 1 tablet (10 mg) by mouth daily 90 tablet 3     tacrolimus (GENERIC EQUIVALENT) 0.5 MG capsule Take 3 capsules (1.5 mg) by mouth 2 times daily (Patient taking differently: Take 1.5 mg by mouth 2 times daily Two capsules in the morning and three in the afternoon.) 540 capsule 3     vitamin D3 (CHOLECALCIFEROL) 50 mcg (2000 units) tablet Take 1 tablet (50 mcg) by mouth daily 90 tablet 3          Lab:      Plasma levels   9/2/20: 3.5   1/4/21: 3.8   3/12/21: 3.8   8/4/21: 3.3    Anti-EBNA remains negative  IgM is newly positive 9/23/21      Assessment: Laurel is a 20 year old female with 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 Her primary EBV infection occurred in 2019. She continues to have low level lytic replication and is now newly IgM positive. Her whole blood EBV DNA titers have been stable without a large interval increase (mid-log 5). She has not developed a durable  positive titer for EBNA to date. Overall I would characterize her EBV DNA as moderately elevated, predominantly latent, with full establishment of latency not yet achieved (which is not concerning at this time in the absence of concerning symptoms).       Plan:      1. Continue monitoring EBV DNA PCR every three months: for the next measurement I would be in favor of doing a plasma level.      2. Please include anti-EBNA in next lab draw.     3. Follow up with me in three months to review new labs and to conduct symptomatic screening for PTLD.     Follow up: I would like to see Laurel 3 months. If symptoms reoccur or any new issues arise I would be happy to see her earlier in clinic.     I have reviewed Laurel s past medical history, family history, social history, medications and allergies as documented in the medical record. There were no additional findings except as noted.     I spent a total of 45 minutes face-to-face with Laurel Santoyo during today s office visit.  Over 50% of this time was spent counseling the patient and/or coordinating care on the same day of her clinic appointment.Sincerely,     Mark Restrepo MD, PhD  Division of Pediatric Infectious Diseases  [facility]  [facility]  Clinic Coordinator: Sondra Mejias: [phone]  [phone]  Fax: [phone]     Video Start: 12:22  Video Stop: 12:35

## 2021-09-20 NOTE — NURSING NOTE
Chief Complaint   Patient presents with     RECHECK     Follow up.      There were no vitals taken for this visit.  Minna Sultana LPN  September 20, 2021

## 2021-09-20 NOTE — LETTER
2021      RE: Laurel Santoyo  [address] [facility] [address]       Laurel is a 21 year old who is being evaluated via a billable video visit.      How would you like to obtain your AVS? MyChart  If the video visit is dropped, the invitation should be resent by: Text to cell phone: [phone]  Will anyone else be joining your video visit? Lorena Sultana LPN      PEDIATRIC INFECTIOUS DISEASES  [facility]  [facility], [address]th [place] [address]  Office: [phone]  Fax: [phone]     Date: 2021     To: No referring provider defined for this encounter.    Pt: Laurel Santoyo  MR: [id number]  : 2000  FAIZA: Sep 20, 2021     I had the pleasure of seeing Laurel Santoyo via video at the Pediatric Infectious Diseases Clinic at the [facility]. 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 She presents for follow up of our initial visit 21. She has previously seen my colleague Dr. Jd Stevens from heme/onc (3/23/21). Laurel has taken charge of her care over the past few years and is now fully managing her medications and appointments.      Laurel was EBV seronegative post-transplant until 18 when she was first noted to have detectable EBV capsid IgM and remained capsid IgM positive through 19 when she was first noted to have detectable whole blood EBV PCR level of 138K. She did not seroconvert to EBV capsid IgG positive until 3/25/20. In the weeks prior to her first detectable EBV PCR level, Laurel was diagnosed with pneumonia and otitis media. She was also noted at that time to have tonsillar exudates and moderate cervical lymphadenopathy on exam consistent with a mono-like syndrome. These symptoms have all subsequently resolved. Of note, as  "of 11/18/20, she has now also seroconverted to EBNA IgG positive.     Her whole blood EBV PCR level has remained over log 5 copies persistently since 12/26/19. Her peak this year was log 5.7 on 3/12/21 and most recently 5.4 on 6/24/21. Her most recent plasma DNA level was log 3.3, indicated some ongoing lytic replication; however, the level is coming down from a peak of log 3.8 in January. Despite her persistent whole blood EBV PCR level and evidence of lytic replication, she has not had any other symptoms concerning for development of PTLD. She denies unexplained fevers, ill symptoms such as runny nose, sore throat, or cough, abdominal pain, unintentional weight loss, GI upset, nausea, vomiting, or  unusual lumps or bumps. She does have chronic fatigue that waxes and wanes which has been continuing to follow the usual pattern of worsening when she is more depressed.     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 Her mood is better now: moved in with friends, currently at the end of a \"depressive episode\"; Septemvewr and October are rough times (several deaths in September freshman year of high school; dad with MADDIE in October and now sober but has been very stressful). She has been established with a new therapist as of six months ago and reports that it is a better fit for her.     For transplant care currently with pediatrics (Freida is her coordinator); will transfer to [facility] in January.     Problem list:   Patient Active Problem List   Diagnosis     IMMUNIZATION HISTORY     **Need for SBE (subacute bacterial endocarditis) prophylaxis     Heart replaced by transplant (H)     Vaccine contraindicated due to immunosuppression - NO LIVE VACCINES/ **NO MMR, NO VARICELLA, NO LIVE INFLUENZA**        ROS: 10 point ROS neg other than the symptoms noted above in the HPI.     Past Medical History:   Diagnosis Date     Heart " transplant, orthotopic, status  2/2004 8/14/2012     HLHS (hypoplastic left heart syndrome) 8/22/2007     HLHS (hypoplastic left heart syndrome) 8/22/2007    Hx of Heart Transplant in 2/2004 at age 3 years Nov, 2008 sub-aortic membrane removal      PONV (postoperative nausea and vomiting)      Post-operative aortic arch obstruction 7/12/2011     s/p heart transplant 2/2004 7/12/2011       Past Surgical History:   Procedure Laterality Date     ANGIOGRAPHY  8/14/2012    Procedure: ANGIOGRAPHY;;  Surgeon: Melanie Arias MD;  Location: [facility] OR     C TRANSPLANTATION OF HEART  2/2004     HEART CATH CHILD  7/11/2011    Procedure:HEART CATH CHILD; Right Left, Biopsy and Possible Coarct Stent; Surgeon:MELANIE ARIAS; Location:[facility] OR     HEART CATH CHILD  8/14/2012    Procedure: HEART CATH CHILD;  Left Heart Cath, Coronary Angiogram with Possible Balloon Dilatation of Aorta;  Surgeon: Melanie Arias MD;  Location: [facility] OR     INSERT PICC LINE  11/7/2012    Procedure: INSERT PICC LINE;  Picc Line Placement;  Surgeon: ANNA Bailey MD;  Location: [facility] OR       Family History   Problem Relation Age of Onset     Other - See Comments Maternal Grandfather         mental health     Other - See Comments Mother 18        migraine headaches       Social History     Tobacco Use     Smoking status: Never Smoker     Smokeless tobacco: Never Used   Substance Use Topics     Alcohol use: No         Immunization:   Immunization History   Administered Date(s) Administered     COVID-19,PF,Dom 03/10/2021     Comvax (HIB/HepB) 2000, 07/02/2001     DTAP (<7y) 2000, 2000, 07/19/2005, 08/22/2007     HEPA 09/02/2014     HPV 08/30/2013, 10/17/2013, 09/02/2014     HepA-ped 2 Dose 04/25/2018     HepB 08/22/2007     Influenza (H1N1) 10/21/2009, 11/25/2009     Influenza (IIV3) PF 2000, 10/24/2002, 10/09/2003, 10/06/2004, 11/16/2006, 10/19/2007, 10/15/2008, 10/11/2010, 10/03/2011, 10/19/2012      Influenza Vaccine IM 18-49 Yrs, RIV3 10/08/2019     Influenza Vaccine IM > 6 months Valent IIV4 (Alfuria,Fluzone) 10/17/2013, 10/17/2014, 10/19/2015, 10/21/2016, 11/23/2018, 01/04/2021     Influenza vaccine ages 6-35 months 10/15/2009     MMR 10/11/2001, 07/19/2005     Meningococcal (Bexsero ) 03/04/2019     Meningococcal (Menactra ) 08/30/2013, 04/24/2017     Pneumo Conj 13-V (2010&after) 03/04/2019     Pneumococcal (PCV 7) 2000, 2000, 07/02/2001     Pneumococcal 23 valent 04/24/2017     Poliovirus, inactivated (IPV) 2000, 2000, 07/19/2005, 08/22/2007     Synagis 10/11/2001, 10/09/2003, 11/04/2003, 12/03/2003, 12/30/2003, 01/26/2004, 04/06/2004, 11/30/2004, 12/28/2004, 03/10/2005, 04/06/2005     TDAP Vaccine (Boostrix) 07/09/2012     Varicella 10/11/2001     Allergies:  No Known Allergies     Current Outpatient Medications   Medication Sig Dispense Refill     amoxicillin (AMOXIL) 500 MG capsule TAKE 4 CAPSULES BY MOUTH ONCE FOR 1 DOSE, 30-60 MINUTES PRIOR TO DENTAL CLEANING (Patient taking differently: PRN.) 4 capsule 0     aspirin EC 81 MG EC tablet Take 1 tablet by mouth daily. 30 tablet 6     biotin 1000 MCG TABS tablet Take 2,000 mcg by mouth daily       lisinopril (ZESTRIL) 10 MG tablet Take 1 tablet (10 mg) by mouth daily 90 tablet 3     Magnesium 400 MG TABS Take 400 mg by mouth daily 30 tablet 3     rosuvastatin (CRESTOR) 10 MG tablet Take 1 tablet (10 mg) by mouth daily 90 tablet 3     tacrolimus (GENERIC EQUIVALENT) 0.5 MG capsule Take 3 capsules (1.5 mg) by mouth 2 times daily (Patient taking differently: Take 1.5 mg by mouth 2 times daily Two capsules in the morning and three in the afternoon.) 540 capsule 3     vitamin D3 (CHOLECALCIFEROL) 50 mcg (2000 units) tablet Take 1 tablet (50 mcg) by mouth daily 90 tablet 3          Lab:      Plasma levels   9/2/20: 3.5   1/4/21: 3.8   3/12/21: 3.8   8/4/21: 3.3    Anti-EBNA remains negative  IgM is newly positive  21      Assessment: Laurel is a 20 year old female with 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 Her primary EBV infection occurred in 2019. She continues to have low level lytic replication and is now newly IgM positive. Her whole blood EBV DNA titers have been stable without a large interval increase (mid-log 5). She has not developed a durable positive titer for EBNA to date. Overall I would characterize her EBV DNA as moderately elevated, predominantly latent, with full establishment of latency not yet achieved (which is not concerning at this time in the absence of concerning symptoms).       Plan:      1. Continue monitoring EBV DNA PCR every three months: for the next measurement I would be in favor of doing a plasma level.      2. Please include anti-EBNA in next lab draw.     3. Follow up with me in three months to review new labs and to conduct symptomatic screening for PTLD.     Follow up: I would like to see Laurel 3 months. If symptoms reoccur or any new issues arise I would be happy to see her earlier in clinic.     I have reviewed Laurel s past medical history, family history, social history, medications and allergies as documented in the medical record. There were no additional findings except as noted.     I spent a total of 45 minutes face-to-face with Laurel Santoyo during today s office visit.  Over 50% of this time was spent counseling the patient and/or coordinating care on the same day of her clinic appointment.Sincerely,     Mark Restrepo MD, PhD  Division of Pediatric Infectious Diseases  [facility]  Clinic Coordinator: Sondra Mejias: [phone]  [phone]  Fax: [phone]     Video Start: 12:22  Video Stop: 12:35        Mark Restrepo MD

## 2021-09-23 ENCOUNTER — LAB (OUTPATIENT)
Dept: LAB | Facility: CLINIC | Age: 21
End: 2021-09-23
Payer: COMMERCIAL

## 2021-09-23 DIAGNOSIS — Z94.1 HEART REPLACED BY TRANSPLANT (H): ICD-10-CM

## 2021-09-23 LAB
ALBUMIN SERPL-MCNC: 3.6 G/DL (ref 3.4–5)
ALP SERPL-CCNC: 61 U/L (ref 40–150)
ALT SERPL W P-5'-P-CCNC: 19 U/L (ref 0–50)
ANION GAP SERPL CALCULATED.3IONS-SCNC: 4 MMOL/L (ref 3–14)
AST SERPL W P-5'-P-CCNC: 10 U/L (ref 0–45)
BASOPHILS # BLD AUTO: 0.1 10E3/UL (ref 0–0.2)
BASOPHILS NFR BLD AUTO: 1 %
BILIRUB SERPL-MCNC: 0.3 MG/DL (ref 0.2–1.3)
BUN SERPL-MCNC: 11 MG/DL (ref 7–30)
CALCIUM SERPL-MCNC: 8.8 MG/DL (ref 8.5–10.1)
CHLORIDE BLD-SCNC: 107 MMOL/L (ref 94–109)
CMV DNA SPEC NAA+PROBE-ACNC: NOT DETECTED IU/ML
CMV IGG SERPL IA-ACNC: <0.2 U/ML
CMV IGG SERPL IA-ACNC: NORMAL
CMV IGM SERPL IA-ACNC: <8 AU/ML
CMV IGM SERPL IA-ACNC: NEGATIVE
CO2 SERPL-SCNC: 28 MMOL/L (ref 20–32)
CREAT SERPL-MCNC: 0.85 MG/DL (ref 0.52–1.04)
EBV NA IGG SER IA-ACNC: 11.8 U/ML
EBV NA IGG SER IA-ACNC: NORMAL [IU]/ML
EBV VCA IGG SER IA-ACNC: >750 U/ML
EBV VCA IGG SER IA-ACNC: POSITIVE
EBV VCA IGM SER IA-ACNC: 81.3 U/ML
EBV VCA IGM SER IA-ACNC: ABNORMAL
EOSINOPHIL # BLD AUTO: 0.8 10E3/UL (ref 0–0.7)
EOSINOPHIL NFR BLD AUTO: 12 %
ERYTHROCYTE [DISTWIDTH] IN BLOOD BY AUTOMATED COUNT: 13.7 % (ref 10–15)
GFR SERPL CREATININE-BSD FRML MDRD: >90 ML/MIN/1.73M2
GLUCOSE BLD-MCNC: 116 MG/DL (ref 70–99)
HCT VFR BLD AUTO: 38.4 % (ref 35–47)
HGB BLD-MCNC: 12.1 G/DL (ref 11.7–15.7)
IMM GRANULOCYTES # BLD: 0 10E3/UL
IMM GRANULOCYTES NFR BLD: 0 %
LYMPHOCYTES # BLD AUTO: 1.4 10E3/UL (ref 0.8–5.3)
LYMPHOCYTES NFR BLD AUTO: 19 %
MAGNESIUM SERPL-MCNC: 1.4 MG/DL (ref 1.6–2.3)
MCH RBC QN AUTO: 26.8 PG (ref 26.5–33)
MCHC RBC AUTO-ENTMCNC: 31.5 G/DL (ref 31.5–36.5)
MCV RBC AUTO: 85 FL (ref 78–100)
MONOCYTES # BLD AUTO: 0.9 10E3/UL (ref 0–1.3)
MONOCYTES NFR BLD AUTO: 13 %
NEUTROPHILS # BLD AUTO: 3.9 10E3/UL (ref 1.6–8.3)
NEUTROPHILS NFR BLD AUTO: 55 %
NRBC # BLD AUTO: 0 10E3/UL
NRBC BLD AUTO-RTO: 0 /100
PHOSPHATE SERPL-MCNC: 3.8 MG/DL (ref 2.5–4.5)
PLATELET # BLD AUTO: 271 10E3/UL (ref 150–450)
POTASSIUM BLD-SCNC: 4.3 MMOL/L (ref 3.4–5.3)
PROT SERPL-MCNC: 8.1 G/DL (ref 6.8–8.8)
RBC # BLD AUTO: 4.51 10E6/UL (ref 3.8–5.2)
SODIUM SERPL-SCNC: 139 MMOL/L (ref 133–144)
TACROLIMUS BLD-MCNC: 8.4 UG/L (ref 5–15)
TME LAST DOSE: NORMAL H
TME LAST DOSE: NORMAL H
TROPONIN I SERPL-MCNC: <0.015 UG/L (ref 0–0.04)
WBC # BLD AUTO: 7.1 10E3/UL (ref 4–11)

## 2021-09-23 PROCEDURE — 86665 EPSTEIN-BARR CAPSID VCA: CPT

## 2021-09-23 PROCEDURE — 86644 CMV ANTIBODY: CPT

## 2021-09-23 PROCEDURE — 86664 EPSTEIN-BARR NUCLEAR ANTIGEN: CPT

## 2021-09-23 PROCEDURE — 87799 DETECT AGENT NOS DNA QUANT: CPT

## 2021-09-23 PROCEDURE — 84484 ASSAY OF TROPONIN QUANT: CPT

## 2021-09-23 PROCEDURE — 86645 CMV ANTIBODY IGM: CPT

## 2021-09-23 PROCEDURE — 80053 COMPREHEN METABOLIC PANEL: CPT

## 2021-09-23 PROCEDURE — 85025 COMPLETE CBC W/AUTO DIFF WBC: CPT

## 2021-09-23 PROCEDURE — 84100 ASSAY OF PHOSPHORUS: CPT

## 2021-09-23 PROCEDURE — 80197 ASSAY OF TACROLIMUS: CPT

## 2021-09-23 PROCEDURE — 36415 COLL VENOUS BLD VENIPUNCTURE: CPT

## 2021-09-23 PROCEDURE — 83735 ASSAY OF MAGNESIUM: CPT

## 2021-09-24 ENCOUNTER — TELEPHONE (OUTPATIENT)
Dept: PEDIATRIC CARDIOLOGY | Facility: CLINIC | Age: 21
End: 2021-09-24

## 2021-09-24 DIAGNOSIS — Z94.1 HEART TRANSPLANTED (H): ICD-10-CM

## 2021-09-24 LAB
EBV DNA COPIES/ML, INSTRUMENT: ABNORMAL COPIES/ML
EBV DNA SPEC NAA+PROBE-LOG#: 5.4 {LOG_COPIES}/ML

## 2021-09-28 DIAGNOSIS — Z94.1 TRANSPLANTED HEART (H): Primary | ICD-10-CM

## 2021-09-28 NOTE — PROGRESS NOTES
Message sent to schedulers for the following for Nov/Dec 2021:     Lab  cxr  Cardiac MRI  In person clinic with Dr. Rao.

## 2021-09-30 ENCOUNTER — TELEPHONE (OUTPATIENT)
Dept: TRANSPLANT | Facility: CLINIC | Age: 21
End: 2021-09-30

## 2021-09-30 DIAGNOSIS — Z94.1 TRANSPLANTED HEART (H): Primary | ICD-10-CM

## 2021-10-04 RX ORDER — TACROLIMUS 0.5 MG/1
1 CAPSULE ORAL 2 TIMES DAILY
Qty: 120 CAPSULE | Refills: 11 | Status: SHIPPED | OUTPATIENT
Start: 2021-10-04 | End: 2022-07-26

## 2021-10-04 NOTE — TELEPHONE ENCOUNTER
Laurel updated with her tacrolimus level    Tacrolimus level on 9/23: 8.4, which is up from previous level of 5.8 on 8/6  Trough: 11.5 hours  Presently taking Tacrolimus 1.5 mg in the morning and 1 mg in the evening    Goal tacrolimus level: 5-8    Any nausea/vommitting/diarrhea: none reported   Any missed doses: none reported  Any change in medications since last level: none    Instructed Laurel to decrease her dose to 1 mg twice daily starting on 10/5. She verbalized understanding. She said that she does not have school on October 20-21 so she will go for her repeat level then. I let her know that her new coordinator will be Lubna Schwab. Laurel is scheduled for her annual visit in the beginning of January so we will check any labs needed in mid-October.     Freida Nunez  Pediatric Heart Transplant, Heart Failure, and VAD Coordinator    Office: [phone]  Pager: [phone], job code [phone]

## 2021-11-09 ENCOUNTER — TELEPHONE (OUTPATIENT)
Dept: TRANSPLANT | Facility: CLINIC | Age: 21
End: 2021-11-09

## 2021-11-09 NOTE — TELEPHONE ENCOUNTER
Patient Call: General  Route to LPN    Reason for call: Patient is wanting to review getting the booster since she got [employer]et [employer] Vineet vaccine & has plans for an event on 11/15. Patient is attempting to schedule booster through [place]    *Update - Patient also wants to know does she need her blood drawn before lab in Jan 2022      Call back needed? Yes    Return Call Needed  Same as documented in contacts section  When to return call?: Same day: Route High Priority

## 2021-11-11 NOTE — TELEPHONE ENCOUNTER
Left message for patient, per CDC guidelines, patients who received the J&J vaccine should receive a booster. There is no preference to which booster the patient receives. Both the moderna and pfizer vaccine are approved for booster. Asked patient to call back to clarify question about labs.

## 2021-12-08 ENCOUNTER — TELEPHONE (OUTPATIENT)
Dept: TRANSPLANT | Facility: CLINIC | Age: 21
End: 2021-12-08
Payer: COMMERCIAL

## 2021-12-09 DIAGNOSIS — Z94.1 TRANSPLANTED HEART (H): Primary | ICD-10-CM

## 2021-12-09 NOTE — TELEPHONE ENCOUNTER
Call returned to patient on 12/8. Pt states she is experiencing some chest pains. Nothing new. It comes with anxiety. She recently had a disagreement with roommate and is feeling anxious. Pt wondering if she needs a tac level checked. Pt never had repeat level after recent adjustment so writer encouraged her to get a level within a week. appt made for patient. 12 hour tough reviewed. Pt verbalized that chest pains aren't concerning but she would like to discuss some anxiety medication options that don't interfere with cardiac meds at upcoming appointments with Dr. Rao.

## 2021-12-14 ENCOUNTER — LAB (OUTPATIENT)
Dept: LAB | Facility: CLINIC | Age: 21
End: 2021-12-14
Payer: COMMERCIAL

## 2021-12-14 DIAGNOSIS — Z94.1 TRANSPLANTED HEART (H): ICD-10-CM

## 2021-12-14 LAB
ANION GAP SERPL CALCULATED.3IONS-SCNC: 10 MMOL/L (ref 3–14)
BUN SERPL-MCNC: 12 MG/DL (ref 7–30)
CALCIUM SERPL-MCNC: 9 MG/DL (ref 8.5–10.1)
CHLORIDE BLD-SCNC: 105 MMOL/L (ref 94–109)
CO2 SERPL-SCNC: 25 MMOL/L (ref 20–32)
CREAT SERPL-MCNC: 0.71 MG/DL (ref 0.52–1.04)
GFR SERPL CREATININE-BSD FRML MDRD: >90 ML/MIN/1.73M2
GLUCOSE BLD-MCNC: 129 MG/DL (ref 70–99)
POTASSIUM BLD-SCNC: 4 MMOL/L (ref 3.4–5.3)
SODIUM SERPL-SCNC: 140 MMOL/L (ref 133–144)
TACROLIMUS BLD-MCNC: 5.9 UG/L (ref 5–15)
TME LAST DOSE: NORMAL H
TME LAST DOSE: NORMAL H

## 2021-12-14 PROCEDURE — 99000 SPECIMEN HANDLING OFFICE-LAB: CPT | Performed by: PATHOLOGY

## 2021-12-14 PROCEDURE — 80197 ASSAY OF TACROLIMUS: CPT | Mod: 90 | Performed by: PATHOLOGY

## 2021-12-14 PROCEDURE — 80048 BASIC METABOLIC PNL TOTAL CA: CPT | Performed by: PATHOLOGY

## 2021-12-14 PROCEDURE — 36415 COLL VENOUS BLD VENIPUNCTURE: CPT | Performed by: PATHOLOGY

## 2022-01-03 ENCOUNTER — TELEPHONE (OUTPATIENT)
Dept: TRANSPLANT | Facility: CLINIC | Age: 22
End: 2022-01-03
Payer: COMMERCIAL

## 2022-01-03 NOTE — TELEPHONE ENCOUNTER
Pt due for her 6 mo appt- her roommates sibling tested positive for COVID  She has not been around the person  Her roommate  Is getting tested today should she still come in for her appt?

## 2022-01-03 NOTE — TELEPHONE ENCOUNTER
Call returned to patient. Pt states she's only been around her roommate for 5 minutes yesterday then they quarantined away from one another. Her roommate is getting tested tonight. Right now Laurel is asymptomatic. Writer suggested waiting to get her roommates results then we will discuss plan. Laurel verbalized understanding and agrees with the plan.

## 2022-01-04 ENCOUNTER — PRE VISIT (OUTPATIENT)
Dept: CARDIOLOGY | Facility: CLINIC | Age: 22
End: 2022-01-04
Payer: COMMERCIAL

## 2022-01-04 DIAGNOSIS — Z13.220 LIPID SCREENING: ICD-10-CM

## 2022-01-04 DIAGNOSIS — Z94.1 TRANSPLANTED HEART (H): Primary | ICD-10-CM

## 2022-01-04 DIAGNOSIS — Z13.1 DIABETES MELLITUS SCREENING: ICD-10-CM

## 2022-01-05 ENCOUNTER — APPOINTMENT (OUTPATIENT)
Dept: URGENT CARE | Facility: CLINIC | Age: 22
End: 2022-01-05
Payer: COMMERCIAL

## 2022-01-08 ENCOUNTER — HEALTH MAINTENANCE LETTER (OUTPATIENT)
Age: 22
End: 2022-01-08

## 2022-01-10 ENCOUNTER — TELEPHONE (OUTPATIENT)
Dept: TRANSPLANT | Facility: CLINIC | Age: 22
End: 2022-01-10
Payer: COMMERCIAL

## 2022-01-10 NOTE — TELEPHONE ENCOUNTER
Patient Call: Voicemail  Date/Time: 1/10/22 @ 12:40pm  Reason for call: pt left vm that she needs to reschedule her 6 month appt because the doctor is not available. She was also exposed to COVID. She is waiting on test results and will call back when she gets them

## 2022-01-13 ENCOUNTER — TELEPHONE (OUTPATIENT)
Dept: CARDIOLOGY | Facility: CLINIC | Age: 22
End: 2022-01-13
Payer: COMMERCIAL

## 2022-01-13 NOTE — TELEPHONE ENCOUNTER
Appointments rescheduled for next available. 2/23 and 2/28. Pt had to cancel previous appointment due to covid exposure

## 2022-04-12 ENCOUNTER — LAB (OUTPATIENT)
Dept: LAB | Facility: CLINIC | Age: 22
End: 2022-04-12
Payer: COMMERCIAL

## 2022-04-12 DIAGNOSIS — Z13.220 LIPID SCREENING: ICD-10-CM

## 2022-04-12 DIAGNOSIS — Z94.1 TRANSPLANTED HEART (H): ICD-10-CM

## 2022-04-12 DIAGNOSIS — Z13.1 DIABETES MELLITUS SCREENING: ICD-10-CM

## 2022-04-12 LAB
ALBUMIN SERPL-MCNC: 3.7 G/DL (ref 3.4–5)
ALP SERPL-CCNC: 61 U/L (ref 40–150)
ALT SERPL W P-5'-P-CCNC: 20 U/L (ref 0–50)
ANION GAP SERPL CALCULATED.3IONS-SCNC: 7 MMOL/L (ref 3–14)
AST SERPL W P-5'-P-CCNC: 15 U/L (ref 0–45)
BASOPHILS # BLD AUTO: 0 10E3/UL (ref 0–0.2)
BASOPHILS NFR BLD AUTO: 1 %
BILIRUB SERPL-MCNC: 0.4 MG/DL (ref 0.2–1.3)
BUN SERPL-MCNC: 12 MG/DL (ref 7–30)
CALCIUM SERPL-MCNC: 9.5 MG/DL (ref 8.5–10.1)
CHLORIDE BLD-SCNC: 105 MMOL/L (ref 94–109)
CHOLEST SERPL-MCNC: 104 MG/DL
CK SERPL-CCNC: 39 U/L (ref 30–225)
CMV DNA SPEC NAA+PROBE-ACNC: NOT DETECTED IU/ML
CO2 SERPL-SCNC: 27 MMOL/L (ref 20–32)
CREAT SERPL-MCNC: 0.78 MG/DL (ref 0.52–1.04)
EOSINOPHIL # BLD AUTO: 0.6 10E3/UL (ref 0–0.7)
EOSINOPHIL NFR BLD AUTO: 11 %
ERYTHROCYTE [DISTWIDTH] IN BLOOD BY AUTOMATED COUNT: 13.6 % (ref 10–15)
FASTING STATUS PATIENT QL REPORTED: YES
GFR SERPL CREATININE-BSD FRML MDRD: >90 ML/MIN/1.73M2
GLUCOSE BLD-MCNC: 121 MG/DL (ref 70–99)
HBA1C MFR BLD: 5.9 % (ref 0–5.6)
HCT VFR BLD AUTO: 39 % (ref 35–47)
HDLC SERPL-MCNC: 53 MG/DL
HGB BLD-MCNC: 12.6 G/DL (ref 11.7–15.7)
IMM GRANULOCYTES # BLD: 0 10E3/UL
IMM GRANULOCYTES NFR BLD: 0 %
LDLC SERPL CALC-MCNC: 36 MG/DL
LYMPHOCYTES # BLD AUTO: 1.4 10E3/UL (ref 0.8–5.3)
LYMPHOCYTES NFR BLD AUTO: 24 %
MAGNESIUM SERPL-MCNC: 1.5 MG/DL (ref 1.6–2.3)
MCH RBC QN AUTO: 27 PG (ref 26.5–33)
MCHC RBC AUTO-ENTMCNC: 32.3 G/DL (ref 31.5–36.5)
MCV RBC AUTO: 84 FL (ref 78–100)
MONOCYTES # BLD AUTO: 0.6 10E3/UL (ref 0–1.3)
MONOCYTES NFR BLD AUTO: 11 %
NEUTROPHILS # BLD AUTO: 3 10E3/UL (ref 1.6–8.3)
NEUTROPHILS NFR BLD AUTO: 53 %
NONHDLC SERPL-MCNC: 51 MG/DL
NRBC # BLD AUTO: 0 10E3/UL
NRBC BLD AUTO-RTO: 0 /100
PHOSPHATE SERPL-MCNC: 3.8 MG/DL (ref 2.5–4.5)
PLATELET # BLD AUTO: 216 10E3/UL (ref 150–450)
POTASSIUM BLD-SCNC: 4.1 MMOL/L (ref 3.4–5.3)
PROT SERPL-MCNC: 8.5 G/DL (ref 6.8–8.8)
RBC # BLD AUTO: 4.67 10E6/UL (ref 3.8–5.2)
SODIUM SERPL-SCNC: 139 MMOL/L (ref 133–144)
TACROLIMUS BLD-MCNC: 5.4 UG/L (ref 5–15)
TME LAST DOSE: NORMAL H
TME LAST DOSE: NORMAL H
TRIGL SERPL-MCNC: 77 MG/DL
WBC # BLD AUTO: 5.7 10E3/UL (ref 4–11)

## 2022-04-12 PROCEDURE — 84100 ASSAY OF PHOSPHORUS: CPT | Performed by: PATHOLOGY

## 2022-04-12 PROCEDURE — 99000 SPECIMEN HANDLING OFFICE-LAB: CPT | Performed by: PATHOLOGY

## 2022-04-12 PROCEDURE — 86832 HLA CLASS I HIGH DEFIN QUAL: CPT | Performed by: PATHOLOGY

## 2022-04-12 PROCEDURE — 87799 DETECT AGENT NOS DNA QUANT: CPT | Mod: 90 | Performed by: PATHOLOGY

## 2022-04-12 PROCEDURE — 83036 HEMOGLOBIN GLYCOSYLATED A1C: CPT | Performed by: PATHOLOGY

## 2022-04-12 PROCEDURE — 83735 ASSAY OF MAGNESIUM: CPT | Performed by: PATHOLOGY

## 2022-04-12 PROCEDURE — 36415 COLL VENOUS BLD VENIPUNCTURE: CPT | Performed by: PATHOLOGY

## 2022-04-12 PROCEDURE — 80061 LIPID PANEL: CPT | Performed by: PATHOLOGY

## 2022-04-12 PROCEDURE — 80197 ASSAY OF TACROLIMUS: CPT | Mod: 90 | Performed by: PATHOLOGY

## 2022-04-12 PROCEDURE — 85025 COMPLETE CBC W/AUTO DIFF WBC: CPT | Performed by: PATHOLOGY

## 2022-04-12 PROCEDURE — 80053 COMPREHEN METABOLIC PANEL: CPT | Performed by: PATHOLOGY

## 2022-04-12 PROCEDURE — 86833 HLA CLASS II HIGH DEFIN QUAL: CPT | Performed by: PATHOLOGY

## 2022-04-12 PROCEDURE — 82550 ASSAY OF CK (CPK): CPT | Performed by: PATHOLOGY

## 2022-04-12 PROCEDURE — 86352 CELL FUNCTION ASSAY W/STIM: CPT | Mod: 90 | Performed by: PATHOLOGY

## 2022-04-13 ENCOUNTER — HOSPITAL ENCOUNTER (OUTPATIENT)
Dept: GENERAL RADIOLOGY | Facility: CLINIC | Age: 22
Discharge: HOME | End: 2022-04-13
Attending: INTERNAL MEDICINE
Payer: COMMERCIAL

## 2022-04-13 ENCOUNTER — OFFICE VISIT (OUTPATIENT)
Dept: CARDIOLOGY | Facility: CLINIC | Age: 22
End: 2022-04-13
Attending: INTERNAL MEDICINE
Payer: COMMERCIAL

## 2022-04-13 ENCOUNTER — HOSPITAL ENCOUNTER (OUTPATIENT)
Dept: MRI IMAGING | Facility: CLINIC | Age: 22
Discharge: HOME | End: 2022-04-13
Attending: INTERNAL MEDICINE
Payer: COMMERCIAL

## 2022-04-13 VITALS
BODY MASS INDEX: 22.82 KG/M2 | OXYGEN SATURATION: 98 % | WEIGHT: 137 LBS | HEART RATE: 103 BPM | DIASTOLIC BLOOD PRESSURE: 79 MMHG | SYSTOLIC BLOOD PRESSURE: 138 MMHG | HEIGHT: 65 IN

## 2022-04-13 VITALS
OXYGEN SATURATION: 97 % | DIASTOLIC BLOOD PRESSURE: 81 MMHG | HEART RATE: 101 BPM | SYSTOLIC BLOOD PRESSURE: 156 MMHG | RESPIRATION RATE: 16 BRPM

## 2022-04-13 DIAGNOSIS — Z94.1 TRANSPLANTED HEART (H): ICD-10-CM

## 2022-04-13 DIAGNOSIS — Z94.1 TRANSPLANTED HEART (H): Primary | ICD-10-CM

## 2022-04-13 LAB
EBV DNA COPIES/ML, INSTRUMENT: ABNORMAL COPIES/ML
EBV DNA SPEC NAA+PROBE-LOG#: 5.6 {LOG_COPIES}/ML

## 2022-04-13 PROCEDURE — 71046 X-RAY EXAM CHEST 2 VIEWS: CPT

## 2022-04-13 PROCEDURE — 255N000002 HC RX 255 OP 636: Performed by: INTERNAL MEDICINE

## 2022-04-13 PROCEDURE — 99215 OFFICE O/P EST HI 40 MIN: CPT | Mod: 25 | Performed by: INTERNAL MEDICINE

## 2022-04-13 PROCEDURE — 93010 ELECTROCARDIOGRAM REPORT: CPT | Mod: 76 | Performed by: INTERNAL MEDICINE

## 2022-04-13 PROCEDURE — 75563 CARD MRI W/STRESS IMG & DYE: CPT

## 2022-04-13 PROCEDURE — A9585 GADOBUTROL INJECTION: HCPCS | Performed by: INTERNAL MEDICINE

## 2022-04-13 PROCEDURE — 93005 ELECTROCARDIOGRAM TRACING: CPT

## 2022-04-13 PROCEDURE — 93016 CV STRESS TEST SUPVJ ONLY: CPT | Performed by: STUDENT IN AN ORGANIZED HEALTH CARE EDUCATION/TRAINING PROGRAM

## 2022-04-13 PROCEDURE — 250N000011 HC RX IP 250 OP 636: Performed by: STUDENT IN AN ORGANIZED HEALTH CARE EDUCATION/TRAINING PROGRAM

## 2022-04-13 PROCEDURE — 999N000054 HC STATISTIC EKG NON-CHARGEABLE

## 2022-04-13 PROCEDURE — 71046 X-RAY EXAM CHEST 2 VIEWS: CPT | Mod: 26 | Performed by: RADIOLOGY

## 2022-04-13 PROCEDURE — 999N000207 HC UMP OPEN ENCOUNTER >50 DAYS

## 2022-04-13 PROCEDURE — 93018 CV STRESS TEST I&R ONLY: CPT | Performed by: STUDENT IN AN ORGANIZED HEALTH CARE EDUCATION/TRAINING PROGRAM

## 2022-04-13 PROCEDURE — G0463 HOSPITAL OUTPT CLINIC VISIT: HCPCS

## 2022-04-13 PROCEDURE — 75563 CARD MRI W/STRESS IMG & DYE: CPT | Mod: 26 | Performed by: STUDENT IN AN ORGANIZED HEALTH CARE EDUCATION/TRAINING PROGRAM

## 2022-04-13 PROCEDURE — 93017 CV STRESS TEST TRACING ONLY: CPT

## 2022-04-13 RX ORDER — DIPHENHYDRAMINE HYDROCHLORIDE 50 MG/ML
25-50 INJECTION INTRAMUSCULAR; INTRAVENOUS
Status: DISCONTINUED | OUTPATIENT
Start: 2022-04-13 | End: 2022-04-14 | Stop reason: HOSPADM

## 2022-04-13 RX ORDER — ALBUTEROL SULFATE 90 UG/1
2 AEROSOL, METERED RESPIRATORY (INHALATION) EVERY 5 MIN PRN
Status: DISCONTINUED | OUTPATIENT
Start: 2022-04-13 | End: 2022-04-14 | Stop reason: HOSPADM

## 2022-04-13 RX ORDER — ONDANSETRON 2 MG/ML
4 INJECTION INTRAMUSCULAR; INTRAVENOUS
Status: DISCONTINUED | OUTPATIENT
Start: 2022-04-13 | End: 2022-04-14 | Stop reason: HOSPADM

## 2022-04-13 RX ORDER — GADOBUTROL 604.72 MG/ML
7.5 INJECTION INTRAVENOUS ONCE
Status: COMPLETED | OUTPATIENT
Start: 2022-04-13 | End: 2022-04-13

## 2022-04-13 RX ORDER — CAFFEINE CITRATE 20 MG/ML
60 SOLUTION INTRAVENOUS
Status: DISCONTINUED | OUTPATIENT
Start: 2022-04-13 | End: 2022-04-14 | Stop reason: HOSPADM

## 2022-04-13 RX ORDER — AMINOPHYLLINE 25 MG/ML
100 INJECTION, SOLUTION INTRAVENOUS ONCE
Status: COMPLETED | OUTPATIENT
Start: 2022-04-13 | End: 2022-04-13

## 2022-04-13 RX ORDER — DIAZEPAM 5 MG
5 TABLET ORAL EVERY 30 MIN PRN
Status: DISCONTINUED | OUTPATIENT
Start: 2022-04-13 | End: 2022-04-14 | Stop reason: HOSPADM

## 2022-04-13 RX ORDER — ACYCLOVIR 200 MG/1
0-1 CAPSULE ORAL
Status: DISCONTINUED | OUTPATIENT
Start: 2022-04-13 | End: 2022-04-14 | Stop reason: HOSPADM

## 2022-04-13 RX ORDER — REGADENOSON 0.08 MG/ML
0.4 INJECTION, SOLUTION INTRAVENOUS ONCE
Status: COMPLETED | OUTPATIENT
Start: 2022-04-13 | End: 2022-04-13

## 2022-04-13 RX ORDER — DIPHENHYDRAMINE HCL 25 MG
25 CAPSULE ORAL
Status: DISCONTINUED | OUTPATIENT
Start: 2022-04-13 | End: 2022-04-14 | Stop reason: HOSPADM

## 2022-04-13 RX ORDER — METHYLPREDNISOLONE SODIUM SUCCINATE 125 MG/2ML
125 INJECTION, POWDER, LYOPHILIZED, FOR SOLUTION INTRAMUSCULAR; INTRAVENOUS
Status: DISCONTINUED | OUTPATIENT
Start: 2022-04-13 | End: 2022-04-14 | Stop reason: HOSPADM

## 2022-04-13 RX ADMIN — GADOBUTROL 7.5 ML: 604.72 INJECTION INTRAVENOUS at 14:15

## 2022-04-13 RX ADMIN — GADOBUTROL 7.5 ML: 604.72 INJECTION INTRAVENOUS at 14:14

## 2022-04-13 RX ADMIN — REGADENOSON 0.4 MG: 0.08 INJECTION, SOLUTION INTRAVENOUS at 13:48

## 2022-04-13 RX ADMIN — AMINOPHYLLINE 100 MG: 25 INJECTION, SOLUTION INTRAVENOUS at 13:55

## 2022-04-13 ASSESSMENT — PAIN SCALES - GENERAL: PAINLEVEL: NO PAIN (0)

## 2022-04-13 NOTE — NURSING NOTE
Transplant Coordinator Note    Reason for visit: 18th annual. 1st visit with Adult transplant team. Labs, cardiac MRI, clinic  Coordinator: Present     Health concerns addressed today:  1. Followed with ID for EBV viremia. Last seen 9/2021. Ebv pending. Today's level 300K.   2. stent placement in her coarctation on 8/14/2012  3. 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 Switch statin from simvistatin to crestor 10 mg  Magnesium 400 mg daily or a prenatal vitamin  5. Headaches?  6. Home BP's. Should have a home cuff now. Repeat /76  7. Depression/anxiety. Last saw psych 2016. Recently came off meds.   8. Known cav.Last angio~2012.  Next angio?  9. Continue with dental antibiotic due to stents.   10. Diet, good vitamin is necessary. Good protein. Ensure shakes.       Immunosuppressants:  Tac monotherapy 1 mg bid. Goal 5-8  Recent level 5.4. good 12 hour.       Routine screenings:    Derm: due, referral submitted.   Dental: Due 6/2020  Colonoscopy: 2012~dx with medication induced colitis. start at age 40  Breast: Start at age 40  Eye: up to date  Flu/Pneumonia: up to date   Covid: 2/2      Cardiac MRI, CXR, and resulted labs reviewed with patient  Medication record reviewed and reconciled  Questions and concerns addressed  Pt verbalized an understanding of plan of care.     Patient Instructions  1. Please see an OBGYN annually. Let me know if you need help scheduling.   2. I will send your tacro results over Dr. Scribblest.   3. I will touch base with the ID team to review your EBV level from today.  4. Please see a dentist.   5. Please call me with any questions or concerns!    Next transplant clinic appointment:  Next annual w/ angio/rhc/echo  Next lab draw: TBD, depending on today's labs.   Coordinator will call with all pending results.     Please call transplant coordinator with any questions:    Lubna Schwab RN BSN   Post Heart Transplant Nurse Coordinator  [facility]  Health  Questions: [phone]

## 2022-04-13 NOTE — LETTER
Date:August 24, 2022      Patient was self referred, no letter generated. Do not send.        [facility] Health Information

## 2022-04-13 NOTE — PATIENT INSTRUCTIONS
Patient Instructions  1. Please see an OBGYN annually. Let me know if you need help scheduling.   2. I will send your tacro results over [employer].   3. I will touch base with the ID team to review your EBV level from today.  4. Please see a dentist.   5. Please call me with any questions or concerns!    Next transplant clinic appointment:  Next annual w/ angio/rhc/echo  Next lab draw: TBD, depending on today's labs.   Coordinator will call with all pending results.     Please call transplant coordinator with any questions:    Lubna Schwab RN BSN   Post Heart Transplant Nurse Coordinator  [facility]  Questions: [phone]

## 2022-04-13 NOTE — NURSING NOTE
Chief Complaint   Patient presents with     Follow Up     Heart transplant      Vitals were taken and medications were reconciled.   JEFF De León  3:20 PM

## 2022-04-13 NOTE — LETTER
4/13/2022      RE: Laurel Santoyo  [address] [facility] [address] [facility] [address]       Dear Colleague,    Thank you for the opportunity to participate in the care of your patient, Laurel Santoyo, at the [facility] [place] at [facility]. Please see a copy of my visit note below.    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  She underwent heart cath in July 2011 for concern over aortic arch narrowing by echo, and heart cath was complicated by episode of complete heart block requiring cpr with spontaneous recovery of rhythm.  She was found to have both ascending aortic narrowing at anastomotic site as well as mild descending aortic narrowing.  She underwent stent placement in her coarctation on 8/14/2012, at which time she was also found to have mild coronary vasculopathy.  She was started on sirolimus (rapamune) on 8/28/12 because of the coronary findings, and once her level was therapeutic her tacrolimus level was discontinued.  She was admitted from clinic on 11/5/12 with 2 week history of diarrhea and weight loss, was incidentally found to have coarctation stent that migrated and was in abdominal aorta, and also during hospitalization was diagnosed as having likely drug-induced colitis (either from rapamune or cellcept).  She recovered well after stopping these medications and treating with tpn and antibiotics, and was discharged on 11/11/12.  Over the subsequent years, from a cardiac/transplant standpoint she did well.  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  She was last seen by Dr. Misty Linton in June  2021.    She presents today to transition care to the adult heart transplant program.  She reports that she has continued to feel well.  She denies any chest pain or pressure, sob/king, orthopnea, pnd, palpitations, syncope/presyncope, martínez, fever, chills, diarrhea, lymphadenopathy or change in exercise capacity.  She also denies any problems with her medications and reports compliance.        Past Cardiac/Transplant History:  1. 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 Original diagnosis: double inlet left ventricle, d-TGA                     1. S/p PA band and tricuspid valve repair (6/2000)                     2. Developed subaortic obstruction and underwent DKS, atrial septectomy, AV valve repair and BT shunt placement, postoperative ECMO (2/2001)                     3. Shunt revision and PA plasty with postoperative ECMO (8/2001)           B. Had ongoing moderate to severe AV valve regurgitation and was felt to be poor single ventricle                            candidate so was referred to transplant  2. 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 S/p resection (11/12/2008)  3. Aortic arch narrowing            A. S/p stent placement (8/14/2012), stent found on 11/5/12 to have migrated to abdominal aorta  4. 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 cellcept induced colitis (diagnosed 11/5/12), now resolved off cellcept.  6. Iron deficiency anemia  7. EBV viremia (diagnosed December 2019) without evidence of PTLD    Her PRA are as follows:  5/19/21: no donor specific antibodies  7/2/2020: no donor specific antibodies  12/26/2019: no donor specific antibodies  8/12/2019: donor specific antibodies to DR8 ()  3/4/19: donor specific antibodies to DR8 ()  8/16/18: donor specific antibodies to DR8 (MFI 1187)  3/5/18: donor specific antibodies to DR 8 ()  8/24/17: no donor specific antibodies  3/6/17: donor  "specific antibodies to DR4 (MFI 3707)  8/3/16: donor specific antibodies to DR4 (MFI 2756)  8/10/15: donor specific antibodies to DR4 (MFI 2785), DPB1*04:01 (MFI 1478)  2/12/15: donor specific antibodies to: DR4 tzg=3510, DPB1*04:01 ctv=856   8/18/14:  donor specific antibodies to DR4 with MFI 2325 (down from 2/13/14 MFI of 3184), DPB1*04:01 MFI 1537 and B44 [id number].       PAST MEDICAL HISTORY:  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       CURRENT MEDICATIONS:  amoxicillin (AMOXIL) 500 MG capsule, TAKE 4 CAPSULES BY MOUTH ONCE FOR 1 DOSE, 30-60 MINUTES PRIOR TO DENTAL CLEANING  aspirin EC 81 MG EC tablet, Take 1 tablet by mouth daily.  biotin 1000 MCG TABS tablet, Take 2,000 mcg by mouth daily  Magnesium 400 MG TABS, Take 400 mg by mouth daily    No current facility-administered medications on file prior to visit.      Exam:  /79 (BP Location: Left arm, Patient Position: Chair, Cuff Size: Adult Regular)   Pulse 103   Ht 1.651 m (5' 5\")   Wt 62.1 kg (137 lb)   SpO2 98%   BMI 22.80 kg/m    In general, the patient is a pleasant adult in no apparent distress.    HEENT: NC/AT. PERRLA. EOMI.  Sclerae white, not injected.    Neck:  No adenopathy, No thyromegaly.    COR: RRR.  Normal S1 S2 splits physiologically.  3/6 systolic murmur at LUSB, no diastolic murmur, rub click, or gallop.  JVP <10cm  Lungs:  CTA. No rhonchi.    Abdomen: soft, nontender, nondistended.  No organomegaly.  Extremities:  No clubbing, cyanosis, or edema.  Warm, well perfused.    Labs:  CBC RESULTS:   Lab Results   Component Value Date    WBC 7.2 04/21/2022    WBC 6.6 06/24/2021    RBC 4.45 04/21/2022    RBC 4.97 06/24/2021    HGB 12.0 " 04/21/2022    HGB 13.4 06/24/2021    HCT 37.5 04/21/2022    HCT 41.5 06/24/2021    MCV 84 04/21/2022    MCV 84 06/24/2021    MCH 27.0 04/21/2022    MCH 27.0 06/24/2021    MCHC 32.0 04/21/2022    MCHC 32.3 06/24/2021    RDW 14.0 04/21/2022    RDW 12.5 06/24/2021     04/21/2022     06/24/2021       BMP RESULTS:  Lab Results   Component Value Date     04/21/2022     06/24/2021    POTASSIUM 3.8 04/21/2022    POTASSIUM 4.1 06/24/2021    CHLORIDE 104 04/21/2022    CHLORIDE 107 06/24/2021    CO2 27 04/21/2022    CO2 21 06/24/2021    ANIONGAP 6 04/21/2022    ANIONGAP 9 06/24/2021    GLC 96 04/21/2022     (H) 06/24/2021    BUN 12 04/21/2022    BUN 14 06/24/2021    CR 0.75 04/21/2022    CR 0.91 06/24/2021    GFRESTIMATED >90 04/21/2022    GFRESTIMATED 90 06/24/2021    GFRESTBLACK >90 06/24/2021    CHICA 9.2 04/21/2022    CHICA 8.9 06/24/2021      LIPID RESULTS:  Lab Results   Component Value Date    CHOL 104 04/12/2022    CHOL 120 07/02/2020    HDL 53 04/12/2022    HDL 53 07/02/2020    LDL 36 04/12/2022    LDL 56 07/02/2020    TRIG 77 04/12/2022    TRIG 54 07/02/2020    CHOLHDLRATIO 2.4 08/18/2014    NHDL 51 04/12/2022    NHDL 67 07/02/2020       IMMUNOSUPPRESSANT LEVELS  Lab Results   Component Value Date    TACROL 5.4 04/12/2022    TACROL 7.8 06/24/2021    DOSTAC  04/12/2022      Comment:      Last dose information not provided.    DOSTAC 06/23 2115 06/24/2021    RAPAMY 8.8 11/08/2012       No components found for: CK  Lab Results   Component Value Date    MAG 1.5 (L) 04/12/2022    MAG 1.4 (L) 06/24/2021     Lab Results   Component Value Date    A1C 5.9 (H) 04/12/2022     Lab Results   Component Value Date    PHOS 3.8 04/12/2022    PHOS 3.1 06/24/2021       Diagnostic Studies:    CT July 2020   1. Postoperative changes of cardiac transplantation with stable caliber change at the aortic anastomosis.  2. Stable migrated aortic stent at the level of the celiac artery without associated  thrombus.  3. Stable emphysematous changes in the left lower lobe.  Proximal arch: 2.3 x 2.2 cm  Proximal arch at anastomotic site: 1.6 x 1.3 cm, grossly stable  Mid arch: 1.5 x 1.7 cm   Isthmus: 1.7 x 1.5 cm, previously 1.3 x 1.3 cm  Proximal descending aorta: 2.7 x 2.8 cm  Distal descending aorta: 1.9 x 1.9 cm    Echo 6/24/21  Patient after orthotopic heart transplant. (2/17/04) Normal right and left  ventricular systolic function. The calculated biplane left ventricular  ejection fraction is 65 %. LVRI 0.8. Trivial mitral valve insufficiency. Mild  aortic valve insufficiency. Stent in the abdominal aorta next to the celiac  artery. There is blunted systolic upstroke Doppler flow pattern with diastolic  run-off in the descending abdominal aorta. In the proximal descending thoracic  aorta there is a peak gradient of 45 mm Hg with a mean gradient of 11 mmHg. No  pericardial effusion.    CMR Report 4-  Clinical history: 21 year-old s/p OHT. Surveillance CMR.   1. The LV is normal in cavity size and wall thickness. The global systolic function is normal. The LVEF is  64%. There are no regional wall motion abnormalities.  2. The RV is normal in cavity size. The global systolic function is normal. The RVEF is 63%.   3. The left atrium is mildly dilated due to heart transplant.   4. Limited assessment of valve disease due to significant metal artifact.   5. Late gadolinium enhancement imaging shows no MI, fibrosis or infiltrative disease.   6. Regadenoson stress perfusion imaging shows no ischemia.  7. There is no pericardial effusion or thickening.  CONCLUSIONS: Normal LV and RV function, LVEF of 64% and RVEF of 63%. There is no evidence of ischemia on  stress perfusion imaging.       Assessment and Plan:  21 year old with a history of complex congenital heart disease s/p multiple palliative surgeries, who underwent orthotopic heart transplant on February 17, 2004 who then had subaortic stenosis in her  transplanted heart and underwent subaortic membrane resection on November 12, 2008.  She underwent heart cath in July 2011 for concern over aortic arch narrowing by echo, and heart cath was complicated by episode of complete heart block requiring cpr with spontaneous recovery of rhythm.  She was found to have both ascending aortic narrowing at anastomotic site as well as mild descending aortic narrowing.  She underwent stent placement in her coarctation on 8/14/2012, at which time she was also found to have mild coronary vasculopathy.  She was started on sirolimus (rapamune) on 8/28/12 because of the coronary findings, and once her level was therapeutic her tacrolimus level was discontinued.  She was admitted from clinic on 11/5/12 with 2 week history of diarrhea and weight loss, was incidentally found to have coarctation stent that migrated and was in abdominal aorta, and also during hospitalization was diagnosed as having likely drug-induced colitis (either from rapamune or cellcept).  She recovered well after stopping these medications and treating with tpn and antibiotics, and was discharged on 11/11/12.  Over the subsequent years, from a cardiac/transplant standpoint she did well.  She was diagnosed with EBV viremia in December 2019, and has had ongoing EBV Viremia who presents to transition care to the adult heart transplant program.        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 Original diagnosis: double inlet left ventricle, d-TGA                     1. S/p PA band and tricuspid valve repair (6/2000)                     2. Developed subaortic obstruction and underwent DKS, atrial septectomy, AV valve repair and BT shunt placement, postoperative ECMO (2/2001)                     3. Shunt revision and PA plasty with postoperative ECMO (8/2001)           B. 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 S/p resection (11/12/2008)  # Aortic arch narrowing            A. 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  # Her last biopsy was 7/11/11 and showed negative C3d/C4d staining, no evidence of rejection grade 0.    Pt is now 14 years out from orthotopic heart transplant and she continues to feel well.  Recent cardiac MRI confirms preserved biventricular function, no evidence of significant LVH or valvular disease and no evidence of ischemia.  She is euvolemic on exam today.    Immunosuppressants:  6/24/21:  Immuknow 376, no DSAs  On tacrolimus monotherapy given length from transplant and EBV viremia. Trough goal 5-8  Recent level 5.4. good 12 hour.   Continue tacrolimus 1 mg bid  Will follow-up today's Immuknow and DSA results    Graft function:  Cardiac MRI confirms normal biventricular size and function with no signficant LVH or valvular heart disease.  BP today midly elevated but repeat manual BP by me 122/76.  Continue to monitor and call clinic if BP consistently greater than 135/85.  Encouraged patient to continue regular aerobic exercise aiming for at least 150 minutes of moderate physical activity or 75 minutes of vigorous physical activity - or an equal combination of both - each week. and follow low-salt, heart healthy diet.    CAV prevention  Will change simvastatin to rosuvastatin 10mg daily.  Diet and exercise as above.    Routine screenings/health maintainence:    Derm: due, referral submitted.   Dental: Due 6/2020  Colonoscopy: 2012~dx with medication induced colitis. start at age 40  Breast: Start at age 40  Eye: up to date  Flu/Pneumonia: up to date   Covid: 2/2  Also discussed with patient about  pregnancy and risks associated with this.  Pt voiced no plans for pregnancy in the foreseen future.  Discussed that patient should not become pregnant while on statin therapy and should have preconception counseling prior to pursuing pregnancy.  Recommended annual GYN evaluation.  Pt voiced understanding.      # EBV viremia (diagnosed December 2019) without evidence of PTLD.  Remains asymptomatic.  Will follow-up today's level.  Continue to follow with transplant ID.        Follow-up:  Follow-up outstanding lab from results today to determine timing for repeat labs but at latest in 6 months.  RTC next year for 19th annual with angiogram, RHC, echo, labs and EKG.  Will be happy to see sooner if change in clinical status or new questions/concerns arise.      Simran Rao MD  Section Head - Advanced Heart Failure, Transplantation and Mechanical Circulatory Support  Director - Adult Congenital and Cardiovascular Genetics Center  Associate Professor of Medicine, [facility]    I spent a total of 40 minutes today in chart review as well as personally reviewing recent cardiac testing and/or laboratory results, today's history and examination, and discussion and counseling with the patient         Please do not hesitate to contact me if you have any questions/concerns.     Sincerely,     Simran Rao MD

## 2022-04-14 LAB
ATRIAL RATE - MUSE: 93 BPM
ATRIAL RATE - MUSE: 97 BPM
DIASTOLIC BLOOD PRESSURE - MUSE: NORMAL MMHG
DIASTOLIC BLOOD PRESSURE - MUSE: NORMAL MMHG
DONOR IDENTIFICATION: NORMAL
DSA COMMENTS: NORMAL
DSA PRESENT: NO
DSA TEST METHOD: NORMAL
IMMUKNOW IMMUNE CELL FUNCTION: 524 NG/ML
INTERPRETATION ECG - MUSE: NORMAL
INTERPRETATION ECG - MUSE: NORMAL
ORGAN: NORMAL
P AXIS - MUSE: 39 DEGREES
P AXIS - MUSE: 58 DEGREES
PR INTERVAL - MUSE: 124 MS
PR INTERVAL - MUSE: 126 MS
QRS DURATION - MUSE: 142 MS
QRS DURATION - MUSE: 142 MS
QT - MUSE: 388 MS
QT - MUSE: 396 MS
QTC - MUSE: 492 MS
QTC - MUSE: 492 MS
R AXIS - MUSE: 97 DEGREES
R AXIS - MUSE: 97 DEGREES
SA 1 CELL: NORMAL
SA 1 TEST METHOD: NORMAL
SA 2 CELL: NORMAL
SA 2 TEST METHOD: NORMAL
SA1 HI RISK ABY: NORMAL
SA1 MOD RISK ABY: NORMAL
SA2 HI RISK ABY: NORMAL
SA2 MOD RISK ABY: NORMAL
SYSTOLIC BLOOD PRESSURE - MUSE: NORMAL MMHG
SYSTOLIC BLOOD PRESSURE - MUSE: NORMAL MMHG
T AXIS - MUSE: -79 DEGREES
T AXIS - MUSE: -80 DEGREES
UNACCEPTABLE ANTIGENS: NORMAL
VENTRICULAR RATE- MUSE: 93 BPM
VENTRICULAR RATE- MUSE: 97 BPM
ZZZSA 1  COMMENTS: NORMAL
ZZZSA 2 COMMENTS: NORMAL

## 2022-04-15 NOTE — RESULT ENCOUNTER NOTE
Spoke to ID re: EBV 300K. Pt not experiencing any symptoms. Per ID, continue to monitor for symptoms, no follow up needed.

## 2022-04-18 ENCOUNTER — TELEPHONE (OUTPATIENT)
Dept: TRANSPLANT | Facility: CLINIC | Age: 22
End: 2022-04-18
Payer: COMMERCIAL

## 2022-04-18 NOTE — TELEPHONE ENCOUNTER
Call returned to patient. Pt states she has a sore back that radiates to stomach. Pt states no injury, it just came on while walking down the stairs. Pain is 4/10.  Pt hasn't tried any medication to help with discomfort. No diarrhea. No fever. No SOB. Pt did some stretching that didn't help. Pt just had cardiac appt and all testing WNL. Pt had family easter yesterday and is sad about her grandfather aging and having a heart procedure next week. She's unsure if this discomfort is related to anxiety. Pt has an appt on Thursday with a PMD to discuss anxiety. Pt encouraged to try tylenol, more stretching, and warm/cold pack. If pain persists, pt encouraged to be evaluated at urgent care or ER. Pt verbalized understanding and agrees with the plan.

## 2022-04-18 NOTE — TELEPHONE ENCOUNTER
Patient Call: General  Route to LPN    Reason for call: Pt c/o  Pain in back and chest  Hard to take deep breath  She seen Cardiology on Thurs and everything was OK  She is in the middle of finding a new PCP and we are they only one to think of to call       Call back needed? Yes    Return Call Needed  Same as documented in contacts section  When to return call?: Same day: Route High Priority

## 2022-04-21 ENCOUNTER — OFFICE VISIT (OUTPATIENT)
Dept: FAMILY MEDICINE | Facility: CLINIC | Age: 22
End: 2022-04-21
Attending: INTERNAL MEDICINE
Payer: COMMERCIAL

## 2022-04-21 VITALS
DIASTOLIC BLOOD PRESSURE: 70 MMHG | BODY MASS INDEX: 22.66 KG/M2 | SYSTOLIC BLOOD PRESSURE: 122 MMHG | HEART RATE: 105 BPM | WEIGHT: 136 LBS | OXYGEN SATURATION: 99 % | HEIGHT: 65 IN

## 2022-04-21 DIAGNOSIS — F33.2 SEVERE EPISODE OF RECURRENT MAJOR DEPRESSIVE DISORDER, WITHOUT PSYCHOTIC FEATURES (H): Primary | ICD-10-CM

## 2022-04-21 DIAGNOSIS — F43.10 PTSD (POST-TRAUMATIC STRESS DISORDER): ICD-10-CM

## 2022-04-21 DIAGNOSIS — Z94.1 TRANSPLANTED HEART (H): ICD-10-CM

## 2022-04-21 DIAGNOSIS — R10.32 LLQ ABDOMINAL PAIN: ICD-10-CM

## 2022-04-21 DIAGNOSIS — F41.1 GAD (GENERALIZED ANXIETY DISORDER): ICD-10-CM

## 2022-04-21 PROBLEM — I25.811: Status: ACTIVE | Noted: 2019-07-29

## 2022-04-21 PROBLEM — G47.00 INSOMNIA: Status: ACTIVE | Noted: 2019-10-10

## 2022-04-21 PROBLEM — Z87.19 HISTORY OF COLITIS: Status: ACTIVE | Noted: 2019-07-29

## 2022-04-21 LAB
ALBUMIN UR-MCNC: NEGATIVE MG/DL
APPEARANCE UR: CLEAR
BASOPHILS # BLD AUTO: 0 10E3/UL (ref 0–0.2)
BASOPHILS NFR BLD AUTO: 0 %
BILIRUB UR QL STRIP: NEGATIVE
CLUE CELLS: ABNORMAL
COLOR UR AUTO: YELLOW
EOSINOPHIL # BLD AUTO: 0.8 10E3/UL (ref 0–0.7)
EOSINOPHIL NFR BLD AUTO: 11 %
ERYTHROCYTE [DISTWIDTH] IN BLOOD BY AUTOMATED COUNT: 14 % (ref 10–15)
GLUCOSE UR STRIP-MCNC: NEGATIVE MG/DL
HCT VFR BLD AUTO: 37.5 % (ref 35–47)
HGB BLD-MCNC: 12 G/DL (ref 11.7–15.7)
HGB UR QL STRIP: NEGATIVE
KETONES UR STRIP-MCNC: NEGATIVE MG/DL
LEUKOCYTE ESTERASE UR QL STRIP: NEGATIVE
LYMPHOCYTES # BLD AUTO: 1.4 10E3/UL (ref 0.8–5.3)
LYMPHOCYTES NFR BLD AUTO: 19 %
MCH RBC QN AUTO: 27 PG (ref 26.5–33)
MCHC RBC AUTO-ENTMCNC: 32 G/DL (ref 31.5–36.5)
MCV RBC AUTO: 84 FL (ref 78–100)
MONOCYTES # BLD AUTO: 0.9 10E3/UL (ref 0–1.3)
MONOCYTES NFR BLD AUTO: 12 %
NEUTROPHILS # BLD AUTO: 4.2 10E3/UL (ref 1.6–8.3)
NEUTROPHILS NFR BLD AUTO: 58 %
NITRATE UR QL: NEGATIVE
PH UR STRIP: 7 [PH] (ref 5–7)
PLATELET # BLD AUTO: 233 10E3/UL (ref 150–450)
RBC # BLD AUTO: 4.45 10E6/UL (ref 3.8–5.2)
SP GR UR STRIP: 1.02 (ref 1–1.03)
TRICHOMONAS, WET PREP: ABNORMAL
UROBILINOGEN UR STRIP-ACNC: 0.2 E.U./DL
WBC # BLD AUTO: 7.2 10E3/UL (ref 4–11)
WBC'S/HIGH POWER FIELD, WET PREP: ABNORMAL
YEAST, WET PREP: ABNORMAL

## 2022-04-21 PROCEDURE — 99204 OFFICE O/P NEW MOD 45 MIN: CPT | Performed by: STUDENT IN AN ORGANIZED HEALTH CARE EDUCATION/TRAINING PROGRAM

## 2022-04-21 PROCEDURE — 80053 COMPREHEN METABOLIC PANEL: CPT | Performed by: STUDENT IN AN ORGANIZED HEALTH CARE EDUCATION/TRAINING PROGRAM

## 2022-04-21 PROCEDURE — 81003 URINALYSIS AUTO W/O SCOPE: CPT | Performed by: STUDENT IN AN ORGANIZED HEALTH CARE EDUCATION/TRAINING PROGRAM

## 2022-04-21 PROCEDURE — 87210 SMEAR WET MOUNT SALINE/INK: CPT | Performed by: STUDENT IN AN ORGANIZED HEALTH CARE EDUCATION/TRAINING PROGRAM

## 2022-04-21 PROCEDURE — 36415 COLL VENOUS BLD VENIPUNCTURE: CPT | Performed by: STUDENT IN AN ORGANIZED HEALTH CARE EDUCATION/TRAINING PROGRAM

## 2022-04-21 PROCEDURE — 85025 COMPLETE CBC W/AUTO DIFF WBC: CPT | Performed by: STUDENT IN AN ORGANIZED HEALTH CARE EDUCATION/TRAINING PROGRAM

## 2022-04-21 ASSESSMENT — ENCOUNTER SYMPTOMS
SHORTNESS OF BREATH: 0
ARTHRALGIAS: 1
JOINT SWELLING: 0
WEAKNESS: 0
FEVER: 0
HEADACHES: 0
HEMATURIA: 0
HEARTBURN: 0
MYALGIAS: 1
HEMATOCHEZIA: 0
ABDOMINAL PAIN: 1
PALPITATIONS: 0
DIARRHEA: 0
CONSTIPATION: 0
COUGH: 1
DIZZINESS: 0
EYE PAIN: 0
PARESTHESIAS: 0
DYSURIA: 0
NERVOUS/ANXIOUS: 1
SORE THROAT: 0
NAUSEA: 0
CHILLS: 0
FREQUENCY: 0
BREAST MASS: 0
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
## 2022-04-21 NOTE — PROGRESS NOTES
{PROVIDER CHARTING PREFERENCE:054995}    Subjective   Laurel is a 21 year old who presents for the following health issues {ACCOMPANIED BY STATEMENT (Optional):002714}    HPI         {SUPERLIST (Optional):845461}  {additonal problems for provider to add (Optional):595340}    Review of Systems   {ROS COMP (Optional):604013}      Objective    There were no vitals taken for this visit.  There is no height or weight on file to calculate BMI.  Physical Exam   {Exam List (Optional):751273}    {Diagnostic Test Results (Optional):577538}    {AMBULATORY ATTESTATION (Optional):380620}

## 2022-04-21 NOTE — PATIENT INSTRUCTIONS
Please call to schedule your ultrasound  at: [phone]       Xray, labs and urine today     Increase fluids, try prune/apple juice. Can do a 1/2 capful of miralax

## 2022-04-22 LAB
ALBUMIN SERPL-MCNC: 3.9 G/DL (ref 3.4–5)
ALP SERPL-CCNC: 71 U/L (ref 40–150)
ALT SERPL W P-5'-P-CCNC: 18 U/L (ref 0–50)
ANION GAP SERPL CALCULATED.3IONS-SCNC: 6 MMOL/L (ref 3–14)
AST SERPL W P-5'-P-CCNC: 14 U/L (ref 0–45)
BILIRUB SERPL-MCNC: 0.4 MG/DL (ref 0.2–1.3)
BUN SERPL-MCNC: 12 MG/DL (ref 7–30)
CALCIUM SERPL-MCNC: 9.2 MG/DL (ref 8.5–10.1)
CHLORIDE BLD-SCNC: 104 MMOL/L (ref 94–109)
CO2 SERPL-SCNC: 27 MMOL/L (ref 20–32)
CREAT SERPL-MCNC: 0.75 MG/DL (ref 0.52–1.04)
GFR SERPL CREATININE-BSD FRML MDRD: >90 ML/MIN/1.73M2
GLUCOSE BLD-MCNC: 96 MG/DL (ref 70–99)
POTASSIUM BLD-SCNC: 3.8 MMOL/L (ref 3.4–5.3)
PROT SERPL-MCNC: 8.6 G/DL (ref 6.8–8.8)
SODIUM SERPL-SCNC: 137 MMOL/L (ref 133–144)

## 2022-05-19 DIAGNOSIS — Z94.1 HEART TRANSPLANTED (H): ICD-10-CM

## 2022-05-19 NOTE — TELEPHONE ENCOUNTER
Medication Requested: LISINOPRIL 10MG TABLETS  Directions: TAKE ONE TABLET BY MOUTH ONCE DAILY  Quantity:90   Last Office Visit: 4/21/22  Next Appointment Scheduled for: 6/15/22  Last refill: 1/19/22  Sent To:  RN

## 2022-05-20 RX ORDER — LISINOPRIL 10 MG/1
10 TABLET ORAL DAILY
Qty: 90 TABLET | Refills: 3 | OUTPATIENT
Start: 2022-05-20

## 2022-05-24 DIAGNOSIS — Z94.1 HEART TRANSPLANTED (H): ICD-10-CM

## 2022-05-25 RX ORDER — LISINOPRIL 10 MG/1
10 TABLET ORAL DAILY
Qty: 90 TABLET | Refills: 3 | Status: SHIPPED | OUTPATIENT
Start: 2022-05-25 | End: 2023-01-01

## 2022-06-15 ENCOUNTER — OFFICE VISIT (OUTPATIENT)
Dept: DERMATOLOGY | Facility: CLINIC | Age: 22
End: 2022-06-15
Attending: INTERNAL MEDICINE
Payer: COMMERCIAL

## 2022-06-15 ENCOUNTER — OFFICE VISIT (OUTPATIENT)
Dept: OBGYN | Facility: CLINIC | Age: 22
End: 2022-06-15
Attending: OBSTETRICS & GYNECOLOGY
Payer: COMMERCIAL

## 2022-06-15 VITALS
DIASTOLIC BLOOD PRESSURE: 80 MMHG | WEIGHT: 136.3 LBS | HEIGHT: 65 IN | BODY MASS INDEX: 22.71 KG/M2 | HEART RATE: 108 BPM | SYSTOLIC BLOOD PRESSURE: 159 MMHG

## 2022-06-15 DIAGNOSIS — Z80.8 FAMILY HISTORY OF SKIN CANCER: ICD-10-CM

## 2022-06-15 DIAGNOSIS — D22.9 MULTIPLE BENIGN NEVI: ICD-10-CM

## 2022-06-15 DIAGNOSIS — L85.8 KP (KERATOSIS PILARIS): ICD-10-CM

## 2022-06-15 DIAGNOSIS — Z94.1 TRANSPLANTED HEART (H): Primary | ICD-10-CM

## 2022-06-15 DIAGNOSIS — Z30.011 ENCOUNTER FOR INITIAL PRESCRIPTION OF CONTRACEPTIVE PILLS: Primary | ICD-10-CM

## 2022-06-15 DIAGNOSIS — L81.4 LENTIGINES: ICD-10-CM

## 2022-06-15 PROCEDURE — 99203 OFFICE O/P NEW LOW 30 MIN: CPT | Performed by: DERMATOLOGY

## 2022-06-15 PROCEDURE — 99204 OFFICE O/P NEW MOD 45 MIN: CPT | Performed by: OBSTETRICS & GYNECOLOGY

## 2022-06-15 PROCEDURE — G0463 HOSPITAL OUTPT CLINIC VISIT: HCPCS

## 2022-06-15 RX ORDER — CHLORAL HYDRATE 500 MG
2 CAPSULE ORAL DAILY
COMMUNITY
End: 2022-10-03

## 2022-06-15 ASSESSMENT — ANXIETY QUESTIONNAIRES
5. BEING SO RESTLESS THAT IT IS HARD TO SIT STILL: NEARLY EVERY DAY
6. BECOMING EASILY ANNOYED OR IRRITABLE: SEVERAL DAYS
GAD7 TOTAL SCORE: 18
2. NOT BEING ABLE TO STOP OR CONTROL WORRYING: NEARLY EVERY DAY
3. WORRYING TOO MUCH ABOUT DIFFERENT THINGS: NEARLY EVERY DAY
GAD7 TOTAL SCORE: 18
7. FEELING AFRAID AS IF SOMETHING AWFUL MIGHT HAPPEN: MORE THAN HALF THE DAYS
1. FEELING NERVOUS, ANXIOUS, OR ON EDGE: NEARLY EVERY DAY

## 2022-06-15 ASSESSMENT — PAIN SCALES - GENERAL: PAINLEVEL: NO PAIN (0)

## 2022-06-15 ASSESSMENT — PATIENT HEALTH QUESTIONNAIRE - PHQ9: 5. POOR APPETITE OR OVEREATING: NEARLY EVERY DAY

## 2022-06-15 NOTE — PROGRESS NOTES
[facility] Dermatology  High Risk Non-Melanoma Skin Cancer Clinic Initial Consult Note  Encounter Date: Pastor 15, 2022  Office Visit     Dermatology Problem List:  1. Hx of heart transplant 2/17/2004 for congenital heart disease.   ____________________________________________    Assessment & Plan:     # History of  Heart solid organ transplantation in the setting of congenital heart disease.    - We reviewed with the patient that due to the immunosuppressive medications used following transplant, solid organ transplant recipients are at a roughly 65-fold increased risk of squamous cell carcinoma, 20-fold increased risk of basal cell carcinoma, and 3.4 fold increased risk of melanoma compared to the general population.   - Based on the patient's risk factors and the Skin and Ultraviolet Neoplasia Transplant Risk Assessment Tool, the patient's risk of skin cancer following transplant is categorized as:  - Medium Risk: 5-Year Risk of 6.15% and 10-Year Risk of 13.73%  - We briefly reviewed prevention methods for reducing skin cancer after transplantation including avoidance of sun exposure, avoidance of indoor tanning beds or other indoor tanning devices, use of protective clothing (e.g. wide-brimmed hats, long sleeves, long pants), SPF 30 or greater sunscreen, and UV-protective sunglasses when outdoors.   - We discussed self skin examinations and partner-skin examinations.     # Benign lesions: Multiple benign nevi, solar lentigos, nevus spilus    Explained to patient benign nature of lesion. No treatment is necessary at this time unless the lesion changes or becomes symptomatic.   - ABCDs of melanoma were discussed and self skin checks were advised.  - Sun precaution was advised including the use of sun screens of SPF 30 or higher, sun protective clothing, and avoidance of tanning beds.    # Keratin pilaris. Minor problem.   - Ammonium lactate, amlactin which will help break down the keratin around  the hair follicles to smooth out the skin    Procedures Performed:   None.    Follow-up: 1 year(s) in-person, or earlier for new or changing lesions    Staff and Scribe:      Scribe Disclosure:  I, Gracie Rothaurelio, am serving as a scribe to document services personally performed by Rolando Ramirez MD based on data collection and the provider's statements to me.     Provider Disclosure:   The documentation recorded by the scribe accurately reflects the services I personally performed and the decisions made by me.    Rolando Ramirez MD    Department of Dermatology  [facility]: Phone: [phone], Fax:[phone]  [facility]: Phone: [phone] Fax: [phone]  ____________________________________________    CC: Skin Check (Laurel is here today for FBSE, reports dry skin)      HPI:    Laurel Santoyo is a(n) 21 year old adult who presents today as a new patient for skin check. He has a history of heart transplant in setting of congenital heart disease.  She applies spf 15 to her face and spf 50 to the rest of her body.     Patient is otherwise feeling well, without additional skin concerns.    Type of Organ Transplant: Heart  Reason for Transplant: Congenital heart disease  Date of Transplant: 2/17/2004  Immunosuppression Regimen: tacrolimus    Personal History of Skin Cancer:  Every been diagnosed with the following:   IF YES, (if known) indicate type, body location, year diagnosed, and treatment.    - Pre-skin cancers (e.g. actinic keratoses): NO  - Atypical nevi: NO  - Keratinocyte carcinomas (basal cell carcinoma or squamous cell carcinoma): NO  - Melanoma: NO  - Other type(s) of skin cancer: NO     Family History of Skin Cancer:  Any first-degree relative relative(s) diagnosed with the following:  IF YES, (if known) indicate relationship, type, and age at diagnosis.     -  Keratinocyte carcinomas (basal cell carcinoma or squamous cell carcinoma): UNKNOWN  - Melanoma: UNKNOWN  - Other type of skin cancer: UNKNOWN    Skin Cancer Review of Systems:  - Fair skin (Wilde Type I or II): YES  - Blue, green or allyson eyes: YES allyson  - Light or red hair: YES  - Male sex assigned at birth: NO  - History of blistering sunburns: NO  - Use of indoor tanning devices or sunlamps: No   - If YES, indoor tanning device or sunlamp?   - If YES, approximate number of lifetime sessions:   - If YES, were these used before the age of 35?:  - Smoking History: No  - If YES, what is the pack-year smoking history?  - Ever lived in a sub-tropical region? No  - Any history of arsenic exposure (e.g. well water)? No  - Any history of viral warts? No  - Have you received the HPV vaccine? YES  - Any occupational exposure to ultraviolet light exposure (e.g. construction work, agricultural work, ): No   - If YES, what was the profession?     Medication Review:  Is the patient currently taking any of the following medications?   IF YES, indicate which medication (if necessary).     - TNF-alpha inhibitor (e.g. etanercept, adalimumab, infliximab): NO  - Tetracycline antibiotic (e.g. minocycline, doxycycline, tetracycline): NO  - Thiazide-containing anti-hypertensive (e.g. hydrochlorothiazide): NO  - Angiotensin receptor blocker (e.g. losartan): NO  - Sulfonylurea (e.g. glipizide): NO  - Amiodarone: NO  - Diltiazem: NO  - Voriconazole: NO     Labs Reviewed:  N/A    Physical Exam:  Vitals: There were no vitals taken for this visit.  SKIN: Total skin including the undergarment areas was performed. The exam included the head/face, neck, both arms, chest, back, abdomen, both legs, digits and/or nails.   - Brown patch with numerous scattered central hyperpigmented macules on the central paraspinal back.  - Follicular based hyperkeratotic papules on bilateral lateral upper extremities.  - Well circumscribed  macules with symmetric color distribution on trunk and extremities.  - Hopkins WD smooth macules on face, neck, trunk, and extremities.  - No other lesions of concern on areas examined.     Medications:  Current Outpatient Medications   Medication     amoxicillin (AMOXIL) 500 MG capsule     aspirin EC 81 MG EC tablet     biotin 1000 MCG TABS tablet     fish oil-omega-3 fatty acids 1000 MG capsule     lisinopril (ZESTRIL) 10 MG tablet     Magnesium 400 MG TABS     rosuvastatin (CRESTOR) 10 MG tablet     sertraline (ZOLOFT) 50 MG tablet     tacrolimus (GENERIC EQUIVALENT) 0.5 MG capsule     vitamin D3 (CHOLECALCIFEROL) 50 mcg (2000 units) tablet     No current facility-administered medications for this visit.      Past Medical History:   Patient Active Problem List   Diagnosis     IMMUNIZATION HISTORY     **Need for SBE (subacute bacterial endocarditis) prophylaxis     Heart replaced by transplant (H)     Vaccine contraindicated due to immunosuppression - NO LIVE VACCINES/ **NO MMR, NO VARICELLA, NO LIVE INFLUENZA**     PTSD (post-traumatic stress disorder)     Severe episode of recurrent major depressive disorder, without psychotic features (H)     Insomnia     History of colitis     Coronary artery disease involving transplanted heart     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     s/p heart transplant 2/2004 7/12/2011       CC Simran Rao MD  [address]  [place] [address] on close of this encounter.

## 2022-06-15 NOTE — PATIENT INSTRUCTIONS
Patient Education     Checking for Skin Cancer  You can find cancer early by checking your skin each month. There are 3 kinds of skin cancer. They are melanoma, basal cell carcinoma, and squamous cell carcinoma. Doing monthly skin checks is the best way to find new marks or skin changes. Follow the instructions below for checking your skin.   The ABCDEs of checking moles for melanoma   Check your moles or growths for signs of melanoma using ABCDE:   Asymmetry: the sides of the mole or growth don t match  Border: the edges are ragged, notched, or blurred  Color: the color within the mole or growth varies  Diameter: the mole or growth is larger than 6 mm (size of a pencil eraser)  Evolving: the size, shape, or color of the mole or growth is changing (evolving is not shown in the images below)    Checking for other types of skin cancer  Basal cell carcinoma or squamous cell carcinoma have symptoms such as:     A spot or mole that looks different from all other marks on your skin  Changes in how an area feels, such as itching, tenderness, or pain  Changes in the skin's surface, such as oozing, bleeding, or scaliness  A sore that does not heal  New swelling or redness beyond the border of a mole    Who s at risk?  Anyone can get skin cancer. But you are at greater risk if you have:   Fair skin, light-colored hair, or light-colored eyes  Many moles or abnormal moles on your skin  A history of sunburns from sunlight or tanning beds  A family history of skin cancer  A history of exposure to radiation or chemicals  A weakened immune system  If you have had skin cancer in the past, you are at risk for recurring skin cancer.   How to check your skin  Do your monthly skin checkups in front of a full-length mirror. Check all parts of your body, including your:   Head (ears, face, neck, and scalp)  Torso (front, back, and sides)  Arms (tops, undersides, upper, and lower armpits)  Hands (palms, backs, and fingers, including  under the nails)  Buttocks and genitals  Legs (front, back, and sides)  Feet (tops, soles, toes, including under the nails, and between toes)  If you have a lot of moles, take digital photos of them each month. Make sure to take photos both up close and from a distance. These can help you see if any moles change over time.   Most skin changes are not cancer. But if you see any changes in your skin, call your doctor right away. Only he or she can diagnose a problem. If you have skin cancer, seeing your doctor can be the first step toward getting the treatment that could save your life.   [email or web address] last reviewed this educational content on 4/1/2019 2000-2020 [employer]. [address] [place] [address], [place] [address]. All rights reserved. This information is not intended as a substitute for professional medical care. Always follow your healthcare professional's instructions.       When should I call my doctor?  If you are worsening or not improving, please, contact us or seek urgent care as noted below.     Who should I call with questions (adults)?  [facility] (adult and pediatric): [phone]  [facility] (adult): [phone]  For urgent needs outside of business hours call the [facility] [[email or web address]] at [phone] and ask for the dermatology resident on call to be paged  If this is a medical emergency and you are unable to reach an ER, Call [phone]    Who should I call with questions (pediatric)?  [facility]- Pediatric Dermatology  Dr. Johanny Bridges, Dr. Gina Dubose, Dr. Susan Romano, IVONNE Putnam, Dr. Ruth Billingsley, Dr. Ramya Camp & Dr. Garland Cosme  Non-urgent nurse triage line; [phone]- Sylvia and Kellie HOPPER Care Coordinatorkelly Cruz (/Complex ) [phone]    If you need a prescription refill, please contact your pharmacy. Refills are approved or denied by our  Physicians during normal business hours, Monday through Fridays  Per office policy, refills will not be granted if you have not been seen within the past year (or sooner depending on your child's condition)    Scheduling Information:  Pediatric Appointment Scheduling and Call Center [phone]  Radiology Scheduling- [phone]  Sedation Unit Scheduling- [phone]  [facility] Scheduling- General [phone]; Pediatric Dermatology [phone]  Main  Services: [phone]  Slovak: [phone]  Portuguese: [phone]  Hmong/Montserratian/Georgian: [phone]  Preadmission Nursing Department Fax Number: [phone] (Fax all pre-operative paperwork to this number)    For urgent matters arising during evenings, weekends, or holidays that cannot wait for normal business hours please call [phone] and ask for the dermatology resident on call to be paged.

## 2022-06-15 NOTE — LETTER
Date:June 29, 2022      Provider requested that no letter be sent. Do not send.       [facility]

## 2022-06-15 NOTE — LETTER
"6/15/2022       RE: Laurel Santoyo  [address] [facility] [address]     Dear Colleague,    Thank you for referring your patient, Laurel Santoyo, to the [facility] [place] at [facility]. Please see a copy of my visit note below.    SUBJECTIVE   Laurel \"Nanette\" Hayde Santoyo is a 21 year old, G0, with PMH s/p cardiac transplant in 2004, MDD, anxiety, hypertension, and gender dysphoria, here to establish care.     This is Nanette's first gynecology appointment. They have recently begun a new relationship with a male and are seeking alternative contraception to current use of condoms. They would also like to improve management of worsening depression and anxiety symptoms around their cycle, as well as worsened gender dysphoria associated with menstruation.     Their PHQ-9 score today is 11, MORGAN 7 is 16. They report chronic anxiety and depression with a recent episode of worsening symptoms, which is now improving. They have worked with therapy for mental health care and have established prescription for sertraline with PCP Dr. Ray.     Their cardiac medication regimen is managed by Dr. Rao, and their chronic hypertension is managed with lisinopril, also monitored by Dr. Rao.    They are a college student, currently at [employer] studying arts, transferring to [employer] in the fall to study gender and women's studies, planning to become a child life specialist.      Gynecologic History  Menstrual History:  Menstrual History 4/24/2017 4/25/2018 12/13/2019   LAST MENSTRUAL PERIOD 4/18/2017 4/2/2018 12/10/2019     Current contraception: condoms  Number of partners in last year: 1    No results found for: PAP (never had pap)  History of abnormal Pap smear: No  HPV vaccine up to date   Menstrual history: menarche at age 14; cycles have been regular lasting ~30d with 4-5d of bleeding. Over the past 6 months they have " had worse bleeding and cramping on day 1 of the cycle, which is attributed to a stressful living situation that has recently resolved. They have associated worsening of mood symptoms and dysphoria associated with menstruation, as noted.     Obstetric History  OB History    Para Term  AB Living   0 0 0 0 0 0   SAB IAB Ectopic Multiple Live Births   0 0 0 0 0      Past Medical History  Past Medical History:   Diagnosis Date     Heart transplant, orthotopic, status  2012     HLHS (hypoplastic left heart syndrome) 2007     HLHS (hypoplastic left heart syndrome) 2007    Hx of Heart Transplant in 2004 at age 3 years 2008 sub-aortic membrane removal      PONV (postoperative nausea and vomiting)      Post-operative aortic arch obstruction 2011     s/p heart transplant 2011     Past Surgical History  Past Surgical History:   Procedure Laterality Date     ANGIOGRAPHY  2012    Procedure: ANGIOGRAPHY;;  Surgeon: Melanie Arias MD;  [facility]     HEART CATH CHILD  2011    Procedure:HEART CATH CHILD; Right Left, Biopsy and Possible Coarct Stent; Surgeon:MELANIE ARIAS; [facility]     HEART CATH CHILD  2012    Procedure: HEART CATH CHILD;  Left Heart Cath, Coronary Angiogram with Possible Balloon Dilatation of Aorta;  Surgeon: Melanie Arias MD;  [facility]     INSERT PICC LINE  2012    Procedure: INSERT PICC LINE;  Picc Line Placement;  Surgeon: ANNA Bailey MD;  [facility]     ZZC TRANSPLANTATION OF HEART  2004       Medications  Current Outpatient Medications   Medication     aspirin EC 81 MG EC tablet     biotin 1000 MCG TABS tablet     fish oil-omega-3 fatty acids 1000 MG capsule     lisinopril (ZESTRIL) 10 MG tablet     Magnesium 400 MG TABS     rosuvastatin (CRESTOR) 10 MG tablet     sertraline (ZOLOFT) 50 MG tablet     tacrolimus (GENERIC EQUIVALENT) 0.5 MG capsule     vitamin D3  "(CHOLECALCIFEROL) 50 mcg (2000 units) tablet     amoxicillin (AMOXIL) 500 MG capsule     No current facility-administered medications for this visit.       Allergies   No Known Allergies    Social History  Social History     Tobacco Use     Smoking status: Never Smoker     Smokeless tobacco: Never Used   Vaping Use     Vaping Use: Never used   Substance Use Topics     Alcohol use: Yes     Comment: Seldom/Occ     Drug use: Never        Family History  Family History   Problem Relation Age of Onset     Other - See Comments Mother 18        migraine headaches     Other - See Comments Maternal Grandfather         mental health     Skin Cancer Paternal Grandmother      Melanoma No family hx of      Review of Systems  CONSTITUTIONAL: NEGATIVE for fever, chills  EYES: NEGATIVE for vision changes   RESP: NEGATIVE for significant cough or SOB  CV: NEGATIVE for chest pain, palpitations   GI: NEGATIVE for nausea, abdominal pain, heartburn, or change in bowel habits  : NEGATIVE for frequency, dysuria, or hematuria  MUSCULOSKELETAL: NEGATIVE for significant arthralgias or myalgia  NEURO: NEGATIVE for weakness, dizziness or paresthesias or headache    OBJECTIVE   BP (!) 159/80 (BP Location: Left arm, Patient Position: Chair)   Pulse 108   Ht 1.651 m (5' 5\")   Wt 61.8 kg (136 lb 4.8 oz)   BMI 22.68 kg/m    BMI: Body mass index is 22.68 kg/m .    General:  Alert, no distress   Head:  Normocephalic, without obvious abnormality   Extremities:  normal     ?   ASSESSMENT   Laurel \"Nanette\" Hayde Santoyo is a 21 year old, G0, with PMH s/p cardiac transplant in 2004, MDD, anxiety, hypertension, and gender dysphoria, here to establish care and for contraceptive counseling.     PLAN     -- Contraception - Options discussed with patient, given HTN and cardiac history, recommended progestin only methods if acceptable to the patient [POPs, DMPA, IUD (LNG, copper), Implant (Nexplanon)]. Emergency contraception (Plan B/Felicia/copper IUD) " discussed. The patient plans POP, ordered to pharmacy.     -- Health maintenance: discussed plan to do BE/PE, pap, at an upcoming visit; acknowledged and discussed the complexity of this with gender dysphoria and commitment to be responsive to patient's concerns    RTC 3 months, review contraception, consider annual exam    Susana Wagner   MS3    I appreciate the note above by medical student, Susana Wagner.  I was present with the medical student who participated in the service and in the documentation of the note. I have verified the history and personally performed the physical exam and medical decision making. I agree with the assessment and plan of care as documented in the note.  45 minutes spent on the date of the encounter doing chart review (previous clinic visits, hospital records, lab results, imaging studies, and procedural documentation) and the patient's history and exam, documentation and further activities as noted above.    Doris Crowder MD MPH        Again, thank you for allowing me to participate in the care of your patient.      Sincerely,    Francisco Crowder MD

## 2022-06-15 NOTE — LETTER
6/15/2022       RE: Laurel Santoyo  [address] [facility] [address]     Dear Colleague,    Thank you for referring your patient, Laurel Santoyo, to the [facility] [place] at [facility]. Please see a copy of my visit note below.    [facility] Dermatology  High Risk Non-Melanoma Skin Cancer Clinic Initial Consult Note  Encounter Date: Pastor 15, 2022  Office Visit     Dermatology Problem List:  1. Hx of heart transplant 2/17/2004 for congenital heart disease.   ____________________________________________    Assessment & Plan:     # History of  Heart solid organ transplantation in the setting of congenital heart disease.    - We reviewed with the patient that due to the immunosuppressive medications used following transplant, solid organ transplant recipients are at a roughly 65-fold increased risk of squamous cell carcinoma, 20-fold increased risk of basal cell carcinoma, and 3.4 fold increased risk of melanoma compared to the general population.   - Based on the patient's risk factors and the Skin and Ultraviolet Neoplasia Transplant Risk Assessment Tool, the patient's risk of skin cancer following transplant is categorized as:  - Medium Risk: 5-Year Risk of 6.15% and 10-Year Risk of 13.73%  - We briefly reviewed prevention methods for reducing skin cancer after transplantation including avoidance of sun exposure, avoidance of indoor tanning beds or other indoor tanning devices, use of protective clothing (e.g. wide-brimmed hats, long sleeves, long pants), SPF 30 or greater sunscreen, and UV-protective sunglasses when outdoors.   - We discussed self skin examinations and partner-skin examinations.     # Benign lesions: Multiple benign nevi, solar lentigos, nevus spilus    Explained to patient benign nature of lesion. No treatment is necessary at this time unless the lesion changes or becomes symptomatic.    - ABCDs of melanoma were discussed and self skin checks were advised.  - Sun precaution was advised including the use of sun screens of SPF 30 or higher, sun protective clothing, and avoidance of tanning beds.    # Keratin pilaris. Minor problem.   - Ammonium lactate, amlactin which will help break down the keratin around the hair follicles to smooth out the skin    Procedures Performed:   None.    Follow-up: 1 year(s) in-person, or earlier for new or changing lesions    Staff and Scribe:      Scribe Disclosure:  I, Gracie Sexton, am serving as a scribe to document services personally performed by Rolando Ramirez MD based on data collection and the provider's statements to me.     Provider Disclosure:   The documentation recorded by the scribe accurately reflects the services I personally performed and the decisions made by me.    Rolando Ramirez MD    Department of Dermatology  [facility]: Phone: [phone], Fax:[phone]  [facility]: Phone: [phone] Fax: [phone]  ____________________________________________    CC: Skin Check (Laurel is here today for FBSE, reports dry skin)      HPI:    Laurel Santoyo is a(n) 21 year old adult who presents today as a new patient for skin check. He has a history of heart transplant in setting of congenital heart disease.  She applies spf 15 to her face and spf 50 to the rest of her body.     Patient is otherwise feeling well, without additional skin concerns.    Type of Organ Transplant: Heart  Reason for Transplant: Congenital heart disease  Date of Transplant: 2/17/2004  Immunosuppression Regimen: tacrolimus    Personal History of Skin Cancer:  Every been diagnosed with the following:   IF YES, (if known) indicate type, body location, year diagnosed, and treatment.    - Pre-skin cancers (e.g. actinic keratoses): NO  - Atypical  nevi: NO  - Keratinocyte carcinomas (basal cell carcinoma or squamous cell carcinoma): NO  - Melanoma: NO  - Other type(s) of skin cancer: NO     Family History of Skin Cancer:  Any first-degree relative relative(s) diagnosed with the following:  IF YES, (if known) indicate relationship, type, and age at diagnosis.     - Keratinocyte carcinomas (basal cell carcinoma or squamous cell carcinoma): UNKNOWN  - Melanoma: UNKNOWN  - Other type of skin cancer: UNKNOWN    Skin Cancer Review of Systems:  - Fair skin (Wilde Type I or II): YES  - Blue, green or allyson eyes: YES allyson  - Light or red hair: YES  - Male sex assigned at birth: NO  - History of blistering sunburns: NO  - Use of indoor tanning devices or sunlamps: No   - If YES, indoor tanning device or sunlamp?   - If YES, approximate number of lifetime sessions:   - If YES, were these used before the age of 35?:  - Smoking History: No  - If YES, what is the pack-year smoking history?  - Ever lived in a sub-tropical region? No  - Any history of arsenic exposure (e.g. well water)? No  - Any history of viral warts? No  - Have you received the HPV vaccine? YES  - Any occupational exposure to ultraviolet light exposure (e.g. construction work, agricultural work, ): No   - If YES, what was the profession?     Medication Review:  Is the patient currently taking any of the following medications?   IF YES, indicate which medication (if necessary).     - TNF-alpha inhibitor (e.g. etanercept, adalimumab, infliximab): NO  - Tetracycline antibiotic (e.g. minocycline, doxycycline, tetracycline): NO  - Thiazide-containing anti-hypertensive (e.g. hydrochlorothiazide): NO  - Angiotensin receptor blocker (e.g. losartan): NO  - Sulfonylurea (e.g. glipizide): NO  - Amiodarone: NO  - Diltiazem: NO  - Voriconazole: NO     Labs Reviewed:  N/A    Physical Exam:  Vitals: There were no vitals taken for this visit.  SKIN: Total skin including the undergarment areas was  performed. The exam included the head/face, neck, both arms, chest, back, abdomen, both legs, digits and/or nails.   - Brown patch with numerous scattered central hyperpigmented macules on the central paraspinal back.  - Follicular based hyperkeratotic papules on bilateral lateral upper extremities.  - Well circumscribed macules with symmetric color distribution on trunk and extremities.  - Hopkins WD smooth macules on face, neck, trunk, and extremities.  - No other lesions of concern on areas examined.     Medications:  Current Outpatient Medications   Medication     amoxicillin (AMOXIL) 500 MG capsule     aspirin EC 81 MG EC tablet     biotin 1000 MCG TABS tablet     fish oil-omega-3 fatty acids 1000 MG capsule     lisinopril (ZESTRIL) 10 MG tablet     Magnesium 400 MG TABS     rosuvastatin (CRESTOR) 10 MG tablet     sertraline (ZOLOFT) 50 MG tablet     tacrolimus (GENERIC EQUIVALENT) 0.5 MG capsule     vitamin D3 (CHOLECALCIFEROL) 50 mcg (2000 units) tablet     No current facility-administered medications for this visit.      Past Medical History:   Patient Active Problem List   Diagnosis     IMMUNIZATION HISTORY     **Need for SBE (subacute bacterial endocarditis) prophylaxis     Heart replaced by transplant (H)     Vaccine contraindicated due to immunosuppression - NO LIVE VACCINES/ **NO MMR, NO VARICELLA, NO LIVE INFLUENZA**     PTSD (post-traumatic stress disorder)     Severe episode of recurrent major depressive disorder, without psychotic features (H)     Insomnia     History of colitis     Coronary artery disease involving transplanted heart     Past Medical History:   Diagnosis Date     Heart transplant, orthotopic, status  2/2004 8/14/2012     HLHS (hypoplastic left heart syndrome) 8/22/2007     HLHS (hypoplastic left heart syndrome) 8/22/2007    Hx of Heart Transplant in 2/2004 at age 3 years Nov, 2008 sub-aortic membrane removal      PONV (postoperative nausea and vomiting)      Post-operative aortic arch  obstruction 7/12/2011     s/p heart transplant 2/2004 7/12/2011       CC Simran Rao MD  [address] [facility] [address]  [place] [address] on close of this encounter.

## 2022-06-15 NOTE — PROGRESS NOTES
"SUBJECTIVE   Laurel \"Nanette\" Hayde Santoyo is a 21 year old, G0, with PMH s/p cardiac transplant in 2004, MDD, anxiety, hypertension, and gender dysphoria, here to establish care.     This is Nanette's first gynecology appointment. They have recently begun a new relationship with a male and are seeking alternative contraception to current use of condoms. They would also like to improve management of worsening depression and anxiety symptoms around their cycle, as well as worsened gender dysphoria associated with menstruation.     Their PHQ-9 score today is 11, MORGAN 7 is 16. They report chronic anxiety and depression with a recent episode of worsening symptoms, which is now improving. They have worked with therapy for mental health care and have established prescription for sertraline with PCP Dr. Ray.     Their cardiac medication regimen is managed by Dr. Rao, and their chronic hypertension is managed with lisinopril, also monitored by Dr. Rao.    They are a college student, currently at [employer] studying arts, transferring to [employer] in the fall to study gender and women's studies, planning to become a child life specialist.      Gynecologic History  Menstrual History:  Menstrual History 2019   LAST MENSTRUAL PERIOD 2017 2018 12/10/2019     Current contraception: condoms  Number of partners in last year: 1    No results found for: PAP (never had pap)  History of abnormal Pap smear: No  HPV vaccine up to date   Menstrual history: menarche at age 14; cycles have been regular lasting ~30d with 4-5d of bleeding. Over the past 6 months they have had worse bleeding and cramping on day 1 of the cycle, which is attributed to a stressful living situation that has recently resolved. They have associated worsening of mood symptoms and dysphoria associated with menstruation, as noted.     Obstetric History  OB History    Para Term  AB Living   0 0 0 0 0 " 0   SAB IAB Ectopic Multiple Live Births   0 0 0 0 0      Past Medical History  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     s/p heart transplant 2/2004 7/12/2011     Past Surgical History  Past Surgical History:   Procedure Laterality Date     ANGIOGRAPHY  8/14/2012    Procedure: ANGIOGRAPHY;;  Surgeon: Melanie Arias MD;  [facility]     HEART CATH CHILD  7/11/2011    Procedure:HEART CATH CHILD; Right Left, Biopsy and Possible Coarct Stent; Surgeon:MELANIE ARIAS; [facility]     HEART CATH CHILD  8/14/2012    Procedure: HEART CATH CHILD;  Left Heart Cath, Coronary Angiogram with Possible Balloon Dilatation of Aorta;  Surgeon: Melanie Arias MD;  [facility]     INSERT PICC LINE  11/7/2012    Procedure: INSERT PICC LINE;  Picc Line Placement;  Surgeon: ANNA Bailey MD;  [facility]     ZZC TRANSPLANTATION OF HEART  2/2004       Medications  Current Outpatient Medications   Medication     aspirin EC 81 MG EC tablet     biotin 1000 MCG TABS tablet     fish oil-omega-3 fatty acids 1000 MG capsule     lisinopril (ZESTRIL) 10 MG tablet     Magnesium 400 MG TABS     rosuvastatin (CRESTOR) 10 MG tablet     sertraline (ZOLOFT) 50 MG tablet     tacrolimus (GENERIC EQUIVALENT) 0.5 MG capsule     vitamin D3 (CHOLECALCIFEROL) 50 mcg (2000 units) tablet     amoxicillin (AMOXIL) 500 MG capsule     No current facility-administered medications for this visit.       Allergies   No Known Allergies    Social History  Social History     Tobacco Use     Smoking status: Never Smoker     Smokeless tobacco: Never Used   Vaping Use      "Vaping Use: Never used   Substance Use Topics     Alcohol use: Yes     Comment: Seldom/Occ     Drug use: Never        Family History  Family History   Problem Relation Age of Onset     Other - See Comments Mother 18        migraine headaches     Other - See Comments Maternal Grandfather         mental health     Skin Cancer Paternal Grandmother      Melanoma No family hx of      Review of Systems  CONSTITUTIONAL: NEGATIVE for fever, chills  EYES: NEGATIVE for vision changes   RESP: NEGATIVE for significant cough or SOB  CV: NEGATIVE for chest pain, palpitations   GI: NEGATIVE for nausea, abdominal pain, heartburn, or change in bowel habits  : NEGATIVE for frequency, dysuria, or hematuria  MUSCULOSKELETAL: NEGATIVE for significant arthralgias or myalgia  NEURO: NEGATIVE for weakness, dizziness or paresthesias or headache    OBJECTIVE   BP (!) 159/80 (BP Location: Left arm, Patient Position: Chair)   Pulse 108   Ht 1.651 m (5' 5\")   Wt 61.8 kg (136 lb 4.8 oz)   BMI 22.68 kg/m    BMI: Body mass index is 22.68 kg/m .    General:  Alert, no distress   Head:  Normocephalic, without obvious abnormality   Extremities:  normal     ?   ASSESSMENT   Laurel \"Nanette\" Hayde Santoyo is a 21 year old, G0, with PMH s/p cardiac transplant in 2004, MDD, anxiety, hypertension, and gender dysphoria, here to establish care and for contraceptive counseling.     PLAN     -- Contraception - Options discussed with patient, given HTN and cardiac history, recommended progestin only methods if acceptable to the patient [POPs, DMPA, IUD (LNG, copper), Implant (Nexplanon)]. Emergency contraception (Plan B/Felicia/copper IUD) discussed. The patient plans POP, ordered to pharmacy.     -- Health maintenance: discussed plan to do BE/PE, pap, at an upcoming visit; acknowledged and discussed the complexity of this with gender dysphoria and commitment to be responsive to patient's concerns    RTC 3 months, review contraception, consider annual " exam    Susana Wagner   MS3    I appreciate the note above by medical student, Susana Wagner.  I was present with the medical student who participated in the service and in the documentation of the note. I have verified the history and personally performed the physical exam and medical decision making. I agree with the assessment and plan of care as documented in the note.  45 minutes spent on the date of the encounter doing chart review (previous clinic visits, hospital records, lab results, imaging studies, and procedural documentation) and the patient's history and exam, documentation and further activities as noted above.    Doris Crowder MD MPH

## 2022-06-15 NOTE — NURSING NOTE
Dermatology Rooming Note    Laurel Santoyo's goals for this visit include:   Chief Complaint   Patient presents with     Skin Check     Laurel is here today for FBSE, reports dry skin     Julissa Caro, EMT

## 2022-06-27 ENCOUNTER — MYC MEDICAL ADVICE (OUTPATIENT)
Dept: FAMILY MEDICINE | Facility: CLINIC | Age: 22
End: 2022-06-27

## 2022-06-28 NOTE — TELEPHONE ENCOUNTER
Routing to Dr. Ray- luis chan    Do you know of an antigen injection that pts that are immunocompromise can receive?     Would I refer her to Infectious disease?    Raissa Wick RN

## 2022-06-28 NOTE — TELEPHONE ENCOUNTER
RN replied to patient via [employer]. See message for details.     Alex Guerin RN, BSN, PHN  [facility]: [place]

## 2022-06-28 NOTE — TELEPHONE ENCOUNTER
I believe she is talking about Evusheld, which is a monoclonal antibody treatment used for pre-exposure prophylaxis in immunocompromised individuals. There is not an antigen prophylaxis that I am aware of. Below is information on how to schedule, etc. I don't believe we have it at our specific clinic, but the number below should be able to schedule her where it is available. She can get tetanus booster with us. She is due for her actual covid booster vaccine as well and if she would like to have that done with her tetanus, then she would need to wait 2 weeks after to have the Evusheld.     Also, she is due for follow up on her mental health. This can be in person or virtual.     Mariia Ray, DO     -----------------------------    [facility] is happy to invite you to schedule an appointment to get a medication that can prevent COVID-19 infection.  This medication is called Evusheld (tixagevimab/cilgavimab) and is given with two injections.  It provides artificial antibodies to protect patients whose own immune systems may not be able to respond to the COVID-19 vaccination.  It has been approved under emergency use authorization (EUA).  You can read more details about it here.  If you have gotten a COVID-19 vaccine recently, you need to wait until 2 weeks after your dose to get the Evusheld injection.    We are working hard to ethically distribute this scarce resource.  The first category we are focusing on is patients who have the following health issues:    Any solid organ transplant recipient.    Any solid tumor, on active myelosuppressive chemotherapy.    Allogeneic stem cell transplant within the past two years or still on immunosuppressive therapy.    Autologous stem cell transplant within the past two years or still on immunosuppressive therapy.    B-cell malignancies, on active treatment (e.g., B-cell lymphomas, chronic lymphocytic leukemia, acute B-cell lymphoblastic leukemia, etc.).    Acute myeloid  leukemia under active treatment.    Multiple myeloma, on active treatment or maintenance therapy.    Primary or secondary T-cell immunodeficiency, including severe combined immunodeficiency.    Receipt of the following immunosuppressive medication within the past 12 months (including for solid organ transplant).  o Anti-thymocyte globulin (ATG)  o Alemtuzumab  o Anti-B-cell therapy (e.g., rituximab)    Receipt of anti-CD19 or anti-BCMA (CAR)-T-cell immunotherapy, within six months of treatment.    Additional pediatric conditions to be considered as Category 1 (age 12-17):Combined immune deficiencies with or without immune dysregulation (e.g., APDS, STAT3 GOF, ALPS).  o Primary immune regulatory disorders with or without immune deficiency (e.g., APECED, XIAP).  o High-risk or relapsed acute lymphoblastic leukemia/lymphoblastic lymphoma on intensive therapy (not maintenance therapy).      You can read more on the [facility] Website.    The medication is given as two injections into the muscle and patients need to stay for 60 minutes after the injection to watch for reactions.    You can call us at [phone] to schedule this appointment. Please let us know if you have any questions or concerns.      Mariia Ray, DO

## 2022-07-01 ENCOUNTER — OFFICE VISIT (OUTPATIENT)
Dept: FAMILY MEDICINE | Facility: CLINIC | Age: 22
End: 2022-07-01
Payer: COMMERCIAL

## 2022-07-01 VITALS
HEART RATE: 96 BPM | BODY MASS INDEX: 22.82 KG/M2 | TEMPERATURE: 98.3 F | OXYGEN SATURATION: 98 % | WEIGHT: 137 LBS | SYSTOLIC BLOOD PRESSURE: 100 MMHG | DIASTOLIC BLOOD PRESSURE: 70 MMHG | HEIGHT: 65 IN

## 2022-07-01 DIAGNOSIS — F43.10 PTSD (POST-TRAUMATIC STRESS DISORDER): ICD-10-CM

## 2022-07-01 DIAGNOSIS — M25.561 ACUTE PAIN OF RIGHT KNEE: ICD-10-CM

## 2022-07-01 DIAGNOSIS — Z23 HIGH PRIORITY FOR 2019-NCOV VACCINE: ICD-10-CM

## 2022-07-01 DIAGNOSIS — D84.9 IMMUNOCOMPROMISED PATIENT (H): ICD-10-CM

## 2022-07-01 DIAGNOSIS — F33.2 SEVERE EPISODE OF RECURRENT MAJOR DEPRESSIVE DISORDER, WITHOUT PSYCHOTIC FEATURES (H): Primary | ICD-10-CM

## 2022-07-01 DIAGNOSIS — S76.311A HAMSTRING MUSCLE STRAIN, RIGHT, INITIAL ENCOUNTER: ICD-10-CM

## 2022-07-01 DIAGNOSIS — F41.1 GAD (GENERALIZED ANXIETY DISORDER): ICD-10-CM

## 2022-07-01 PROCEDURE — 91306 COVID-19,PF,MODERNA (18+ YRS BOOSTER .25ML): CPT | Performed by: STUDENT IN AN ORGANIZED HEALTH CARE EDUCATION/TRAINING PROGRAM

## 2022-07-01 PROCEDURE — 0064A COVID-19,PF,MODERNA (18+ YRS BOOSTER .25ML): CPT | Performed by: STUDENT IN AN ORGANIZED HEALTH CARE EDUCATION/TRAINING PROGRAM

## 2022-07-01 PROCEDURE — 90471 IMMUNIZATION ADMIN: CPT | Performed by: STUDENT IN AN ORGANIZED HEALTH CARE EDUCATION/TRAINING PROGRAM

## 2022-07-01 PROCEDURE — 99214 OFFICE O/P EST MOD 30 MIN: CPT | Mod: 25 | Performed by: STUDENT IN AN ORGANIZED HEALTH CARE EDUCATION/TRAINING PROGRAM

## 2022-07-01 PROCEDURE — 90715 TDAP VACCINE 7 YRS/> IM: CPT | Performed by: STUDENT IN AN ORGANIZED HEALTH CARE EDUCATION/TRAINING PROGRAM

## 2022-07-01 ASSESSMENT — ANXIETY QUESTIONNAIRES
GAD7 TOTAL SCORE: 13
8. IF YOU CHECKED OFF ANY PROBLEMS, HOW DIFFICULT HAVE THESE MADE IT FOR YOU TO DO YOUR WORK, TAKE CARE OF THINGS AT HOME, OR GET ALONG WITH OTHER PEOPLE?: VERY DIFFICULT
6. BECOMING EASILY ANNOYED OR IRRITABLE: SEVERAL DAYS
7. FEELING AFRAID AS IF SOMETHING AWFUL MIGHT HAPPEN: MORE THAN HALF THE DAYS
4. TROUBLE RELAXING: MORE THAN HALF THE DAYS
1. FEELING NERVOUS, ANXIOUS, OR ON EDGE: MORE THAN HALF THE DAYS
2. NOT BEING ABLE TO STOP OR CONTROL WORRYING: MORE THAN HALF THE DAYS
3. WORRYING TOO MUCH ABOUT DIFFERENT THINGS: MORE THAN HALF THE DAYS
GAD7 TOTAL SCORE: 13
7. FEELING AFRAID AS IF SOMETHING AWFUL MIGHT HAPPEN: MORE THAN HALF THE DAYS
5. BEING SO RESTLESS THAT IT IS HARD TO SIT STILL: MORE THAN HALF THE DAYS
GAD7 TOTAL SCORE: 13

## 2022-07-01 ASSESSMENT — PATIENT HEALTH QUESTIONNAIRE - PHQ9
SUM OF ALL RESPONSES TO PHQ QUESTIONS 1-9: 5
SUM OF ALL RESPONSES TO PHQ QUESTIONS 1-9: 5
10. IF YOU CHECKED OFF ANY PROBLEMS, HOW DIFFICULT HAVE THESE PROBLEMS MADE IT FOR YOU TO DO YOUR WORK, TAKE CARE OF THINGS AT HOME, OR GET ALONG WITH OTHER PEOPLE: SOMEWHAT DIFFICULT
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
## 2022-07-01 NOTE — PATIENT INSTRUCTIONS
[facility] is happy to invite you to schedule an appointment to get a medication that can prevent COVID-19 infection.  This medication is called Evusheld (tixagevimab/cilgavimab) and is given with two injections.  It provides artificial antibodies to protect patients whose own immune systems may not be able to respond to the COVID-19 vaccination.  It has been approved under emergency use authorization (EUA).  You can read more details about it here.  If you have gotten a COVID-19 vaccine recently, you need to wait until 2 weeks after your dose to get the Evusheld injection.    We are working hard to ethically distribute this scarce resource.  The first category we are focusing on is patients who have the following health issues:  Any solid organ transplant recipient.  Any solid tumor, on active myelosuppressive chemotherapy.  Allogeneic stem cell transplant within the past two years or still on immunosuppressive therapy.  Autologous stem cell transplant within the past two years or still on immunosuppressive therapy.  B-cell malignancies, on active treatment (e.g., B-cell lymphomas, chronic lymphocytic leukemia, acute B-cell lymphoblastic leukemia, etc.).  Acute myeloid leukemia under active treatment.  Multiple myeloma, on active treatment or maintenance therapy.  Primary or secondary T-cell immunodeficiency, including severe combined immunodeficiency.  Receipt of the following immunosuppressive medication within the past 12 months (including for solid organ transplant).  Anti-thymocyte globulin (ATG)  Alemtuzumab  Anti-B-cell therapy (e.g., rituximab)  Receipt of anti-CD19 or anti-BCMA (CAR)-T-cell immunotherapy, within six months of treatment.  Additional pediatric conditions to be considered as Category 1 (age 12-17):Combined immune deficiencies with or without immune dysregulation (e.g., APDS, STAT3 GOF, ALPS).  Primary immune regulatory disorders with or without immune deficiency (e.g., APECED,  XIAP).  High-risk or relapsed acute lymphoblastic leukemia/lymphoblastic lymphoma on intensive therapy (not maintenance therapy).      You can read more on the [facility] Website.    The medication is given as two injections into the muscle and patients need to stay for 60 minutes after the injection to watch for reactions.    You can call us at [phone] to schedule this appointment. Please let us know if you have any questions or concerns.      Mariia Ray, DO

## 2022-07-12 ENCOUNTER — MYC MEDICAL ADVICE (OUTPATIENT)
Dept: FAMILY MEDICINE | Facility: CLINIC | Age: 22
End: 2022-07-12

## 2022-07-26 DIAGNOSIS — Z94.1 HEART TRANSPLANTED (H): ICD-10-CM

## 2022-07-26 DIAGNOSIS — Z94.1 HEART REPLACED BY TRANSPLANT (H): ICD-10-CM

## 2022-07-26 RX ORDER — TACROLIMUS 0.5 MG/1
CAPSULE ORAL
Qty: 120 CAPSULE | Refills: 11 | Status: SHIPPED | OUTPATIENT
Start: 2022-07-26 | End: 2023-01-01

## 2022-07-27 RX ORDER — ROSUVASTATIN CALCIUM 10 MG/1
10 TABLET, COATED ORAL DAILY
Qty: 90 TABLET | Refills: 3 | Status: SHIPPED | OUTPATIENT
Start: 2022-07-27 | End: 2023-01-01

## 2022-07-30 NOTE — PROGRESS NOTES
I called and left a message with both Laurel and Js. Laurel's hemoglobin is low at 7.2 and her iron stores are quite low. We would like her to start taking 325 mg ferrous sulfate twice a day. WE will recheck her hemoglobin in about 4-6 weeks depending on her schedule for going to college. I asked them to call back with questions.    Problem: Nutrition/Hydration-ADULT  Goal: Nutrient/Hydration intake appropriate for improving, restoring or maintaining nutritional needs  Description: Monitor and assess patient's nutrition/hydration status for malnutrition  Collaborate with interdisciplinary team and initiate plan and interventions as ordered  Monitor patient's weight and dietary intake as ordered or per policy  Utilize nutrition screening tool and intervene as necessary  Determine patient's food preferences and provide high-protein, high-caloric foods as appropriate       INTERVENTIONS:  - Monitor oral intake, urinary output, labs, and treatment plans  - Assess nutrition and hydration status and recommend course of action  - Evaluate amount of meals eaten  - Assist patient with eating if necessary   - Allow adequate time for meals  - Recommend/ encourage appropriate diets, oral nutritional supplements, and vitamin/mineral supplements  - Order, calculate, and assess calorie counts as needed  - Recommend, monitor, and adjust tube feedings and TPN/PPN based on assessed needs  - Assess need for intravenous fluids  - Provide specific nutrition/hydration education as appropriate  - Include patient/family/caregiver in decisions related to nutrition  Outcome: Adequate for Discharge

## 2022-08-01 DIAGNOSIS — Z94.1 HEART REPLACED BY TRANSPLANT (H): ICD-10-CM

## 2022-08-01 DIAGNOSIS — E55.9 VITAMIN D INSUFFICIENCY: ICD-10-CM

## 2022-08-02 RX ORDER — CHOLECALCIFEROL (VITAMIN D3) 50 MCG
50 TABLET ORAL DAILY
Qty: 90 TABLET | Refills: 3 | Status: SHIPPED | OUTPATIENT
Start: 2022-08-02 | End: 2023-01-01

## 2022-08-20 ENCOUNTER — HEALTH MAINTENANCE LETTER (OUTPATIENT)
Age: 22
End: 2022-08-20

## 2022-08-23 NOTE — PROGRESS NOTES
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  She underwent heart cath in July 2011 for concern over aortic arch narrowing by echo, and heart cath was complicated by episode of complete heart block requiring cpr with spontaneous recovery of rhythm.  She was found to have both ascending aortic narrowing at anastomotic site as well as mild descending aortic narrowing.  She underwent stent placement in her coarctation on 8/14/2012, at which time she was also found to have mild coronary vasculopathy.  She was started on sirolimus (rapamune) on 8/28/12 because of the coronary findings, and once her level was therapeutic her tacrolimus level was discontinued.  She was admitted from clinic on 11/5/12 with 2 week history of diarrhea and weight loss, was incidentally found to have coarctation stent that migrated and was in abdominal aorta, and also during hospitalization was diagnosed as having likely drug-induced colitis (either from rapamune or cellcept).  She recovered well after stopping these medications and treating with tpn and antibiotics, and was discharged on 11/11/12.  Over the subsequent years, from a cardiac/transplant standpoint she did well.  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  She was last seen by Dr. Misty Linton in June 2021.    She presents today to transition care to the adult heart transplant program.  She reports that she has continued to feel well.  She denies any chest pain or pressure, sob/king, orthopnea, pnd, palpitations, syncope/presyncope, martínez, fever, chills, diarrhea, lymphadenopathy or change in exercise capacity.  She also denies any problems with her  medications and reports compliance.        Past Cardiac/Transplant History:  1. 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 Original diagnosis: double inlet left ventricle, d-TGA                     1. S/p PA band and tricuspid valve repair (6/2000)                     2. Developed subaortic obstruction and underwent DKS, atrial septectomy, AV valve repair and BT shunt placement, postoperative ECMO (2/2001)                     3. Shunt revision and PA plasty with postoperative ECMO (8/2001)           B. Had ongoing moderate to severe AV valve regurgitation and was felt to be poor single ventricle                            candidate so was referred to transplant  2. 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 S/p resection (11/12/2008)  3. Aortic arch narrowing            A. S/p stent placement (8/14/2012), stent found on 11/5/12 to have migrated to abdominal aorta  4. 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 cellcept induced colitis (diagnosed 11/5/12), now resolved off cellcept.  6. Iron deficiency anemia  7. EBV viremia (diagnosed December 2019) without evidence of PTLD    Her PRA are as follows:  5/19/21: no donor specific antibodies  7/2/2020: no donor specific antibodies  12/26/2019: no donor specific antibodies  8/12/2019: donor specific antibodies to DR8 ()  3/4/19: donor specific antibodies to DR8 ()  8/16/18: donor specific antibodies to DR8 (MFI 1187)  3/5/18: donor specific antibodies to DR 8 ()  8/24/17: no donor specific antibodies  3/6/17: donor specific antibodies to DR4 (MFI 3707)  8/3/16: donor specific antibodies to DR4 (MFI 2756)  8/10/15: donor specific antibodies to DR4 (MFI 2785), DPB1*04:01 (MFI 1478)  2/12/15: donor specific antibodies to: DR4 ebw=4082, DPB1*04:01 avb=573   8/18/14:  donor specific antibodies to DR4 with MFI 2325 (down from 2/13/14 MFI of 3184), DPB1*04:01 MFI 1537 and  "B44 APY233.       PAST MEDICAL HISTORY:  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       CURRENT MEDICATIONS:  amoxicillin (AMOXIL) 500 MG capsule, TAKE 4 CAPSULES BY MOUTH ONCE FOR 1 DOSE, 30-60 MINUTES PRIOR TO DENTAL CLEANING  aspirin EC 81 MG EC tablet, Take 1 tablet by mouth daily.  biotin 1000 MCG TABS tablet, Take 2,000 mcg by mouth daily  Magnesium 400 MG TABS, Take 400 mg by mouth daily    No current facility-administered medications on file prior to visit.      Exam:  /79 (BP Location: Left arm, Patient Position: Chair, Cuff Size: Adult Regular)   Pulse 103   Ht 1.651 m (5' 5\")   Wt 62.1 kg (137 lb)   SpO2 98%   BMI 22.80 kg/m    In general, the patient is a pleasant adult in no apparent distress.    HEENT: NC/AT. PERRLA. EOMI.  Sclerae white, not injected.    Neck:  No adenopathy, No thyromegaly.    COR: RRR.  Normal S1 S2 splits physiologically.  3/6 systolic murmur at LUSB, no diastolic murmur, rub click, or gallop.  JVP <10cm  Lungs:  CTA. No rhonchi.    Abdomen: soft, nontender, nondistended.  No organomegaly.  Extremities:  No clubbing, cyanosis, or edema.  Warm, well perfused.    Labs:  CBC RESULTS:   Lab Results   Component Value Date    WBC 7.2 04/21/2022    WBC 6.6 06/24/2021    RBC 4.45 04/21/2022    RBC 4.97 06/24/2021    HGB 12.0 04/21/2022    HGB 13.4 06/24/2021    HCT 37.5 04/21/2022    HCT 41.5 06/24/2021    MCV 84 04/21/2022    MCV 84 06/24/2021    MCH 27.0 04/21/2022    MCH 27.0 06/24/2021    MCHC 32.0 04/21/2022    MCHC 32.3 06/24/2021    RDW 14.0 04/21/2022    RDW 12.5 06/24/2021     04/21/2022     06/24/2021       BMP RESULTS:  Lab Results   Component Value Date    "  04/21/2022     06/24/2021    POTASSIUM 3.8 04/21/2022    POTASSIUM 4.1 06/24/2021    CHLORIDE 104 04/21/2022    CHLORIDE 107 06/24/2021    CO2 27 04/21/2022    CO2 21 06/24/2021    ANIONGAP 6 04/21/2022    ANIONGAP 9 06/24/2021    GLC 96 04/21/2022     (H) 06/24/2021    BUN 12 04/21/2022    BUN 14 06/24/2021    CR 0.75 04/21/2022    CR 0.91 06/24/2021    GFRESTIMATED >90 04/21/2022    GFRESTIMATED 90 06/24/2021    GFRESTBLACK >90 06/24/2021    CHICA 9.2 04/21/2022    CHICA 8.9 06/24/2021      LIPID RESULTS:  Lab Results   Component Value Date    CHOL 104 04/12/2022    CHOL 120 07/02/2020    HDL 53 04/12/2022    HDL 53 07/02/2020    LDL 36 04/12/2022    LDL 56 07/02/2020    TRIG 77 04/12/2022    TRIG 54 07/02/2020    CHOLHDLRATIO 2.4 08/18/2014    NHDL 51 04/12/2022    NHDL 67 07/02/2020       IMMUNOSUPPRESSANT LEVELS  Lab Results   Component Value Date    TACROL 5.4 04/12/2022    TACROL 7.8 06/24/2021    DOSTAC  04/12/2022      Comment:      Last dose information not provided.    DOSTAC 06/23 2115 06/24/2021    RAPAMY 8.8 11/08/2012       No components found for: CK  Lab Results   Component Value Date    MAG 1.5 (L) 04/12/2022    MAG 1.4 (L) 06/24/2021     Lab Results   Component Value Date    A1C 5.9 (H) 04/12/2022     Lab Results   Component Value Date    PHOS 3.8 04/12/2022    PHOS 3.1 06/24/2021       Diagnostic Studies:    CT July 2020   1. Postoperative changes of cardiac transplantation with stable caliber change at the aortic anastomosis.  2. Stable migrated aortic stent at the level of the celiac artery without associated thrombus.  3. Stable emphysematous changes in the left lower lobe.  Proximal arch: 2.3 x 2.2 cm  Proximal arch at anastomotic site: 1.6 x 1.3 cm, grossly stable  Mid arch: 1.5 x 1.7 cm   Isthmus: 1.7 x 1.5 cm, previously 1.3 x 1.3 cm  Proximal descending aorta: 2.7 x 2.8 cm  Distal descending aorta: 1.9 x 1.9 cm    Echo 6/24/21  Patient after orthotopic heart transplant.  (2/17/04) Normal right and left  ventricular systolic function. The calculated biplane left ventricular  ejection fraction is 65 %. LVRI 0.8. Trivial mitral valve insufficiency. Mild  aortic valve insufficiency. Stent in the abdominal aorta next to the celiac  artery. There is blunted systolic upstroke Doppler flow pattern with diastolic  run-off in the descending abdominal aorta. In the proximal descending thoracic  aorta there is a peak gradient of 45 mm Hg with a mean gradient of 11 mmHg. No  pericardial effusion.    CMR Report 4-  Clinical history: 21 year-old s/p OHT. Surveillance CMR.   1. The LV is normal in cavity size and wall thickness. The global systolic function is normal. The LVEF is  64%. There are no regional wall motion abnormalities.  2. The RV is normal in cavity size. The global systolic function is normal. The RVEF is 63%.   3. The left atrium is mildly dilated due to heart transplant.   4. Limited assessment of valve disease due to significant metal artifact.   5. Late gadolinium enhancement imaging shows no MI, fibrosis or infiltrative disease.   6. Regadenoson stress perfusion imaging shows no ischemia.  7. There is no pericardial effusion or thickening.  CONCLUSIONS: Normal LV and RV function, LVEF of 64% and RVEF of 63%. There is no evidence of ischemia on  stress perfusion imaging.       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  She underwent heart cath in July 2011 for concern over aortic arch narrowing by echo, and heart cath was complicated by episode of complete heart block requiring cpr with spontaneous recovery of rhythm.  She was found to have both ascending aortic narrowing at anastomotic site as well as mild  descending aortic narrowing.  She underwent stent placement in her coarctation on 8/14/2012, at which time she was also found to have mild coronary vasculopathy.  She was started on sirolimus (rapamune) on 8/28/12 because of the coronary findings, and once her level was therapeutic her tacrolimus level was discontinued.  She was admitted from clinic on 11/5/12 with 2 week history of diarrhea and weight loss, was incidentally found to have coarctation stent that migrated and was in abdominal aorta, and also during hospitalization was diagnosed as having likely drug-induced colitis (either from rapamune or cellcept).  She recovered well after stopping these medications and treating with tpn and antibiotics, and was discharged on 11/11/12.  Over the subsequent years, from a cardiac/transplant standpoint she did well.  She was diagnosed with EBV viremia in December 2019, and has had ongoing EBV Viremia who presents to transition care to the adult heart transplant program.        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 Original diagnosis: double inlet left ventricle, d-TGA                     1. S/p PA band and tricuspid valve repair (6/2000)                     2. Developed subaortic obstruction and underwent DKS, atrial septectomy, AV valve repair and BT shunt placement, postoperative ECMO (2/2001)                     3. Shunt revision and PA plasty with postoperative ECMO (8/2001)           B. Had ongoing moderate to severe AV valve regurgitation and was felt to be poor single ventricle                            candidate so was referred to transplant  # Subaortic obstruction in transplanted heart            A. S/p resection (11/12/2008)  # Aortic arch narrowing            A. S/p stent placement (8/14/2012), stent found on 11/5/12 to have migrated to abdominal aorta  # Early coronary vasculopathy (diagnosed by cath on 8/14/2012)  # Cellcept induced colitis  (diagnosed 11/5/12), now resolved off cellcept.  # Her last biopsy was 7/11/11 and showed negative C3d/C4d staining, no evidence of rejection grade 0.    Pt is now 14 years out from orthotopic heart transplant and she continues to feel well.  Recent cardiac MRI confirms preserved biventricular function, no evidence of significant LVH or valvular disease and no evidence of ischemia.  She is euvolemic on exam today.    Immunosuppressants:  6/24/21:  Immuknow 376, no DSAs  On tacrolimus monotherapy given length from transplant and EBV viremia. Trough goal 5-8  Recent level 5.4. good 12 hour.   Continue tacrolimus 1 mg bid  Will follow-up today's Immuknow and DSA results    Graft function:  Cardiac MRI confirms normal biventricular size and function with no signficant LVH or valvular heart disease.  BP today midly elevated but repeat manual BP by me 122/76.  Continue to monitor and call clinic if BP consistently greater than 135/85.  Encouraged patient to continue regular aerobic exercise aiming for at least 150 minutes of moderate physical activity or 75 minutes of vigorous physical activity - or an equal combination of both - each week. and follow low-salt, heart healthy diet.    CAV prevention  Will change simvastatin to rosuvastatin 10mg daily.  Diet and exercise as above.    Routine screenings/health maintainence:    Derm: due, referral submitted.   Dental: Due 6/2020  Colonoscopy: 2012~dx with medication induced colitis. start at age 40  Breast: Start at age 40  Eye: up to date  Flu/Pneumonia: up to date   Covid: 2/2  Also discussed with patient about pregnancy and risks associated with this.  Pt voiced no plans for pregnancy in the foreseen future.  Discussed that patient should not become pregnant while on statin therapy and should have preconception counseling prior to pursuing pregnancy.  Recommended annual GYN evaluation.  Pt voiced understanding.      # EBV viremia (diagnosed December 2019) without evidence  of PTLD.  Remains asymptomatic.  Will follow-up today's level.  Continue to follow with transplant ID.        Follow-up:  Follow-up outstanding lab from results today to determine timing for repeat labs but at latest in 6 months.  RTC next year for 19th annual with angiogram, RHC, echo, labs and EKG.  Will be happy to see sooner if change in clinical status or new questions/concerns arise.      Simran Rao MD  Section Head - Advanced Heart Failure, Transplantation and Mechanical Circulatory Support  Director - Adult Congenital and Cardiovascular Genetics Center  Associate Professor of Medicine, [facility]    I spent a total of 40 minutes today in chart review as well as personally reviewing recent cardiac testing and/or laboratory results, today's history and examination, and discussion and counseling with the patient

## 2022-08-24 ENCOUNTER — OFFICE VISIT (OUTPATIENT)
Dept: OBGYN | Facility: CLINIC | Age: 22
End: 2022-08-24
Attending: OBSTETRICS & GYNECOLOGY
Payer: COMMERCIAL

## 2022-08-24 VITALS
WEIGHT: 139.1 LBS | HEIGHT: 65 IN | BODY MASS INDEX: 23.17 KG/M2 | HEART RATE: 93 BPM | SYSTOLIC BLOOD PRESSURE: 118 MMHG | DIASTOLIC BLOOD PRESSURE: 80 MMHG

## 2022-08-24 DIAGNOSIS — Z30.09 FAMILY PLANNING COUNSELING: Primary | ICD-10-CM

## 2022-08-24 PROCEDURE — G0463 HOSPITAL OUTPT CLINIC VISIT: HCPCS

## 2022-08-24 PROCEDURE — 99213 OFFICE O/P EST LOW 20 MIN: CPT | Performed by: OBSTETRICS & GYNECOLOGY

## 2022-08-24 NOTE — PROGRESS NOTES
"SUBJECTIVE   Laurel \"Nanette\" Hayde Santoyo is a 22 year old G0, with PMH s/p cardiac transplant in 2004, MDD, anxiety, hypertension, and gender dysphoria, here for med f/u.    Overall, doing \"fine\"  Boyfriend in OR now  LMP July 22 -31: medium flow at start and continued that flow, trailed off at end, 3 pads per day  18-23  Started antidepressant for depressive episodes which has been really helpful, so focused more now on improving bleeding pattern  Feels still much anxiety    Past Medical History  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     Medications  Current Outpatient Medications   Medication     amoxicillin (AMOXIL) 500 MG capsule     aspirin EC 81 MG EC tablet     biotin 1000 MCG TABS tablet     Drospirenone 4 MG TABS     fish oil-omega-3 fatty acids 1000 MG capsule     lisinopril (ZESTRIL) 10 MG tablet     Magnesium 400 MG TABS     rosuvastatin (CRESTOR) 10 MG tablet     sertraline (ZOLOFT) 50 MG tablet     tacrolimus (GENERIC EQUIVALENT) 0.5 MG capsule     vitamin D3 (CHOLECALCIFEROL) 50 mcg (2000 units) tablet     No current facility-administered medications for this visit.     Allergies  No Known Allergies    Review of Systems   ROS: 10 point ROS neg other than the symptoms noted above in the HPI.    OBJECTIVE   /80   Pulse 93   Ht 1.651 m (5' 5\")   Wt 63.1 kg (139 lb 1.6 oz)   BMI 23.15 kg/m    General:  Alert, no distress   Head:  Normocephalic, without obvious abnormality   Extremities:  normal     ASSESSMENT   Laurel Santoyo is a 22 year old G0, contraception follow up.    PLAN     Contraception  Reviewed options (continuing, adding, switching method), patient interested in LNG IUD and prefers " OR placement  Will continue POP for now    15 minutes spent on the date of the encounter doing chart review (previous clinic visits, hospital records, lab results, imaging studies, and procedural documentation) and the patient's history and exam, documentation and further activities as noted above.    Doris Crowder MD MPH

## 2022-08-24 NOTE — LETTER
"8/24/2022       RE: Laurel Santoyo  [address] [id number]     Dear Colleague,    Thank you for referring your patient, Laurel Santoyo, to the [facility] [place] [facility]. Please see a copy of my visit note below.    SUBJECTIVE   Laurel \"Nanette\" Hayde Santoyo is a 22 year old G0, with PMH s/p cardiac transplant in 2004, MDD, anxiety, hypertension, and gender dysphoria, here for med f/u.    Overall, doing \"fine\"  Boyfriend in OR now  LMP July 22 -31: medium flow at start and continued that flow, trailed off at end, 3 pads per day  18-23  Started antidepressant for depressive episodes which has been really helpful, so focused more now on improving bleeding pattern  Feels still much anxiety    Past Medical History  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     Medications  Current Outpatient Medications   Medication     amoxicillin (AMOXIL) 500 MG capsule     aspirin EC 81 MG EC tablet     biotin 1000 MCG TABS tablet     Drospirenone 4 MG TABS     fish oil-omega-3 fatty acids 1000 MG capsule     lisinopril (ZESTRIL) 10 MG tablet     Magnesium 400 MG TABS     rosuvastatin (CRESTOR) 10 MG tablet     sertraline (ZOLOFT) 50 MG tablet     tacrolimus (GENERIC EQUIVALENT) 0.5 MG capsule     vitamin D3 (CHOLECALCIFEROL) 50 mcg (2000 units) tablet     No current facility-administered medications for this visit.     Allergies  No Known Allergies    Review of Systems   ROS: 10 point ROS neg other than the symptoms noted above in the HPI.    OBJECTIVE   /80   Pulse 93   Ht 1.651 m (5' 5\")   Wt 63.1 kg (139 lb 1.6 oz)   BMI 23.15 " kg/m    General:  Alert, no distress   Head:  Normocephalic, without obvious abnormality   Extremities:  normal     ASSESSMENT   Laurel Santoyo is a 22 year old G0, contraception follow up.    PLAN     Contraception  Reviewed options (continuing, adding, switching method), patient interested in LNG IUD and prefers OR placement  Will continue POP for now    15 minutes spent on the date of the encounter doing chart review (previous clinic visits, hospital records, lab results, imaging studies, and procedural documentation) and the patient's history and exam, documentation and further activities as noted above.    Doris Crowder MD MPH

## 2022-09-04 ENCOUNTER — PREP FOR PROCEDURE (OUTPATIENT)
Dept: OBGYN | Facility: CLINIC | Age: 22
End: 2022-09-04

## 2022-09-04 DIAGNOSIS — Z30.430 ENCOUNTER FOR IUD INSERTION: Primary | ICD-10-CM

## 2022-09-04 NOTE — PROGRESS NOTES
Diagnosis: desires IUD with sedation  Case: IUD placement    Surgery planning:  -- Surgeon(s): Lakesha  -- OR time: 30 minutes  -- Preop Exam: day of surgery  -- PAC appt: no  -- Ok for CSC: yes  -- Follow up appt: n/a    Pre-op orders placed, case request today.  MD Dipesh

## 2022-09-06 ENCOUNTER — MYC MEDICAL ADVICE (OUTPATIENT)
Dept: FAMILY MEDICINE | Facility: CLINIC | Age: 22
End: 2022-09-06

## 2022-09-09 ENCOUNTER — TELEPHONE (OUTPATIENT)
Dept: OBGYN | Facility: CLINIC | Age: 22
End: 2022-09-09

## 2022-09-09 NOTE — TELEPHONE ENCOUNTER
Left message for patient to call back and schedule procedure.    Nicole Rosales  Clinical Services Assistant

## 2022-09-12 NOTE — TELEPHONE ENCOUNTER
RN replied to patient via [employer]. See message for details.     Alex Guerin RN, BSN, PHN  [facility]: [place]

## 2022-09-22 NOTE — Clinical Note
Follow up next winter for annual visit. She is usually a Feb/March visit but she will come over college winter break from mid-Dec to mid-Jan. Thanks! [Mucoid Discharge] : mucoid discharge [Inflamed Nasal Mucosa] : inflamed nasal mucosa [NL] : normotonic [de-identified] : dry patches

## 2022-10-03 ENCOUNTER — VIRTUAL VISIT (OUTPATIENT)
Dept: FAMILY MEDICINE | Facility: CLINIC | Age: 22
End: 2022-10-03
Payer: COMMERCIAL

## 2022-10-03 DIAGNOSIS — F33.2 SEVERE EPISODE OF RECURRENT MAJOR DEPRESSIVE DISORDER, WITHOUT PSYCHOTIC FEATURES (H): Primary | ICD-10-CM

## 2022-10-03 DIAGNOSIS — F43.10 PTSD (POST-TRAUMATIC STRESS DISORDER): ICD-10-CM

## 2022-10-03 DIAGNOSIS — F41.1 GAD (GENERALIZED ANXIETY DISORDER): ICD-10-CM

## 2022-10-03 PROCEDURE — 96127 BRIEF EMOTIONAL/BEHAV ASSMT: CPT | Mod: 95 | Performed by: STUDENT IN AN ORGANIZED HEALTH CARE EDUCATION/TRAINING PROGRAM

## 2022-10-03 PROCEDURE — 99214 OFFICE O/P EST MOD 30 MIN: CPT | Mod: 95 | Performed by: STUDENT IN AN ORGANIZED HEALTH CARE EDUCATION/TRAINING PROGRAM

## 2022-10-03 RX ORDER — SERTRALINE HYDROCHLORIDE 100 MG/1
100 TABLET, FILM COATED ORAL DAILY
Qty: 30 TABLET | Refills: 1 | Status: SHIPPED | OUTPATIENT
Start: 2022-10-03 | End: 2022-12-02

## 2022-10-03 ASSESSMENT — ANXIETY QUESTIONNAIRES
2. NOT BEING ABLE TO STOP OR CONTROL WORRYING: NEARLY EVERY DAY
3. WORRYING TOO MUCH ABOUT DIFFERENT THINGS: NEARLY EVERY DAY
IF YOU CHECKED OFF ANY PROBLEMS ON THIS QUESTIONNAIRE, HOW DIFFICULT HAVE THESE PROBLEMS MADE IT FOR YOU TO DO YOUR WORK, TAKE CARE OF THINGS AT HOME, OR GET ALONG WITH OTHER PEOPLE: EXTREMELY DIFFICULT
1. FEELING NERVOUS, ANXIOUS, OR ON EDGE: NEARLY EVERY DAY
GAD7 TOTAL SCORE: 21
GAD7 TOTAL SCORE: 21
4. TROUBLE RELAXING: NEARLY EVERY DAY
7. FEELING AFRAID AS IF SOMETHING AWFUL MIGHT HAPPEN: NEARLY EVERY DAY
5. BEING SO RESTLESS THAT IT IS HARD TO SIT STILL: NEARLY EVERY DAY
7. FEELING AFRAID AS IF SOMETHING AWFUL MIGHT HAPPEN: NEARLY EVERY DAY
8. IF YOU CHECKED OFF ANY PROBLEMS, HOW DIFFICULT HAVE THESE MADE IT FOR YOU TO DO YOUR WORK, TAKE CARE OF THINGS AT HOME, OR GET ALONG WITH OTHER PEOPLE?: EXTREMELY DIFFICULT
6. BECOMING EASILY ANNOYED OR IRRITABLE: NEARLY EVERY DAY
GAD7 TOTAL SCORE: 21

## 2022-10-03 ASSESSMENT — PATIENT HEALTH QUESTIONNAIRE - PHQ9
SUM OF ALL RESPONSES TO PHQ QUESTIONS 1-9: 21
10. IF YOU CHECKED OFF ANY PROBLEMS, HOW DIFFICULT HAVE THESE PROBLEMS MADE IT FOR YOU TO DO YOUR WORK, TAKE CARE OF THINGS AT HOME, OR GET ALONG WITH OTHER PEOPLE: EXTREMELY DIFFICULT
SUM OF ALL RESPONSES TO PHQ QUESTIONS 1-9: 21
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
## 2022-11-15 ENCOUNTER — TELEPHONE (OUTPATIENT)
Dept: TRANSPLANT | Facility: CLINIC | Age: 22
End: 2022-11-15

## 2022-11-15 ENCOUNTER — APPOINTMENT (OUTPATIENT)
Dept: GENERAL RADIOLOGY | Facility: CLINIC | Age: 22
End: 2022-11-15
Attending: EMERGENCY MEDICINE
Payer: COMMERCIAL

## 2022-11-15 ENCOUNTER — NURSE TRIAGE (OUTPATIENT)
Dept: FAMILY MEDICINE | Facility: CLINIC | Age: 22
End: 2022-11-15

## 2022-11-15 ENCOUNTER — E-VISIT (OUTPATIENT)
Dept: URGENT CARE | Facility: CLINIC | Age: 22
End: 2022-11-15
Payer: COMMERCIAL

## 2022-11-15 ENCOUNTER — HOSPITAL ENCOUNTER (EMERGENCY)
Facility: CLINIC | Age: 22
Discharge: HOME | End: 2022-11-15
Attending: EMERGENCY MEDICINE | Admitting: EMERGENCY MEDICINE
Payer: COMMERCIAL

## 2022-11-15 VITALS
DIASTOLIC BLOOD PRESSURE: 75 MMHG | OXYGEN SATURATION: 93 % | TEMPERATURE: 98.6 F | HEART RATE: 110 BPM | SYSTOLIC BLOOD PRESSURE: 104 MMHG | RESPIRATION RATE: 20 BRPM

## 2022-11-15 DIAGNOSIS — B33.8 RSV (RESPIRATORY SYNCYTIAL VIRUS INFECTION): ICD-10-CM

## 2022-11-15 DIAGNOSIS — R06.02 SHORTNESS OF BREATH: ICD-10-CM

## 2022-11-15 DIAGNOSIS — R05.1 ACUTE COUGH: Primary | ICD-10-CM

## 2022-11-15 LAB
ALBUMIN SERPL BCG-MCNC: 4.5 G/DL (ref 3.5–5.2)
ALP SERPL-CCNC: 65 U/L (ref 35–129)
ALT SERPL W P-5'-P-CCNC: 7 U/L (ref 10–50)
ANION GAP SERPL CALCULATED.3IONS-SCNC: 15 MMOL/L (ref 7–15)
AST SERPL W P-5'-P-CCNC: 17 U/L (ref 10–50)
ATRIAL RATE - MUSE: 101 BPM
BASOPHILS # BLD AUTO: 0 10E3/UL (ref 0–0.2)
BASOPHILS NFR BLD AUTO: 0 %
BILIRUB SERPL-MCNC: 0.3 MG/DL
BUN SERPL-MCNC: 13.8 MG/DL (ref 6–20)
CALCIUM SERPL-MCNC: 10 MG/DL (ref 8.6–10)
CHLORIDE SERPL-SCNC: 100 MMOL/L (ref 98–107)
CREAT SERPL-MCNC: 0.86 MG/DL (ref 0.51–1.17)
DEPRECATED HCO3 PLAS-SCNC: 21 MMOL/L (ref 22–29)
DIASTOLIC BLOOD PRESSURE - MUSE: NORMAL MMHG
EOSINOPHIL # BLD AUTO: 0.6 10E3/UL (ref 0–0.7)
EOSINOPHIL NFR BLD AUTO: 7 %
ERYTHROCYTE [DISTWIDTH] IN BLOOD BY AUTOMATED COUNT: 14.9 % (ref 10–15)
FLUAV RNA SPEC QL NAA+PROBE: NEGATIVE
FLUBV RNA RESP QL NAA+PROBE: NEGATIVE
GFR SERPL CREATININE-BSD FRML MDRD: >90 ML/MIN/1.73M2
GLUCOSE SERPL-MCNC: 115 MG/DL (ref 70–99)
HCG SERPL QL: NEGATIVE
HCT VFR BLD AUTO: 36.9 % (ref 35–53)
HGB BLD-MCNC: 11.5 G/DL (ref 11.7–17.7)
HOLD SPECIMEN: NORMAL
IMM GRANULOCYTES # BLD: 0 10E3/UL
IMM GRANULOCYTES NFR BLD: 0 %
INTERPRETATION ECG - MUSE: NORMAL
LYMPHOCYTES # BLD AUTO: 2.2 10E3/UL (ref 0.8–5.3)
LYMPHOCYTES NFR BLD AUTO: 23 %
MCH RBC QN AUTO: 23.5 PG (ref 26.5–33)
MCHC RBC AUTO-ENTMCNC: 31.2 G/DL (ref 31.5–36.5)
MCV RBC AUTO: 76 FL (ref 78–100)
MONOCYTES # BLD AUTO: 1.1 10E3/UL (ref 0–1.3)
MONOCYTES NFR BLD AUTO: 12 %
NEUTROPHILS # BLD AUTO: 5.4 10E3/UL (ref 1.6–8.3)
NEUTROPHILS NFR BLD AUTO: 58 %
NRBC # BLD AUTO: 0 10E3/UL
NRBC BLD AUTO-RTO: 0 /100
NT-PROBNP SERPL-MCNC: 453 PG/ML (ref 0–450)
P AXIS - MUSE: 49 DEGREES
PLATELET # BLD AUTO: 265 10E3/UL (ref 150–450)
POTASSIUM SERPL-SCNC: 4.3 MMOL/L (ref 3.4–5.3)
PR INTERVAL - MUSE: 126 MS
PROT SERPL-MCNC: 8.7 G/DL (ref 6.4–8.3)
QRS DURATION - MUSE: 138 MS
QT - MUSE: 366 MS
QTC - MUSE: 474 MS
R AXIS - MUSE: 94 DEGREES
RBC # BLD AUTO: 4.89 10E6/UL (ref 3.8–5.9)
RSV RNA SPEC NAA+PROBE: POSITIVE
SARS-COV-2 RNA RESP QL NAA+PROBE: NEGATIVE
SODIUM SERPL-SCNC: 136 MMOL/L (ref 136–145)
SYSTOLIC BLOOD PRESSURE - MUSE: NORMAL MMHG
T AXIS - MUSE: -79 DEGREES
TROPONIN T SERPL HS-MCNC: <6 NG/L
VENTRICULAR RATE- MUSE: 101 BPM
WBC # BLD AUTO: 9.3 10E3/UL (ref 4–11)

## 2022-11-15 PROCEDURE — 99285 EMERGENCY DEPT VISIT HI MDM: CPT | Mod: 25 | Performed by: EMERGENCY MEDICINE

## 2022-11-15 PROCEDURE — C9803 HOPD COVID-19 SPEC COLLECT: HCPCS | Performed by: EMERGENCY MEDICINE

## 2022-11-15 PROCEDURE — 87637 SARSCOV2&INF A&B&RSV AMP PRB: CPT | Performed by: EMERGENCY MEDICINE

## 2022-11-15 PROCEDURE — 80053 COMPREHEN METABOLIC PANEL: CPT | Performed by: EMERGENCY MEDICINE

## 2022-11-15 PROCEDURE — 99207 PR NO CHARGE LOS: CPT | Performed by: EMERGENCY MEDICINE

## 2022-11-15 PROCEDURE — 85025 COMPLETE CBC W/AUTO DIFF WBC: CPT | Performed by: EMERGENCY MEDICINE

## 2022-11-15 PROCEDURE — 93010 ELECTROCARDIOGRAM REPORT: CPT | Performed by: EMERGENCY MEDICINE

## 2022-11-15 PROCEDURE — 93005 ELECTROCARDIOGRAM TRACING: CPT | Performed by: EMERGENCY MEDICINE

## 2022-11-15 PROCEDURE — 71046 X-RAY EXAM CHEST 2 VIEWS: CPT | Mod: 26 | Performed by: RADIOLOGY

## 2022-11-15 PROCEDURE — 36415 COLL VENOUS BLD VENIPUNCTURE: CPT | Performed by: EMERGENCY MEDICINE

## 2022-11-15 PROCEDURE — 84484 ASSAY OF TROPONIN QUANT: CPT | Performed by: EMERGENCY MEDICINE

## 2022-11-15 PROCEDURE — 83880 ASSAY OF NATRIURETIC PEPTIDE: CPT | Performed by: EMERGENCY MEDICINE

## 2022-11-15 PROCEDURE — 84703 CHORIONIC GONADOTROPIN ASSAY: CPT | Performed by: EMERGENCY MEDICINE

## 2022-11-15 PROCEDURE — 71046 X-RAY EXAM CHEST 2 VIEWS: CPT

## 2022-11-15 RX ORDER — ALBUTEROL SULFATE 90 UG/1
2 AEROSOL, METERED RESPIRATORY (INHALATION) EVERY 4 HOURS PRN
Qty: 8 G | Refills: 0 | Status: SHIPPED | OUTPATIENT
Start: 2022-11-15 | End: 2022-11-15

## 2022-11-15 RX ORDER — ALBUTEROL SULFATE 90 UG/1
2 AEROSOL, METERED RESPIRATORY (INHALATION) EVERY 4 HOURS PRN
Qty: 8 G | Refills: 0 | Status: SHIPPED | OUTPATIENT
Start: 2022-11-15

## 2022-11-15 ASSESSMENT — ACTIVITIES OF DAILY LIVING (ADL)
ADLS_ACUITY_SCORE: 37

## 2022-11-15 NOTE — ED PROVIDER NOTES
History     Chief Complaint   Patient presents with     Shortness of Breath     HPI  Laurel Santoyo is a 22 year old adult with a past medical history of cardiac transplant (2004), aortic stenosis, depression/anxiety who presents to the emergency department with a chief complaint of shortness of breath and chest tightness.  Symptoms are present both at rest and with activity.  The patient states they started having upper respiratory infection symptoms on Saturday, including cough and nasal congestion.  They note that the symptoms progressed and the shortness of breath started today.  Associated with dizziness and weakness as well as body aches, headache.  The patient denies any fevers.  No leg swelling or pain.  The patient does feel anxious due to these symptoms.  The patient has been able to tolerate food and fluids at home.  An at home COVID test was negative.  The patient states they have been around multiple sick children while volunteering at [employer] and [employer] recently.    The patient is vaccinated against COVID-19, having received the second COVID booster this summer in July.  Patient did not get a flu vaccine yet this year.    I have reviewed the Medications, Allergies, Past Medical and Surgical History, and Social History in the [employer] system.    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     Past Surgical History:   Procedure Laterality Date     ANGIOGRAPHY  8/14/2012    Procedure: ANGIOGRAPHY;;  Surgeon: Melanie Arias MD;  Location: [facility] OR     HEART CATH CHILD  7/11/2011    Procedure:HEART CATH CHILD; Right Left, Biopsy and Possible Coarct Stent; Surgeon:MELANIE ARIAS;  Location:[facility] OR     HEART CATH CHILD  2012    Procedure: HEART CATH CHILD;  Left Heart Cath, Coronary Angiogram with Possible Balloon Dilatation of Aorta;  Surgeon: Nick Arias MD;  Location: [facility] OR     INSERT PICC LINE  2012    Procedure: INSERT PICC LINE;  Picc Line Placement;  Surgeon: ANNA Bailey MD;  Location: [facility] OR     ZC TRANSPLANTATION OF HEART  2004     No current facility-administered medications for this encounter.     Current Outpatient Medications   Medication     amoxicillin (AMOXIL) 500 MG capsule     aspirin EC 81 MG EC tablet     biotin 1000 MCG TABS tablet     Drospirenone 4 MG TABS     lisinopril (ZESTRIL) 10 MG tablet     Magnesium 400 MG TABS     rosuvastatin (CRESTOR) 10 MG tablet     sertraline (ZOLOFT) 100 MG tablet     tacrolimus (GENERIC EQUIVALENT) 0.5 MG capsule     vitamin D3 (CHOLECALCIFEROL) 50 mcg (2000 units) tablet     No Known Allergies  Past medical history, past surgical history, medications, and allergies were reviewed with the patient. Additional pertinent items: None    Social History     Socioeconomic History     Marital status: Single     Spouse name: Not on file     Number of children: Not on file     Years of education: Not on file     Highest education level: Not on file   Occupational History     Not on file   Tobacco Use     Smoking status: Never     Smokeless tobacco: Never   Vaping Use     Vaping Use: Never used   Substance and Sexual Activity     Alcohol use: Yes     Comment: Seldom/Occ     Drug use: Never     Sexual activity: Not Currently     Partners: Female, Male     Birth control/protection: None   Other Topics Concern     Not on file   Social History Narrative    FAMILY INFORMATION     Date: 2007    Parent #1      Name: Shaneka Santoyo   Gender: Female   : 1965      Education: M.A.   Occupation:         Parent #2      Name: Mina Santoyo   Gender: Male   : 1969     Education: M.A.    Occupation:         Siblings:      Vince Santoyo                Gender: Male           : 1998    Denice Santoyo           Gender: Female      : 2002        Relationship Status of Parent(s):     Who does the child live with? Parents, brother, and sister    What language(s) is/are spoken at home? English             Social Determinants of Health     Financial Resource Strain: Not on file   Food Insecurity: Not on file   Transportation Needs: Not on file   Physical Activity: Not on file   Stress: Not on file   Social Connections: Not on file   Intimate Partner Violence: Not on file   Housing Stability: Not on file     Social history was reviewed with the patient. Additional pertinent items: None    Review of Systems  General: No fevers or chills  Skin: No rash or diaphoresis  Eyes: No eye redness or discharge  Ears/Nose/Throat: No rhinorrhea or nasal congestion  Respiratory: No cough or SOB  Cardiovascular: No chest pain or palpitations  Gastrointestinal: No nausea, vomiting, or diarrhea  Genitourinary: No urinary frequency, hematuria, or dysuria  Musculoskeletal: No arthralgias or myalgias  Neurologic: No numbness or weakness  Psychiatric: No depression or SI  Hematologic/Lymphatic/Immunologic: No leg swelling, no easy bruising/bleeding  Endocrine: No polyuria/polydypsia    A complete review of systems was performed with pertinent positives and negatives noted in the HPI, and all other systems negative.    Physical Exam   BP: 104/75  Pulse: 110  Temp: 98.6  F (37  C)  Resp: 20  SpO2: 93 %      General: Well nourished, well developed, NAD  HEENT: EOMI, anicteric. NCAT, MMM  Neck: no jugular venous distension, supple, nl ROM  Cardiac: Tachycardic rate, regular rhythm, intact peripheral pulses   Pulm: CTAB, no stridor, wheezes, rales, rhonchi, occasional cough present  Skin: Warm and dry to the touch.  No rash  Extremities: No LE edema, no cyanosis, w/w/p, no posterior calf  tenderness  Neuro: A&Ox3, no gross focal deficits    ED Course        Procedures            EKG Interpretation:      Interpreted by Gisella Sanchez MD  Time reviewed: 1423  Symptoms at time of EKG: SOB   Rhythm: Sinus tachycardia  Rate: Tachycardic, 101 bpm  Axis: Right  Ectopy: none  Conduction: IVCD  ST Segments/ T Waves: Lateral T wave inversion, no ST-T wave changes  Q Waves: none  Comparison to prior: Compared to previous EKGs dated April 13, 2022, there are no acute changes other than interval development of mild sinus tachycardia (previous heart rate in the 90s).    Clinical Impression: abnormal EKG                        Labs Ordered and Resulted from Time of ED Arrival to Time of ED Departure   COMPREHENSIVE METABOLIC PANEL - Abnormal       Result Value    Sodium 136      Potassium 4.3      Chloride 100      Carbon Dioxide (CO2) 21 (*)     Anion Gap 15      Urea Nitrogen 13.8      Creatinine 0.86      Calcium 10.0      Glucose 115 (*)     Alkaline Phosphatase 65      AST 17      ALT 7 (*)     Protein Total 8.7 (*)     Albumin 4.5      Bilirubin Total 0.3      GFR Estimate >90     NT PROBNP INPATIENT - Abnormal    N terminal Pro BNP Inpatient 453 (*)    INFLUENZA A/B & SARS-COV2 PCR MULTIPLEX - Abnormal    Influenza A PCR Negative      Influenza B PCR Negative      RSV PCR Positive (*)     SARS CoV2 PCR Negative     CBC WITH PLATELETS AND DIFFERENTIAL - Abnormal    WBC Count 9.3      RBC Count 4.89      Hemoglobin 11.5 (*)     Hematocrit 36.9      MCV 76 (*)     MCH 23.5 (*)     MCHC 31.2 (*)     RDW 14.9      Platelet Count 265      % Neutrophils 58      % Lymphocytes 23      % Monocytes 12      % Eosinophils 7      % Basophils 0      % Immature Granulocytes 0      NRBCs per 100 WBC 0      Absolute Neutrophils 5.4      Absolute Lymphocytes 2.2      Absolute Monocytes 1.1      Absolute Eosinophils 0.6      Absolute Basophils 0.0      Absolute Immature Granulocytes 0.0      Absolute NRBCs 0.0      TROPONIN T, HIGH SENSITIVITY - Normal    Troponin T, High Sensitivity <6     HCG QUALITATIVE PREGNANCY - Normal    hCG Serum Qualitative Negative              Results for orders placed or performed during the hospital encounter of 11/15/22 (from the past 24 hour(s))   XR Chest 2 Views    Narrative    XR CHEST 2 VIEWS  11/15/2022 2:05 PM      HISTORY: SOB, cough    COMPARISON: 4/13/2022    FINDINGS: PA and lateral views. Postsurgical changes of heart  transplant. No acute airspace opacities, pleural effusion, or  pneumothorax.      Impression    IMPRESSION: No acute airspace opacities.    I have personally reviewed the examination and initial interpretation  and I agree with the findings.    MALLORY LOPEZ MD         SYSTEM ID:  [id number]   CBC with platelets differential    Narrative    The following orders were created for panel order CBC with platelets differential.  Procedure                               Abnormality         Status                     ---------                               -----------         ------                     CBC with platelets and d...[[id number]]  Abnormal            Final result                 Please view results for these tests on the individual orders.   Comprehensive metabolic panel   Result Value Ref Range    Sodium 136 136 - 145 mmol/L    Potassium 4.3 3.4 - 5.3 mmol/L    Chloride 100 98 - 107 mmol/L    Carbon Dioxide (CO2) 21 (L) 22 - 29 mmol/L    Anion Gap 15 7 - 15 mmol/L    Urea Nitrogen 13.8 6.0 - 20.0 mg/dL    Creatinine 0.86 0.51 - 1.17 mg/dL    Calcium 10.0 8.6 - 10.0 mg/dL    Glucose 115 (H) 70 - 99 mg/dL    Alkaline Phosphatase 65 35 - 129 U/L    AST 17 10 - 50 U/L    ALT 7 (L) 10 - 50 U/L    Protein Total 8.7 (H) 6.4 - 8.3 g/dL    Albumin 4.5 3.5 - 5.2 g/dL    Bilirubin Total 0.3 <=1.2 mg/dL    GFR Estimate >90 >60 mL/min/1.73m2    Narrative    The sex of this patient cannot be reliably determined based on discrepancies in demographics (legal sex, sex assigned  at birth, gender identity).  Both male and female reference ranges are provided where applicable.  Careful evaluation of the patient s results as compared to the gender specific reference intervals is required in this setting.    Troponin T, High Sensitivity   Result Value Ref Range    Troponin T, High Sensitivity <6 <=22 ng/L   Nt probnp inpatient (BNP)   Result Value Ref Range    N terminal Pro BNP Inpatient 453 (H) 0 - 450 pg/mL   Symptomatic; Unknown Influenza A/B & SARS-CoV2 (COVID-19) Virus PCR Multiplex Nasopharyngeal    Specimen: Nasopharyngeal; Swab   Result Value Ref Range    Influenza A PCR Negative Negative    Influenza B PCR Negative Negative    RSV PCR Positive (A) Negative    SARS CoV2 PCR Negative Negative    Narrative    Testing was performed using the Xpert Xpress CoV2/Flu/RSV Assay on the [employer]pert Instrument. This test should be ordered for the detection of SARS-CoV-2 and influenza viruses in individuals who meet clinical and/or epidemiological criteria. Test performance is unknown in asymptomatic patients. This test is for in vitro diagnostic use under the FDA EUA for laboratories certified under CLIA to perform high or moderate complexity testing. This test has not been FDA cleared or approved. A negative result does not rule out the presence of PCR inhibitors in the specimen or target RNA in concentration below the limit of detection for the assay. If only one viral target is positive but coinfection with multiple targets is suspected, the sample should be re-tested with another FDA cleared, approved, or authorized test, if coinfection would change clinical management. This test was validated by the [facility]. These laboratories are certified under the Clinical Laboratory Improvement Amendments of 1988 (CLIA-88) as qualified to perform high complexity laboratory testing.   HCG qualitative Blood   Result Value Ref Range    hCG Serum Qualitative Negative Negative    CBC with platelets and differential   Result Value Ref Range    WBC Count 9.3 4.0 - 11.0 10e3/uL    RBC Count 4.89 3.80 - 5.90 10e6/uL    Hemoglobin 11.5 (L) 11.7 - 17.7 g/dL    Hematocrit 36.9 35.0 - 53.0 %    MCV 76 (L) 78 - 100 fL    MCH 23.5 (L) 26.5 - 33.0 pg    MCHC 31.2 (L) 31.5 - 36.5 g/dL    RDW 14.9 10.0 - 15.0 %    Platelet Count 265 150 - 450 10e3/uL    % Neutrophils 58 %    % Lymphocytes 23 %    % Monocytes 12 %    % Eosinophils 7 %    % Basophils 0 %    % Immature Granulocytes 0 %    NRBCs per 100 WBC 0 <1 /100    Absolute Neutrophils 5.4 1.6 - 8.3 10e3/uL    Absolute Lymphocytes 2.2 0.8 - 5.3 10e3/uL    Absolute Monocytes 1.1 0.0 - 1.3 10e3/uL    Absolute Eosinophils 0.6 0.0 - 0.7 10e3/uL    Absolute Basophils 0.0 0.0 - 0.2 10e3/uL    Absolute Immature Granulocytes 0.0 <=0.4 10e3/uL    Absolute NRBCs 0.0 10e3/uL    Narrative    The sex of this patient cannot be reliably determined based on discrepancies in demographics (legal sex, sex assigned at birth, gender identity).  Both male and female reference ranges are provided where applicable.  Careful evaluation of the patient s results as compared to the gender specific reference intervals is required in this setting.    Extra Tube    Narrative    The following orders were created for panel order Extra Tube.  Procedure                               Abnormality         Status                     ---------                               -----------         ------                     Extra Blue Top Tube[[id number]]                              Final result                 Please view results for these tests on the individual orders.   Extra Blue Top Tube   Result Value Ref Range    Hold Specimen JIC        Labs, vital signs, and imaging studies were reviewed by me.    Medications - No data to display    Assessments & Plan (with Medical Decision Making)   Laurel Santoyo is a 22 year old adult who presents to the emergency department with shortness of  breath.  Differential diagnosis includes pneumonia, bronchitis, RSV, influenza, covid-19 infection, other viral syndrome, anxiety, CHF, cardiac arrhythmia.  Labs, EKG, chest x-ray ordered to further evaluate the patient.    EKG shows sinus tachycardia, no other acute changes compared to prior EKGs in our system.    Laboratory work-up is remarkable for normal troponin, RSV testing positive    Chest x-ray shows no acute airspace opacities    I have reviewed the nursing notes.    I have reviewed the findings, diagnosis, plan and need for follow up with the patient.    Patient to be discharged home. Advised to follow up with PCP within 1 week. To return to ER immediately with any new/worsening symptoms. Plan of care discussed with patient who expresses understanding and agrees with plan of care.    New Prescriptions    No medications on file       Final diagnoses:   Shortness of breath   RSV (respiratory syncytial virus infection)     Gisella Sanchez MD  11/15/2022   [facility] EMERGENCY DEPARTMENT     Gisella Sanchez MD  11/15/22 [id number]

## 2022-11-15 NOTE — ED TRIAGE NOTES
Pt reports shortness of breath today, dry cough. Cold since this weekend. Hx heart transplant, aortic stenosis.     Triage Assessment     Row Name 11/15/22 1411       Triage Assessment (Adult)    Airway WDL WDL       Respiratory WDL    Respiratory WDL X;cough;rhythm/pattern  congestion    Rhythm/Pattern, Respiratory shortness of breath    Cough Frequency frequent    Cough Type dry       Skin Circulation/Temperature WDL    Skin Circulation/Temperature WDL WDL       Cardiac WDL    Cardiac WDL WDL       Peripheral/Neurovascular WDL    Peripheral Neurovascular WDL WDL       Cognitive/Neuro/Behavioral WDL    Cognitive/Neuro/Behavioral WDL WDL

## 2022-11-15 NOTE — PATIENT INSTRUCTIONS
Dear Laurel Santoyo,    We are sorry you are not feeling well. Based on the responses you provided, it is recommended that you be seen in-person in urgent care BECAUSE OF YOUR SHORTNESS OF BREATH so we can better evaluate your symptoms. Please click here to find the nearest urgent care location to you.   You will not be charged for this Visit. Thank you for trusting us with your care.    Dieudonne Neely MD

## 2022-11-15 NOTE — LETTER
November 15, 2022      To Whom It May Concern:      Laurel Santoyo was seen in our Emergency Department today, 11/15/22.  Please excuse her from class until her symptoms improve.     Sincerely,        Gisella Sanchez MD

## 2022-11-15 NOTE — TELEPHONE ENCOUNTER
General  Route to LPN    Reason for call: Laurel met with their primary care provider due to symptoms. They report that their primary doctor is recommending testing for strep/pneumonia or flu    Had cold symptoms on Saturday, whole body aches, runny nose, and cough. Cough has gotten worse, barely able to sleep as of today, and now having issues breathing. They report they are only able to make shallow breaths.      They don't want to come to the ER/ED but wants to see possible options.     Call back needed? Yes  Return Call Needed  Same as documented in contacts section  When to return call?: Same day: Route High Priority

## 2022-11-15 NOTE — DISCHARGE INSTRUCTIONS
TODAY'S VISIT:  You were seen today for shortness of breath   -   - If you had any labs or imaging/radiology tests performed today, you should also discuss these tests with your usual provider.     FOLLOW-UP:  Please make an appointment to follow up with:  - Your Primary Care Provider. If you do not have a PCP, please call the Primary Care Center (phone: [phone] for an appointmen    - Have your provider review the results from today's visit with you again to make sure no further follow-up or additional testing is needed based on those results.     RETURN TO THE EMERGENCY DEPARTMENT  Return to the Emergency Department at any time for any new or worsening symptoms or any concerns.

## 2022-11-16 ENCOUNTER — TELEPHONE (OUTPATIENT)
Dept: FAMILY MEDICINE | Facility: CLINIC | Age: 22
End: 2022-11-16

## 2022-11-16 NOTE — TELEPHONE ENCOUNTER
Called patient and scheduled ED follow up with Dr Ray's 1st available.    KULDIP Cordova  [facility]

## 2022-11-16 NOTE — TELEPHONE ENCOUNTER
Attempt #1 to call patient and schedule.    RN noticed there is an appointment for follow-up 11/25.    RN left voicemail and requested return call to [facility] at [phone] with any questions otherwise keep 11/25 appointment with Doctor Ray.     Mony Marion RN  [facility] [place]

## 2022-11-16 NOTE — TELEPHONE ENCOUNTER
Reason for Call:  Other appointment    Detailed comments: Patient called looking to schedule a hospital follow up next week. Patient was seen on 11/15/22 for RSV.    This message is being sent due to unavailability at several locations.    Phone Number Patient can be reached at: Cell number on file:    Telephone Information:   Mobile [phone]           Best Time: Any time.    Can we leave a detailed message on this number? YES    Call taken on 11/16/2022 at 2:25 PM by Eduin Licona

## 2022-11-19 ENCOUNTER — HEALTH MAINTENANCE LETTER (OUTPATIENT)
Age: 22
End: 2022-11-19

## 2022-11-25 ENCOUNTER — OFFICE VISIT (OUTPATIENT)
Dept: FAMILY MEDICINE | Facility: CLINIC | Age: 22
End: 2022-11-25
Payer: COMMERCIAL

## 2022-11-25 VITALS
HEART RATE: 89 BPM | HEIGHT: 65 IN | BODY MASS INDEX: 22.16 KG/M2 | SYSTOLIC BLOOD PRESSURE: 125 MMHG | WEIGHT: 133 LBS | TEMPERATURE: 98.1 F | OXYGEN SATURATION: 100 % | DIASTOLIC BLOOD PRESSURE: 84 MMHG

## 2022-11-25 DIAGNOSIS — F43.10 PTSD (POST-TRAUMATIC STRESS DISORDER): ICD-10-CM

## 2022-11-25 DIAGNOSIS — H69.91 ACUTE DYSFUNCTION OF RIGHT EUSTACHIAN TUBE: ICD-10-CM

## 2022-11-25 DIAGNOSIS — J21.0 RSV BRONCHIOLITIS: Primary | ICD-10-CM

## 2022-11-25 DIAGNOSIS — F33.2 SEVERE EPISODE OF RECURRENT MAJOR DEPRESSIVE DISORDER, WITHOUT PSYCHOTIC FEATURES (H): ICD-10-CM

## 2022-11-25 DIAGNOSIS — F41.1 GAD (GENERALIZED ANXIETY DISORDER): ICD-10-CM

## 2022-11-25 PROCEDURE — 96127 BRIEF EMOTIONAL/BEHAV ASSMT: CPT | Performed by: STUDENT IN AN ORGANIZED HEALTH CARE EDUCATION/TRAINING PROGRAM

## 2022-11-25 PROCEDURE — 99213 OFFICE O/P EST LOW 20 MIN: CPT | Performed by: STUDENT IN AN ORGANIZED HEALTH CARE EDUCATION/TRAINING PROGRAM

## 2022-11-25 RX ORDER — FLUTICASONE PROPIONATE 50 MCG
2 SPRAY, SUSPENSION (ML) NASAL DAILY
Qty: 16 G | Refills: 0 | Status: ON HOLD | OUTPATIENT
Start: 2022-11-25 | End: 2023-01-01

## 2022-11-25 ASSESSMENT — PATIENT HEALTH QUESTIONNAIRE - PHQ9
SUM OF ALL RESPONSES TO PHQ QUESTIONS 1-9: 13
SUM OF ALL RESPONSES TO PHQ QUESTIONS 1-9: 13
10. IF YOU CHECKED OFF ANY PROBLEMS, HOW DIFFICULT HAVE THESE PROBLEMS MADE IT FOR YOU TO DO YOUR WORK, TAKE CARE OF THINGS AT HOME, OR GET ALONG WITH OTHER PEOPLE: EXTREMELY DIFFICULT

## 2022-11-25 NOTE — PROGRESS NOTES
Assessment & Plan     RSV bronchiolitis  Improving but slowly due to immunosuppressed state. Vitals good. On day 10 of illness. Continue supportive care.     Acute dysfunction of right eustachian tube  Recommend flonase for 7 days. She does have some mild erythema, not consistent with AOM at this time, but if she develops ear pain instructed to let me know and will prescribe antibiotic   - fluticasone (FLONASE) 50 MCG/ACT nasal spray; Spray 2 sprays into both nostrils daily    Severe episode of recurrent major depressive disorder, without psychotic features (H)  PTSD (post-traumatic stress disorder)  MORGAN (generalized anxiety disorder)  She recently established with psychiatry but would like to be seen in [place]. Will refer.   - Adult Mental Health  Referral; Future    Return if symptoms worsen or fail to improve.    Mariia Ray DO  [facility]    Bi Barragan is a 22 year old, presenting for the following health issues:  ER F/U      HPI     ED/UC Followup:    Facility:  [facility]  Date of visit: 11/15/2022  Reason for visit: SOB  Current Status: improving    Positive for RSV on 11/15/22     Symptoms:  Fever: No  Chills/Sweats: Not currently just the first few days after her er visit  Headache (location?): YES  Sinus Pressure: No  Conjunctivitis:  No  Ear Pain: YES- muffled hearing right ear. notice this a few weeks ago.   Rhinorrhea: No  Congestion: YES Nasal  Sore Throat: No  Cough: YES dry   Wheeze: YES  Decreased Appetite: No  Nausea: No  Vomiting: No  Diarrhea: YES chronic   Dysuria/Freq.: No  Dysuria or Hematuria: No  Fatigue/Achiness: YES  Sick/Strep Exposure: Wears mask. a week before ER visit they had babysat two kids. Parents work at a school. She goes to school.  Both kids had a cold. Age 5 and 3  Therapies tried and outcome: cough drops, tylenol, humidifier        Breathing is better. Still has a cough. Dyspneic with  "exertion. Overall better than when went into the ED. Wheezing improved. Has been using      Was neg for covid and influenza. Positive RSV 11/15/22.     Has moved back in with their parents home recently. This has been difficult. Doesn't feel validated when ill or discussing feelings. Came out to parents this summer and also told them they are non-binary, which has been difficult. They have also struggled with eating disorder since younger, has almost relapsed since being home        Review of Systems   Constitutional, HEENT, cardiovascular, pulmonary, gi and gu systems are negative, except as otherwise noted.      Objective    /84 (BP Location: Right arm, Patient Position: Sitting, Cuff Size: Adult Regular)   Pulse 89   Temp 98.1  F (36.7  C) (Tympanic)   Ht 1.651 m (5' 5\")   Wt 60.3 kg (133 lb)   SpO2 100%   BMI 22.13 kg/m    Body mass index is 22.13 kg/m .  Physical Exam   GENERAL: healthy, alert and no distress  EYES: Eyes grossly normal to inspection, PERRL and conjunctivae and sclerae normal  HENT: ear canals and left TM normal, right TM mildly erythematous with effusion, nose and mouth without ulcers or lesions, enlarged tonsils.   NECK: anterior adenopathy, no asymmetry, masses, or scars and thyroid normal to palpation  RESP: few rhonchi b/l, moving air well throughout - no rales, wheezes  CV: regular rate and rhythm, normal S1 S2, no S3 or S4, no murmur, click or rub, no peripheral edema and peripheral pulses strong  MS: no gross musculoskeletal defects noted, no edema  PSYCH: mentation appears normal, affect normal/bright                  Answers for HPI/ROS submitted by the patient on 11/25/2022  If you checked off any problems, how difficult have these problems made it for you to do your work, take care of things at home, or get along with other people?: Extremely difficult  PHQ9 TOTAL SCORE: 13          "

## 2022-12-02 DIAGNOSIS — F43.10 PTSD (POST-TRAUMATIC STRESS DISORDER): ICD-10-CM

## 2022-12-02 DIAGNOSIS — F33.2 SEVERE EPISODE OF RECURRENT MAJOR DEPRESSIVE DISORDER, WITHOUT PSYCHOTIC FEATURES (H): ICD-10-CM

## 2022-12-02 DIAGNOSIS — F41.1 GAD (GENERALIZED ANXIETY DISORDER): ICD-10-CM

## 2022-12-02 RX ORDER — SERTRALINE HYDROCHLORIDE 100 MG/1
TABLET, FILM COATED ORAL
Qty: 30 TABLET | Refills: 1 | Status: SHIPPED | OUTPATIENT
Start: 2022-12-02 | End: 2023-01-01

## 2022-12-06 ENCOUNTER — OFFICE VISIT (OUTPATIENT)
Dept: DERMATOLOGY | Facility: CLINIC | Age: 22
End: 2022-12-06
Payer: COMMERCIAL

## 2022-12-06 DIAGNOSIS — Z94.1 HEART REPLACED BY TRANSPLANT (H): ICD-10-CM

## 2022-12-06 DIAGNOSIS — L20.9 ATOPIC DERMATITIS, UNSPECIFIED TYPE: ICD-10-CM

## 2022-12-06 DIAGNOSIS — L73.9 FOLLICULITIS: ICD-10-CM

## 2022-12-06 DIAGNOSIS — L21.9 DERMATITIS, SEBORRHEIC: Primary | ICD-10-CM

## 2022-12-06 PROCEDURE — 99214 OFFICE O/P EST MOD 30 MIN: CPT | Performed by: DERMATOLOGY

## 2022-12-06 RX ORDER — TRIAMCINOLONE ACETONIDE 1 MG/G
OINTMENT TOPICAL
Qty: 80 G | Refills: 11 | Status: SHIPPED | OUTPATIENT
Start: 2022-12-06 | End: 2023-01-01

## 2022-12-06 RX ORDER — KETOCONAZOLE 20 MG/ML
SHAMPOO TOPICAL
Qty: 120 ML | Refills: 11 | Status: SHIPPED | OUTPATIENT
Start: 2022-12-06

## 2022-12-06 RX ORDER — CLINDAMYCIN PHOSPHATE 11.9 MG/ML
SOLUTION TOPICAL
Qty: 60 ML | Refills: 11 | Status: SHIPPED | OUTPATIENT
Start: 2022-12-06

## 2022-12-06 ASSESSMENT — PAIN SCALES - GENERAL: PAINLEVEL: NO PAIN (0)

## 2022-12-06 NOTE — NURSING NOTE
Dermatology Rooming Note    Laurel Santoyo's goals for this visit include:   Chief Complaint   Patient presents with     Skin Check     Scabs on scalp- Laurel states the scabs come and go and dry patches all over body      Lea Banuelos, Visit Facilitator

## 2022-12-06 NOTE — PATIENT INSTRUCTIONS
Start ketoconazole 2% shampoo at least twice weekly; let sit for 2-3 minutes before rinsing.   Can use clindamycin 1% solution at nighttime before bed as needed for active areas.   Can consider over-the-counter N-acetyl-cysteine (NAC) 600 mg (1 tablet) twice daily.

## 2022-12-06 NOTE — PROGRESS NOTES
[facility] Dermatology Note  Encounter Date: Dec 6, 2022  Office Visit     Dermatology Problem List:  1. 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    - Due to the immunosuppressive medications used following transplant, solid organ transplant recipients are at a roughly 65-fold increased risk of squamous cell carcinoma, 20-fold increased risk of basal cell carcinoma, and 3.4 fold increased risk of melanoma compared to the general population.   - Based on the patient's risk factors and the Skin and Ultraviolet Neoplasia Transplant Risk Assessment Tool, the patient's risk of skin cancer following transplant is categorized as:  - Medium Risk: 5-Year Risk of 6.15% and 10-Year Risk of 13.73%  - Prevention methods for reducing skin cancer after transplantation include avoidance of sun exposure, avoidance of indoor tanning beds or other indoor tanning devices, use of protective clothing (e.g. wide-brimmed hats, long sleeves, long pants), SPF 30 or greater sunscreen, and UV-protective sunglasses when outdoors.   - Recommended FBSE at follow-up in June 2023.     # Folliculitis, scalp. Chronic, flare.   # Seborrheic dermatitis. Chronic, flare.   Likely exacerbated in setting of chronic picking behaviors which patients endorses. Recommended trial of topical therapies. Also discussed NAC to reduce picking behaviors.   - Start ketoconazole 2% shampoo twice weekly  - Start clindamycin 1% solution nightly prn  - Discussed OTC N-acetyl-cysteine (NAC) 600 mg BID to reduce picking behaviors    # Atopic dermatitis/keratosis pilaris. Chronic, flare.   - Start triamcinolone 0.1% ointment BID prn for flares on trunk and extremities, limiting use to no more than two weeks at a time.  - Diligent moisturization    Procedures Performed:  "  None.    Follow-up: June 2023 in-person for FBSE, or earlier for new or changing lesions    Staff and Scribe:     Scribe Disclosure:  I, Richardson Hidalgo, am serving as a scribe to document services personally performed by Rolando Ramirez MD based on data collection and the provider's statements to me.     Provider Disclosure:   The documentation recorded by the scribe accurately reflects the services I personally performed and the decisions made by me.    Rolando Ramirez MD    Department of Dermatology  [facility]: Phone: [phone], Fax:[phone]  [facility]: Phone: [phone] Fax: [phone]  ____________________________________________    CC: Skin Check (Scabs on scalp- Laurel states the scabs come and go and dry patches all over body )    HPI:    Laurel Santoyo is a(n) 22 year old adult who presents today as a return patient for a skin check. They have a history of heart transplant in setting of congenital heart disease. Last seen in Dermatology on 6/15/22 by me, at which time patient was recommended ammonium lactate for treatment of keratin pilaris.    Today, patient reports scabs on the back of scalp that they picked and did not let heal. They report that the scabs hurt when picked and that they didn't drain. Patient reports that this has happened as frequently as once per week. Patient reports using \"Love, Beauty and Planet\" shampoo. Patient believes she gets flakes, not dandruff and uses Dove dandruff conditioner. Patient avoids sulfates.     Patient is otherwise feeling well, without additional skin concerns.    Labs Reviewed:  N/A    Physical Exam:  Vitals: There were no vitals taken for this visit.  SKIN: Focused examination of scalp and arms was performed.  - Mild pink erythema and scale on scalp  - Few scattered pink eczematous scaly papules on the left clavicle and " upper extremities  - No other lesions of concern on areas examined.     Medications:  Current Outpatient Medications   Medication     albuterol (PROAIR HFA) 108 (90 Base) MCG/ACT inhaler     amoxicillin (AMOXIL) 500 MG capsule     aspirin EC 81 MG EC tablet     biotin 1000 MCG TABS tablet     clindamycin (CLEOCIN T) 1 % external solution     fluticasone (FLONASE) 50 MCG/ACT nasal spray     ketoconazole (NIZORAL) 2 % external shampoo     lisinopril (ZESTRIL) 10 MG tablet     Magnesium 400 MG TABS     rosuvastatin (CRESTOR) 10 MG tablet     sertraline (ZOLOFT) 100 MG tablet     tacrolimus (GENERIC EQUIVALENT) 0.5 MG capsule     triamcinolone (KENALOG) 0.1 % external ointment     vitamin D3 (CHOLECALCIFEROL) 50 mcg (2000 units) tablet     No current facility-administered medications for this visit.      Past Medical History:   Patient Active Problem List   Diagnosis     IMMUNIZATION HISTORY     **Need for SBE (subacute bacterial endocarditis) prophylaxis     Heart replaced by transplant (H)     Vaccine contraindicated due to immunosuppression - NO LIVE VACCINES/ **NO MMR, NO VARICELLA, NO LIVE INFLUENZA**     PTSD (post-traumatic stress disorder)     Severe episode of recurrent major depressive disorder, without psychotic features (H)     Insomnia     History of colitis     Coronary artery disease involving transplanted heart     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

## 2022-12-06 NOTE — LETTER
12/6/2022       RE: Laurel Santoyo  [address] [place] [place] [address]     Dear Colleague,    Thank you for referring your patient, Laurel Santoyo, to the [facility] [place] [facility]. Please see a copy of my visit note below.    [facility] Dermatology Note  Encounter Date: Dec 6, 2022  Office Visit     Dermatology Problem List:  1. 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    - Due to the immunosuppressive medications used following transplant, solid organ transplant recipients are at a roughly 65-fold increased risk of squamous cell carcinoma, 20-fold increased risk of basal cell carcinoma, and 3.4 fold increased risk of melanoma compared to the general population.   - Based on the patient's risk factors and the Skin and Ultraviolet Neoplasia Transplant Risk Assessment Tool, the patient's risk of skin cancer following transplant is categorized as:  - Medium Risk: 5-Year Risk of 6.15% and 10-Year Risk of 13.73%  - Prevention methods for reducing skin cancer after transplantation include avoidance of sun exposure, avoidance of indoor tanning beds or other indoor tanning devices, use of protective clothing (e.g. wide-brimmed hats, long sleeves, long pants), SPF 30 or greater sunscreen, and UV-protective sunglasses when outdoors.   - Recommended FBSE at follow-up in June 2023.     # Folliculitis, scalp. Chronic, flare.   # Seborrheic dermatitis. Chronic, flare.   Likely exacerbated in setting of chronic picking behaviors which patients endorses. Recommended trial of topical therapies. Also discussed NAC to reduce picking behaviors.   - Start ketoconazole 2% shampoo twice weekly  - Start clindamycin 1% solution nightly prn  -  "Discussed OTC N-acetyl-cysteine (NAC) 600 mg BID to reduce picking behaviors    # Atopic dermatitis/keratosis pilaris. Chronic, flare.   - Start triamcinolone 0.1% ointment BID prn for flares on trunk and extremities, limiting use to no more than two weeks at a time.  - Diligent moisturization    Procedures Performed:   None.    Follow-up: June 2023 in-person for FBSE, or earlier for new or changing lesions    Staff and Scribe:     Scribe Disclosure:  I, Richardson Hidalgo, am serving as a scribe to document services personally performed by Rolando Ramirez MD based on data collection and the provider's statements to me.     Provider Disclosure:   The documentation recorded by the scribe accurately reflects the services I personally performed and the decisions made by me.    Rolando Ramirez MD    Department of Dermatology  [facility] [place][note 2]: Phone: [phone], Fax:[phone]  [facility]: Phone: [phone] Fax: [phone]  ____________________________________________    CC: Skin Check (Scabs on scalp- Laurel states the scabs come and go and dry patches all over body )    HPI:    Laurel Santoyo is a(n) 22 year old adult who presents today as a return patient for a skin check. They have a history of heart transplant in setting of congenital heart disease. Last seen in Dermatology on 6/15/22 by me, at which time patient was recommended ammonium lactate for treatment of keratin pilaris.    Today, patient reports scabs on the back of scalp that they picked and did not let heal. They report that the scabs hurt when picked and that they didn't drain. Patient reports that this has happened as frequently as once per week. Patient reports using \"Love, Beauty and Planet\" shampoo. Patient believes she gets flakes, not dandruff and uses Dove dandruff conditioner. Patient avoids sulfates.     Patient is " otherwise feeling well, without additional skin concerns.    Labs Reviewed:  N/A    Physical Exam:  Vitals: There were no vitals taken for this visit.  SKIN: Focused examination of scalp and arms was performed.  - Mild pink erythema and scale on scalp  - Few scattered pink eczematous scaly papules on the left clavicle and upper extremities  - No other lesions of concern on areas examined.     Medications:  Current Outpatient Medications   Medication     albuterol (PROAIR HFA) 108 (90 Base) MCG/ACT inhaler     amoxicillin (AMOXIL) 500 MG capsule     aspirin EC 81 MG EC tablet     biotin 1000 MCG TABS tablet     clindamycin (CLEOCIN T) 1 % external solution     fluticasone (FLONASE) 50 MCG/ACT nasal spray     ketoconazole (NIZORAL) 2 % external shampoo     lisinopril (ZESTRIL) 10 MG tablet     Magnesium 400 MG TABS     rosuvastatin (CRESTOR) 10 MG tablet     sertraline (ZOLOFT) 100 MG tablet     tacrolimus (GENERIC EQUIVALENT) 0.5 MG capsule     triamcinolone (KENALOG) 0.1 % external ointment     vitamin D3 (CHOLECALCIFEROL) 50 mcg (2000 units) tablet     No current facility-administered medications for this visit.      Past Medical History:   Patient Active Problem List   Diagnosis     IMMUNIZATION HISTORY     **Need for SBE (subacute bacterial endocarditis) prophylaxis     Heart replaced by transplant (H)     Vaccine contraindicated due to immunosuppression - NO LIVE VACCINES/ **NO MMR, NO VARICELLA, NO LIVE INFLUENZA**     PTSD (post-traumatic stress disorder)     Severe episode of recurrent major depressive disorder, without psychotic features (H)     Insomnia     History of colitis     Coronary artery disease involving transplanted heart     Past Medical History:   Diagnosis Date     Heart transplant, orthotopic, status  2/2004 08/14/2012     HLHS (hypoplastic left heart syndrome) 08/22/2007    Hx of Heart Transplant in 2/2004 at age 3 years Nov, 2008 sub-aortic membrane removal      PONV (postoperative nausea  and vomiting)      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      Sincerely,    Rolando Ramirez MD

## 2023-01-01 ENCOUNTER — TELEPHONE (OUTPATIENT)
Dept: TRANSPLANT | Facility: CLINIC | Age: 23
End: 2023-01-01
Payer: COMMERCIAL

## 2023-01-01 ENCOUNTER — APPOINTMENT (OUTPATIENT)
Dept: MEDSURG UNIT | Facility: CLINIC | Age: 23
End: 2023-01-01
Attending: INTERNAL MEDICINE
Payer: COMMERCIAL

## 2023-01-01 ENCOUNTER — OFFICE VISIT (OUTPATIENT)
Dept: CARDIOLOGY | Facility: CLINIC | Age: 23
End: 2023-01-01
Attending: STUDENT IN AN ORGANIZED HEALTH CARE EDUCATION/TRAINING PROGRAM
Payer: COMMERCIAL

## 2023-01-01 ENCOUNTER — HOSPITAL ENCOUNTER (OUTPATIENT)
Facility: CLINIC | Age: 23
Discharge: HOME | End: 2023-04-13
Attending: INTERNAL MEDICINE | Admitting: INTERNAL MEDICINE
Payer: COMMERCIAL

## 2023-01-01 ENCOUNTER — HEALTH MAINTENANCE LETTER (OUTPATIENT)
Age: 23
End: 2023-01-01

## 2023-01-01 ENCOUNTER — TELEPHONE (OUTPATIENT)
Dept: CARDIOLOGY | Facility: CLINIC | Age: 23
End: 2023-01-01
Payer: COMMERCIAL

## 2023-01-01 ENCOUNTER — DOCUMENTATION ONLY (OUTPATIENT)
Dept: OTHER | Facility: CLINIC | Age: 23
End: 2023-01-01

## 2023-01-01 ENCOUNTER — APPOINTMENT (OUTPATIENT)
Dept: LAB | Facility: CLINIC | Age: 23
End: 2023-01-01
Attending: INTERNAL MEDICINE
Payer: COMMERCIAL

## 2023-01-01 ENCOUNTER — ANCILLARY PROCEDURE (OUTPATIENT)
Dept: GENERAL RADIOLOGY | Facility: CLINIC | Age: 23
End: 2023-01-01
Attending: STUDENT IN AN ORGANIZED HEALTH CARE EDUCATION/TRAINING PROGRAM
Payer: COMMERCIAL

## 2023-01-01 ENCOUNTER — OFFICE VISIT (OUTPATIENT)
Dept: DERMATOLOGY | Facility: CLINIC | Age: 23
End: 2023-01-01
Payer: COMMERCIAL

## 2023-01-01 ENCOUNTER — PRE VISIT (OUTPATIENT)
Dept: INFECTIOUS DISEASES | Facility: CLINIC | Age: 23
End: 2023-01-01
Payer: COMMERCIAL

## 2023-01-01 ENCOUNTER — NURSE TRIAGE (OUTPATIENT)
Dept: NURSING | Facility: CLINIC | Age: 23
End: 2023-01-01
Payer: COMMERCIAL

## 2023-01-01 ENCOUNTER — DOCUMENTATION ONLY (OUTPATIENT)
Dept: TRANSPLANT | Facility: CLINIC | Age: 23
End: 2023-01-01
Payer: COMMERCIAL

## 2023-01-01 VITALS
HEIGHT: 65 IN | DIASTOLIC BLOOD PRESSURE: 81 MMHG | HEART RATE: 84 BPM | OXYGEN SATURATION: 95 % | SYSTOLIC BLOOD PRESSURE: 127 MMHG | BODY MASS INDEX: 21.54 KG/M2 | WEIGHT: 129.3 LBS

## 2023-01-01 VITALS
SYSTOLIC BLOOD PRESSURE: 121 MMHG | WEIGHT: 128 LBS | TEMPERATURE: 98.2 F | OXYGEN SATURATION: 96 % | HEART RATE: 74 BPM | HEIGHT: 65 IN | RESPIRATION RATE: 16 BRPM | BODY MASS INDEX: 21.33 KG/M2 | DIASTOLIC BLOOD PRESSURE: 71 MMHG

## 2023-01-01 DIAGNOSIS — L20.9 ATOPIC DERMATITIS, UNSPECIFIED TYPE: ICD-10-CM

## 2023-01-01 DIAGNOSIS — Z94.1 HEART REPLACED BY TRANSPLANT (H): ICD-10-CM

## 2023-01-01 DIAGNOSIS — Z94.1 TRANSPLANTED HEART (H): ICD-10-CM

## 2023-01-01 DIAGNOSIS — Z13.29 THYROID DISORDER SCREENING: ICD-10-CM

## 2023-01-01 DIAGNOSIS — T86.290 CARDIAC ALLOGRAFT VASCULOPATHY (H): ICD-10-CM

## 2023-01-01 DIAGNOSIS — E55.9 VITAMIN D INSUFFICIENCY: ICD-10-CM

## 2023-01-01 DIAGNOSIS — E11.9 DIABETES MELLITUS (H): ICD-10-CM

## 2023-01-01 DIAGNOSIS — L81.4 SOLAR LENTIGO: ICD-10-CM

## 2023-01-01 DIAGNOSIS — Z94.1 TRANSPLANTED HEART (H): Primary | ICD-10-CM

## 2023-01-01 DIAGNOSIS — Z79.899 ENCOUNTER FOR LONG-TERM (CURRENT) USE OF HIGH-RISK MEDICATION: ICD-10-CM

## 2023-01-01 DIAGNOSIS — D22.9 NEVUS SPILUS: ICD-10-CM

## 2023-01-01 DIAGNOSIS — F43.10 PTSD (POST-TRAUMATIC STRESS DISORDER): ICD-10-CM

## 2023-01-01 DIAGNOSIS — Z95.811 HEART REPLACED (H): Primary | ICD-10-CM

## 2023-01-01 DIAGNOSIS — Z13.220 LIPID SCREENING: ICD-10-CM

## 2023-01-01 DIAGNOSIS — F33.2 SEVERE EPISODE OF RECURRENT MAJOR DEPRESSIVE DISORDER, WITHOUT PSYCHOTIC FEATURES (H): ICD-10-CM

## 2023-01-01 DIAGNOSIS — D84.9 IMMUNOSUPPRESSION (H): ICD-10-CM

## 2023-01-01 DIAGNOSIS — B27.90 EBV INFECTION: Primary | ICD-10-CM

## 2023-01-01 DIAGNOSIS — D22.9 MULTIPLE BENIGN NEVI: ICD-10-CM

## 2023-01-01 DIAGNOSIS — F41.1 GAD (GENERALIZED ANXIETY DISORDER): ICD-10-CM

## 2023-01-01 DIAGNOSIS — B27.00 EBV (EPSTEIN-BARR VIRUS) VIREMIA: ICD-10-CM

## 2023-01-01 DIAGNOSIS — Z94.1 HEART TRANSPLANTED (H): ICD-10-CM

## 2023-01-01 DIAGNOSIS — I25.811 CORONARY ARTERY DISEASE INVOLVING NATIVE ARTERY OF TRANSPLANTED HEART WITHOUT ANGINA PECTORIS: Primary | ICD-10-CM

## 2023-01-01 LAB
ALBUMIN SERPL BCG-MCNC: 4.2 G/DL (ref 3.5–5.2)
ALP SERPL-CCNC: 54 U/L (ref 35–129)
ALT SERPL W P-5'-P-CCNC: 8 U/L (ref 10–50)
ANION GAP SERPL CALCULATED.3IONS-SCNC: 17 MMOL/L (ref 7–15)
AST SERPL W P-5'-P-CCNC: 21 U/L (ref 10–50)
ATRIAL RATE - MUSE: 83 BPM
BASOPHILS # BLD AUTO: 0 10E3/UL (ref 0–0.2)
BASOPHILS NFR BLD AUTO: 0 %
BILIRUB SERPL-MCNC: 0.3 MG/DL
BUN SERPL-MCNC: 18.4 MG/DL (ref 6–20)
CALCIUM SERPL-MCNC: 9.4 MG/DL (ref 8.6–10)
CHLORIDE SERPL-SCNC: 102 MMOL/L (ref 98–107)
CHOLEST SERPL-MCNC: 112 MG/DL
CK SERPL-CCNC: 33 U/L (ref 26–308)
CMV DNA SPEC NAA+PROBE-ACNC: NOT DETECTED IU/ML
CREAT SERPL-MCNC: 0.9 MG/DL (ref 0.51–1.17)
DEPRECATED HCO3 PLAS-SCNC: 19 MMOL/L (ref 22–29)
DIASTOLIC BLOOD PRESSURE - MUSE: NORMAL MMHG
DONOR IDENTIFICATION: NORMAL
DSA COMMENTS: NORMAL
DSA PRESENT: NO
DSA TEST METHOD: NORMAL
EBV DNA COPIES/ML, INSTRUMENT: ABNORMAL COPIES/ML
EBV DNA SPEC NAA+PROBE-LOG#: 5.7 {LOG_COPIES}/ML
EOSINOPHIL # BLD AUTO: 0.6 10E3/UL (ref 0–0.7)
EOSINOPHIL NFR BLD AUTO: 10 %
ERYTHROCYTE [DISTWIDTH] IN BLOOD BY AUTOMATED COUNT: 17.2 % (ref 10–15)
GFR SERPL CREATININE-BSD FRML MDRD: >90 ML/MIN/1.73M2
GLUCOSE SERPL-MCNC: 99 MG/DL (ref 70–99)
HBA1C MFR BLD: 6.1 %
HCG INTACT+B SERPL-ACNC: <1 MIU/ML
HCT VFR BLD AUTO: 37.8 % (ref 35–53)
HDLC SERPL-MCNC: 45 MG/DL
HGB BLD-MCNC: 10.1 G/DL (ref 11.7–17.7)
HGB BLD-MCNC: 11 G/DL (ref 11.7–17.7)
IMM GRANULOCYTES # BLD: 0 10E3/UL
IMM GRANULOCYTES NFR BLD: 0 %
IMMUKNOW IMMUNE CELL FUNCTION: 318 NG/ML
INTERPRETATION ECG - MUSE: NORMAL
LDLC SERPL CALC-MCNC: 49 MG/DL
LVEF ECHO: NORMAL
LYMPHOCYTES # BLD AUTO: 3.1 10E3/UL (ref 0.8–5.3)
LYMPHOCYTES NFR BLD AUTO: 50 %
MAGNESIUM SERPL-MCNC: 1.6 MG/DL (ref 1.7–2.3)
MCH RBC QN AUTO: 21.1 PG (ref 26.5–33)
MCHC RBC AUTO-ENTMCNC: 29.1 G/DL (ref 31.5–36.5)
MCV RBC AUTO: 73 FL (ref 78–100)
MONOCYTES # BLD AUTO: 0.6 10E3/UL (ref 0–1.3)
MONOCYTES NFR BLD AUTO: 9 %
NEUTROPHILS # BLD AUTO: 2 10E3/UL (ref 1.6–8.3)
NEUTROPHILS NFR BLD AUTO: 31 %
NONHDLC SERPL-MCNC: 67 MG/DL
NRBC # BLD AUTO: 0 10E3/UL
NRBC BLD AUTO-RTO: 0 /100
ORGAN: NORMAL
OXYHGB MFR BLDV: 55 % (ref 92–100)
P AXIS - MUSE: 52 DEGREES
PHOSPHATE SERPL-MCNC: 4.3 MG/DL (ref 2.5–4.5)
PLATELET # BLD AUTO: 235 10E3/UL (ref 150–450)
POTASSIUM SERPL-SCNC: 4 MMOL/L (ref 3.4–5.3)
PR INTERVAL - MUSE: 126 MS
PROT SERPL-MCNC: 8.1 G/DL (ref 6.4–8.3)
QRS DURATION - MUSE: 146 MS
QT - MUSE: 416 MS
QTC - MUSE: 488 MS
R AXIS - MUSE: 105 DEGREES
RBC # BLD AUTO: 5.21 10E6/UL (ref 3.8–5.9)
SA 1 CELL: NORMAL
SA 1 TEST METHOD: NORMAL
SA 2 CELL: NORMAL
SA 2 TEST METHOD: NORMAL
SA1 HI RISK ABY: NORMAL
SA1 MOD RISK ABY: NORMAL
SA2 HI RISK ABY: NORMAL
SA2 MOD RISK ABY: NORMAL
SODIUM SERPL-SCNC: 138 MMOL/L (ref 136–145)
SYSTOLIC BLOOD PRESSURE - MUSE: NORMAL MMHG
T AXIS - MUSE: -55 DEGREES
TACROLIMUS BLD-MCNC: 5.1 UG/L (ref 5–15)
TME LAST DOSE: NORMAL H
TME LAST DOSE: NORMAL H
TRIGL SERPL-MCNC: 88 MG/DL
TSH SERPL DL<=0.005 MIU/L-ACNC: 2.96 UIU/ML (ref 0.3–4.2)
UNACCEPTABLE ANTIGENS: NORMAL
VENTRICULAR RATE- MUSE: 83 BPM
WBC # BLD AUTO: 6.3 10E3/UL (ref 4–11)
ZZZSA 1  COMMENTS: NORMAL
ZZZSA 2 COMMENTS: NORMAL

## 2023-01-01 PROCEDURE — 83036 HEMOGLOBIN GLYCOSYLATED A1C: CPT | Performed by: STUDENT IN AN ORGANIZED HEALTH CARE EDUCATION/TRAINING PROGRAM

## 2023-01-01 PROCEDURE — 84100 ASSAY OF PHOSPHORUS: CPT | Performed by: STUDENT IN AN ORGANIZED HEALTH CARE EDUCATION/TRAINING PROGRAM

## 2023-01-01 PROCEDURE — 999N000054 HC STATISTIC EKG NON-CHARGEABLE

## 2023-01-01 PROCEDURE — 93456 R HRT CORONARY ARTERY ANGIO: CPT | Performed by: INTERNAL MEDICINE

## 2023-01-01 PROCEDURE — 80053 COMPREHEN METABOLIC PANEL: CPT | Performed by: STUDENT IN AN ORGANIZED HEALTH CARE EDUCATION/TRAINING PROGRAM

## 2023-01-01 PROCEDURE — 85025 COMPLETE CBC W/AUTO DIFF WBC: CPT | Performed by: STUDENT IN AN ORGANIZED HEALTH CARE EDUCATION/TRAINING PROGRAM

## 2023-01-01 PROCEDURE — C1887 CATHETER, GUIDING: HCPCS | Performed by: INTERNAL MEDICINE

## 2023-01-01 PROCEDURE — 93306 TTE W/DOPPLER COMPLETE: CPT | Performed by: INTERNAL MEDICINE

## 2023-01-01 PROCEDURE — 99152 MOD SED SAME PHYS/QHP 5/>YRS: CPT | Mod: GC | Performed by: INTERNAL MEDICINE

## 2023-01-01 PROCEDURE — 36415 COLL VENOUS BLD VENIPUNCTURE: CPT | Performed by: STUDENT IN AN ORGANIZED HEALTH CARE EDUCATION/TRAINING PROGRAM

## 2023-01-01 PROCEDURE — 999N000134 HC STATISTIC PP CARE STAGE 3

## 2023-01-01 PROCEDURE — 84702 CHORIONIC GONADOTROPIN TEST: CPT | Performed by: STUDENT IN AN ORGANIZED HEALTH CARE EDUCATION/TRAINING PROGRAM

## 2023-01-01 PROCEDURE — 99152 MOD SED SAME PHYS/QHP 5/>YRS: CPT | Performed by: INTERNAL MEDICINE

## 2023-01-01 PROCEDURE — 87799 DETECT AGENT NOS DNA QUANT: CPT | Performed by: STUDENT IN AN ORGANIZED HEALTH CARE EDUCATION/TRAINING PROGRAM

## 2023-01-01 PROCEDURE — 71046 X-RAY EXAM CHEST 2 VIEWS: CPT | Mod: GC | Performed by: RADIOLOGY

## 2023-01-01 PROCEDURE — 250N000011 HC RX IP 250 OP 636: Performed by: INTERNAL MEDICINE

## 2023-01-01 PROCEDURE — 82810 BLOOD GASES O2 SAT ONLY: CPT

## 2023-01-01 PROCEDURE — 80197 ASSAY OF TACROLIMUS: CPT | Performed by: STUDENT IN AN ORGANIZED HEALTH CARE EDUCATION/TRAINING PROGRAM

## 2023-01-01 PROCEDURE — 83735 ASSAY OF MAGNESIUM: CPT | Performed by: STUDENT IN AN ORGANIZED HEALTH CARE EDUCATION/TRAINING PROGRAM

## 2023-01-01 PROCEDURE — 86352 CELL FUNCTION ASSAY W/STIM: CPT | Performed by: STUDENT IN AN ORGANIZED HEALTH CARE EDUCATION/TRAINING PROGRAM

## 2023-01-01 PROCEDURE — 86832 HLA CLASS I HIGH DEFIN QUAL: CPT | Performed by: STUDENT IN AN ORGANIZED HEALTH CARE EDUCATION/TRAINING PROGRAM

## 2023-01-01 PROCEDURE — 999N000142 HC STATISTIC PROCEDURE PREP ONLY

## 2023-01-01 PROCEDURE — 80061 LIPID PANEL: CPT | Performed by: STUDENT IN AN ORGANIZED HEALTH CARE EDUCATION/TRAINING PROGRAM

## 2023-01-01 PROCEDURE — 86833 HLA CLASS II HIGH DEFIN QUAL: CPT | Performed by: STUDENT IN AN ORGANIZED HEALTH CARE EDUCATION/TRAINING PROGRAM

## 2023-01-01 PROCEDURE — 93005 ELECTROCARDIOGRAM TRACING: CPT

## 2023-01-01 PROCEDURE — 250N000009 HC RX 250: Performed by: INTERNAL MEDICINE

## 2023-01-01 PROCEDURE — 250N000013 HC RX MED GY IP 250 OP 250 PS 637: Performed by: STUDENT IN AN ORGANIZED HEALTH CARE EDUCATION/TRAINING PROGRAM

## 2023-01-01 PROCEDURE — C1894 INTRO/SHEATH, NON-LASER: HCPCS | Performed by: INTERNAL MEDICINE

## 2023-01-01 PROCEDURE — 93010 ELECTROCARDIOGRAM REPORT: CPT | Performed by: INTERNAL MEDICINE

## 2023-01-01 PROCEDURE — 272N000001 HC OR GENERAL SUPPLY STERILE: Performed by: INTERNAL MEDICINE

## 2023-01-01 PROCEDURE — 99153 MOD SED SAME PHYS/QHP EA: CPT | Performed by: INTERNAL MEDICINE

## 2023-01-01 PROCEDURE — 82550 ASSAY OF CK (CPK): CPT | Performed by: STUDENT IN AN ORGANIZED HEALTH CARE EDUCATION/TRAINING PROGRAM

## 2023-01-01 PROCEDURE — 250N000009 HC RX 250: Performed by: STUDENT IN AN ORGANIZED HEALTH CARE EDUCATION/TRAINING PROGRAM

## 2023-01-01 PROCEDURE — 99213 OFFICE O/P EST LOW 20 MIN: CPT | Performed by: DERMATOLOGY

## 2023-01-01 PROCEDURE — 84443 ASSAY THYROID STIM HORMONE: CPT | Performed by: STUDENT IN AN ORGANIZED HEALTH CARE EDUCATION/TRAINING PROGRAM

## 2023-01-01 PROCEDURE — 85018 HEMOGLOBIN: CPT

## 2023-01-01 PROCEDURE — 99212 OFFICE O/P EST SF 10 MIN: CPT | Performed by: STUDENT IN AN ORGANIZED HEALTH CARE EDUCATION/TRAINING PROGRAM

## 2023-01-01 PROCEDURE — 93456 R HRT CORONARY ARTERY ANGIO: CPT | Mod: 26 | Performed by: INTERNAL MEDICINE

## 2023-01-01 PROCEDURE — 99215 OFFICE O/P EST HI 40 MIN: CPT | Mod: 25 | Performed by: STUDENT IN AN ORGANIZED HEALTH CARE EDUCATION/TRAINING PROGRAM

## 2023-01-01 RX ORDER — NALOXONE HYDROCHLORIDE 0.4 MG/ML
0.2 INJECTION, SOLUTION INTRAMUSCULAR; INTRAVENOUS; SUBCUTANEOUS
Status: DISCONTINUED | OUTPATIENT
Start: 2023-01-01 | End: 2023-01-01 | Stop reason: HOSPADM

## 2023-01-01 RX ORDER — FENTANYL CITRATE 50 UG/ML
25 INJECTION, SOLUTION INTRAMUSCULAR; INTRAVENOUS
Status: DISCONTINUED | OUTPATIENT
Start: 2023-01-01 | End: 2023-01-01 | Stop reason: HOSPADM

## 2023-01-01 RX ORDER — LIDOCAINE 40 MG/G
CREAM TOPICAL
Status: CANCELLED | OUTPATIENT
Start: 2023-01-01

## 2023-01-01 RX ORDER — TACROLIMUS 0.5 MG/1
CAPSULE ORAL
Qty: 120 CAPSULE | Refills: 11 | Status: SHIPPED | OUTPATIENT
Start: 2023-01-01

## 2023-01-01 RX ORDER — NALOXONE HYDROCHLORIDE 0.4 MG/ML
0.4 INJECTION, SOLUTION INTRAMUSCULAR; INTRAVENOUS; SUBCUTANEOUS
Status: DISCONTINUED | OUTPATIENT
Start: 2023-01-01 | End: 2023-01-01 | Stop reason: HOSPADM

## 2023-01-01 RX ORDER — ASPIRIN 81 MG/1
243 TABLET, CHEWABLE ORAL ONCE
Status: COMPLETED | OUTPATIENT
Start: 2023-01-01 | End: 2023-01-01

## 2023-01-01 RX ORDER — ASPIRIN 81 MG/1
243 TABLET, CHEWABLE ORAL ONCE
Status: CANCELLED | OUTPATIENT
Start: 2023-01-01

## 2023-01-01 RX ORDER — LIDOCAINE 40 MG/G
CREAM TOPICAL
Status: DISCONTINUED | OUTPATIENT
Start: 2023-01-01 | End: 2023-01-01 | Stop reason: HOSPADM

## 2023-01-01 RX ORDER — ROSUVASTATIN CALCIUM 10 MG/1
10 TABLET, COATED ORAL DAILY
Qty: 90 TABLET | Refills: 3 | Status: SHIPPED | OUTPATIENT
Start: 2023-01-01

## 2023-01-01 RX ORDER — OXYCODONE HYDROCHLORIDE 10 MG/1
10 TABLET ORAL EVERY 4 HOURS PRN
Status: DISCONTINUED | OUTPATIENT
Start: 2023-01-01 | End: 2023-01-01 | Stop reason: HOSPADM

## 2023-01-01 RX ORDER — CHOLECALCIFEROL (VITAMIN D3) 50 MCG
50 TABLET ORAL DAILY
Qty: 90 TABLET | Refills: 3 | OUTPATIENT
Start: 2023-01-01

## 2023-01-01 RX ORDER — CHOLECALCIFEROL (VITAMIN D3) 50 MCG
50 TABLET ORAL DAILY
Qty: 90 TABLET | Refills: 3 | Status: SHIPPED | OUTPATIENT
Start: 2023-01-01

## 2023-01-01 RX ORDER — FENTANYL CITRATE 50 UG/ML
INJECTION, SOLUTION INTRAMUSCULAR; INTRAVENOUS
Status: DISCONTINUED | OUTPATIENT
Start: 2023-01-01 | End: 2023-01-01 | Stop reason: HOSPADM

## 2023-01-01 RX ORDER — HEPARIN SODIUM 1000 [USP'U]/ML
INJECTION, SOLUTION INTRAVENOUS; SUBCUTANEOUS
Status: DISCONTINUED | OUTPATIENT
Start: 2023-01-01 | End: 2023-01-01 | Stop reason: HOSPADM

## 2023-01-01 RX ORDER — ATROPINE SULFATE 0.1 MG/ML
0.5 INJECTION INTRAVENOUS
Status: DISCONTINUED | OUTPATIENT
Start: 2023-01-01 | End: 2023-01-01 | Stop reason: HOSPADM

## 2023-01-01 RX ORDER — ASPIRIN 325 MG
325 TABLET ORAL ONCE
Status: COMPLETED | OUTPATIENT
Start: 2023-01-01 | End: 2023-01-01

## 2023-01-01 RX ORDER — MULTIPLE VITAMINS W/ MINERALS TAB 9MG-400MCG
1 TAB ORAL DAILY
COMMUNITY

## 2023-01-01 RX ORDER — MAGNESIUM OXIDE 400 MG/1
800 TABLET ORAL ONCE
Status: DISCONTINUED | OUTPATIENT
Start: 2023-01-01 | End: 2023-01-01 | Stop reason: HOSPADM

## 2023-01-01 RX ORDER — IOPAMIDOL 755 MG/ML
INJECTION, SOLUTION INTRAVASCULAR
Status: DISCONTINUED | OUTPATIENT
Start: 2023-01-01 | End: 2023-01-01 | Stop reason: HOSPADM

## 2023-01-01 RX ORDER — SODIUM CHLORIDE 9 MG/ML
INJECTION, SOLUTION INTRAVENOUS CONTINUOUS
Status: CANCELLED | OUTPATIENT
Start: 2023-01-01

## 2023-01-01 RX ORDER — NICARDIPINE HYDROCHLORIDE 2.5 MG/ML
INJECTION INTRAVENOUS
Status: DISCONTINUED | OUTPATIENT
Start: 2023-01-01 | End: 2023-01-01 | Stop reason: HOSPADM

## 2023-01-01 RX ORDER — FLUMAZENIL 0.1 MG/ML
0.2 INJECTION, SOLUTION INTRAVENOUS
Status: DISCONTINUED | OUTPATIENT
Start: 2023-01-01 | End: 2023-01-01 | Stop reason: HOSPADM

## 2023-01-01 RX ORDER — TRIAMCINOLONE ACETONIDE 1 MG/G
OINTMENT TOPICAL
Qty: 80 G | Refills: 11 | Status: SHIPPED | OUTPATIENT
Start: 2023-01-01

## 2023-01-01 RX ORDER — SERTRALINE HYDROCHLORIDE 100 MG/1
100 TABLET, FILM COATED ORAL DAILY
Qty: 30 TABLET | Refills: 1 | Status: SHIPPED | OUTPATIENT
Start: 2023-01-01

## 2023-01-01 RX ORDER — OXYCODONE HYDROCHLORIDE 5 MG/1
5 TABLET ORAL EVERY 4 HOURS PRN
Status: DISCONTINUED | OUTPATIENT
Start: 2023-01-01 | End: 2023-01-01 | Stop reason: HOSPADM

## 2023-01-01 RX ORDER — SODIUM CHLORIDE 9 MG/ML
INJECTION, SOLUTION INTRAVENOUS CONTINUOUS
Status: DISCONTINUED | OUTPATIENT
Start: 2023-01-01 | End: 2023-01-01 | Stop reason: HOSPADM

## 2023-01-01 RX ORDER — ASPIRIN 325 MG
325 TABLET ORAL ONCE
Status: CANCELLED | OUTPATIENT
Start: 2023-01-01 | End: 2023-01-01

## 2023-01-01 RX ORDER — LISINOPRIL 10 MG/1
TABLET ORAL
Qty: 90 TABLET | Refills: 3 | Status: SHIPPED | OUTPATIENT
Start: 2023-01-01

## 2023-01-01 RX ORDER — NITROGLYCERIN 5 MG/ML
VIAL (ML) INTRAVENOUS
Status: DISCONTINUED | OUTPATIENT
Start: 2023-01-01 | End: 2023-01-01 | Stop reason: HOSPADM

## 2023-01-01 RX ADMIN — LIDOCAINE: 40 CREAM TOPICAL at 07:59

## 2023-01-01 RX ADMIN — ASPIRIN 325 MG ORAL TABLET 325 MG: 325 PILL ORAL at 07:54

## 2023-01-01 ASSESSMENT — ACTIVITIES OF DAILY LIVING (ADL)
ADLS_ACUITY_SCORE: 37

## 2023-01-01 ASSESSMENT — PAIN SCALES - GENERAL
PAINLEVEL: NO PAIN (0)
PAINLEVEL: NO PAIN (0)

## 2023-01-01 ASSESSMENT — ENCOUNTER SYMPTOMS: NEW SYMPTOMS OF CORONARY ARTERY DISEASE: 0

## 2023-01-11 ENCOUNTER — TELEPHONE (OUTPATIENT)
Dept: OBGYN | Facility: CLINIC | Age: 23
End: 2023-01-11

## 2023-01-11 NOTE — TELEPHONE ENCOUNTER
Left message for patient to call back and schedule surgery if patient still desires.    Nicole Rosales  Clinical Services Assistant

## 2023-02-22 NOTE — TELEPHONE ENCOUNTER
[facility] explaining why we had to [facility] and that scheduling would be in touch to reschedule.

## 2023-02-22 NOTE — PROGRESS NOTES
4/6 appointments canceled due to Dr. Trevino needing to cancel clinic. New orders submitted for schedulers to reschedule. Pt made aware.

## 2023-02-22 NOTE — TELEPHONE ENCOUNTER
[facility] Call Center    Phone Message    May a detailed message be left on voicemail: yes     Reason for Call: Other: Pt called in to find out why her appts in [place] got canceled. Please follow up.     Action Taken: Other: cardio    Travel Screening: Not Applicable     Thank you!  [facility]

## 2023-04-07 NOTE — TELEPHONE ENCOUNTER
Patient called stating that she has an angiogram on 4/13 and doesn't wish to do this test, as she flatlined 10 yrs ago. Message to coord.

## 2023-04-12 NOTE — TELEPHONE ENCOUNTER
Left voicemail to remind patient of Cardiac Cath Lab appointment on 4/13 and inform patient of updated Visitor Policy, need for  and that someone needs to stay with pt after.

## 2023-04-13 PROBLEM — Z13.220 LIPID SCREENING: Status: ACTIVE | Noted: 2023-01-01

## 2023-04-13 PROBLEM — Z13.29 THYROID DISORDER SCREENING: Status: ACTIVE | Noted: 2023-01-01

## 2023-04-13 PROBLEM — Z94.1 TRANSPLANTED HEART (H): Status: ACTIVE | Noted: 2023-01-01

## 2023-04-13 PROBLEM — E11.9 DIABETES MELLITUS (H): Status: ACTIVE | Noted: 2023-01-01

## 2023-04-13 PROBLEM — Z98.890 STATUS POST CORONARY ANGIOGRAM: Status: ACTIVE | Noted: 2023-01-01

## 2023-04-13 NOTE — PROGRESS NOTES
"Prep and teaching complete for [facility] AND [facility]; pt awake and alert, denies pain. Has ride post procedure, sister Denice at bedside, will bring her home, ph:  [phone]. Awaiting consent; pt reports anxiety re procedure as last angiogram was 10 years ago and she \"flat lined\".  Pt cooperative with all cares and teaching; pt reports she signs her own consents.  Dr Radames Boyer here for consent; aware Mag result.    "

## 2023-04-13 NOTE — DISCHARGE INSTRUCTIONS
Going Home after an Angiogram and Right heart catheterization  After you go home:  Have an adult stay with you for 24 hours.  Drink plenty of fluids.  You may eat your normal diet, unless your doctor tells you otherwise.  For 24 hours:  Relax and take it easy.  Do NOT smoke.  Do NOT make any important or legal decisions.  Do NOT drive or operate machines at home or at work.  Do NOT drink alcohol.  Remove the Band-Aids after 24 hours. If there is minor oozing, apply another Band-aid and remove it after 12 hours.  For 2 days, do NOT have sex or do any heavy exercise.  Do NOT take a bath, or use a hot tub or pool for at least 3 days. You may shower.    Care of wrist or arm site  It is normal to have soreness at the puncture site and mild tingling in your hand for up to 3 days.  For 2 days, do not use your hand or arm to support your weight (such as rising from a chair) or bend your wrist (such as lifting a garage door).  For 2 days, do not lift more than 5 pounds or exercise your arm (tennis, golf or bowling).    If you start bleeding from the site in your arm:  Sit down and press firmly on the site with your fingers for 10 minutes. Call your doctor as soon as you can.  If the bleeding stops, sit still and keep your wrist straight for 2 hours.    Care of the neck site:  Monitor neck site for bleeding, swelling, or voice changes. If you notice bleeding or swelling immediately apply pressure to the site and call number below to speak with the Cardiology Fellow.  If you experience any changes in your breathing you should call your doctor immediately or come to the closest Emergency Department.    Call 911 right away if you have bleeding that is heavy or does not stop.    Call your doctor if:  You have a large or growing hard lump around the sites.  The site is red, swollen, hot or tender.  Blood or fluid is draining from the site.  You have chills or a fever greater than 101 F (38 C).  Your hand feels numb or cool.  You  have hives, a rash or unusual itching.    [facility] [place]:  [phone] (7 days a week)

## 2023-04-13 NOTE — PROGRESS NOTES
D/I/A: Pt roomed on 3C in bay [address].  Arrived via litter and accompanied by RN off monitor.  VSSA.  Rhythm upon arrival SR on monitor.  Denies pain or sob.  Reviewed activity restrictions and when to notify RN, ie-changes to breathing or increased chest pressure or chest pain.  CCL access:  REGINE PEDRO.  P: Continue to monitor status.  Discharge to home once meeting criteria.

## 2023-04-14 NOTE — PROGRESS NOTES
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 Pt underwent heart cath in July 2011 for concern over aortic arch narrowing by echo, and heart cath was complicated by episode of complete heart block requiring cpr with spontaneous recovery of rhythm. Pt was found to have both ascending aortic narrowing at anastomotic site as well as mild descending aortic narrowing. Pt underwent stent placement in the coarctation on 8/14/2012, at which time patient was also found to have mild coronary vasculopathy. Pt was started on sirolimus (rapamune) on 8/28/12 because of the coronary findings, and once the level was therapeutic, the tacrolimus was discontinued. Pt was admitted from clinic on 11/5/12 with 2 week history of diarrhea and weight loss, was incidentally found to have coarctation stent that migrated and was in abdominal aorta, and also during hospitalization was diagnosed as having likely drug-induced colitis (either from rapamune or cellcept). Pt recovered well after stopping these medications and treating with tpn and antibiotics, and was discharged on 11/11/12. Over the subsequent years, from a cardiac/transplant standpoint, pt did well. 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 Pt was previously followed by Dr. Rao.    She reports that she has continued to overall feel well. She is not very active, but reports stable dyspnea with walking up more than one flight of stairs, which has been ongoing for many years. She denies any chest pain or pressure, sob at rest, orthopnea, pnd, palpitations, syncope/presyncope, LE edema, fever, chills, diarrhea, lymphadenopathy or change in exercise  capacity. She also denies any problems with her medications and reports compliance. She has been trying to eat better in the last year.    ROS:  A complete 12-point ROS was negative except as above.    Past Cardiac/Transplant History:  1. 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 Original diagnosis: double inlet left ventricle, d-TGA                     1. S/p PA band and tricuspid valve repair (6/2000)                     2. Developed subaortic obstruction and underwent DKS, atrial septectomy, AV valve repair and BT shunt placement, postoperative ECMO (2/2001)                     3. Shunt revision and PA plasty with postoperative ECMO (8/2001)           B. Had ongoing moderate to severe AV valve regurgitation and was felt to be poor single ventricle                            candidate so was referred to transplant  2. 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 S/p resection (11/12/2008)  3. Aortic arch narrowing            A. S/p stent placement (8/14/2012), stent found on 11/5/12 to have migrated to abdominal aorta  4. 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 cellcept induced colitis (diagnosed 11/5/12), now resolved off cellcept.  6. Iron deficiency anemia  7. EBV viremia (diagnosed December 2019) without evidence of PTLD    Her PRA are as follows:  5/19/21: no donor specific antibodies  7/2/2020: no donor specific antibodies  12/26/2019: no donor specific antibodies  8/12/2019: donor specific antibodies to DR8 ()  3/4/19: donor specific antibodies to DR8 ()  8/16/18: donor specific antibodies to DR8 (MFI 1187)  3/5/18: donor specific antibodies to DR 8 ()  8/24/17: no donor specific antibodies  3/6/17: donor specific antibodies to DR4 (MFI 3707)  8/3/16: donor specific antibodies to DR4 (MFI 2756)  8/10/15: donor specific antibodies to DR4 (MFI 2785), DPB1*04:01 (MFI 1478)  2/12/15: donor specific  antibodies to: DR4 xwe=9347, DPB1*04:01 hpu=348   8/18/14:  donor specific antibodies to DR4 with MFI 2325 (down from 2/13/14 MFI of 3184), DPB1*04:01 MFI 1537 and B44 [id number].       PAST MEDICAL HISTORY:  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       CURRENT MEDICATIONS:  Current Outpatient Medications   Medication Sig Dispense Refill     albuterol (PROAIR HFA) 108 (90 Base) MCG/ACT inhaler Inhale 2 puffs into the lungs every 4 hours as needed for shortness of breath / dyspnea 8 g 0     amoxicillin (AMOXIL) 500 MG capsule TAKE 4 CAPSULES BY MOUTH ONCE FOR 1 DOSE, 30-60 MINUTES PRIOR TO DENTAL CLEANING 4 capsule 0     aspirin EC 81 MG EC tablet Take 1 tablet by mouth daily. 30 tablet 6     biotin 1000 MCG TABS tablet Take 2,000 mcg by mouth daily       clindamycin (CLEOCIN T) 1 % external solution Apply 10 drops to scalp at nighttime before bed as needed for scalp folliculitis 60 mL 11     ketoconazole (NIZORAL) 2 % external shampoo Lather onto scalp in shower at least twice weekly and let sit for 2-3 minutes before rinsing 120 mL 11     lisinopril (ZESTRIL) 10 MG tablet Take 1 tablet (10 mg) by mouth daily 90 tablet 3     Magnesium 400 MG TABS Take 400 mg by mouth daily 30 tablet 3     Pediatric Multivitamins-Fl (MULTIVITAMIN WITH 1 MG FLUORIDE) 1 MG CHEW Take 1 tablet by mouth daily       rosuvastatin (CRESTOR) 10 MG tablet Take 1 tablet (10 mg) by mouth daily 90 tablet 3     sertraline (ZOLOFT) 100 MG tablet TAKE 1 TABLET(100 MG) BY MOUTH DAILY 30 tablet 2     tacrolimus (GENERIC EQUIVALENT) 0.5 MG capsule TAKE TWO CAPSULES BY MOUTH TWICE A  capsule 11     triamcinolone (KENALOG) 0.1 % external ointment Apply  "twice daily as needed for rash on trunk or extremities. Do not use for more than 2 weeks at a time. 80 g 11     vitamin D3 (CHOLECALCIFEROL) 50 mcg (2000 units) tablet Take 1 tablet (50 mcg) by mouth daily 90 tablet 3     Exam:  /81 (BP Location: Left arm, Patient Position: Chair, Cuff Size: Adult Regular)   Pulse 84   Ht 1.654 m (5' 5.12\")   Wt 58.7 kg (129 lb 4.8 oz)   SpO2 95%   BMI 21.44 kg/m    Gen: alert, interactive, no acute distress  HEENT: EOMI, atraumatic, normocephalic, wearing mask per protocol, neck supple, no lymphadenopathy  CV: RRR, soft diastolic rumble at RUSB, JVP ~6 cm  Resp: CTAB, no wheezes or crackles  GI: soft, NT, ND, +BS  Ext: warm and well perfused, no peripheral edema  Derm: no rashes on exposed surfaces  Neuro: alert and oriented, no focal deficits  Psych: normal mood and affect    Wt Readings from Last 4 Encounters:   04/14/23 58.7 kg (129 lb 4.8 oz)   04/13/23 58.1 kg (128 lb)   11/25/22 60.3 kg (133 lb)   08/24/22 63.1 kg (139 lb 1.6 oz)     I personally reviewed labs and data as below and discussed the results with the patient in clinic today.  Labs:  CBC RESULTS:   Lab Results   Component Value Date    WBC 6.3 04/13/2023    WBC 6.6 06/24/2021    RBC 5.21 04/13/2023    RBC 4.97 06/24/2021    HGB 10.1 (L) 04/13/2023    HGB 11.0 (L) 04/13/2023    HGB 13.4 06/24/2021    HCT 37.8 04/13/2023    HCT 41.5 06/24/2021    MCV 73 (L) 04/13/2023    MCV 84 06/24/2021    MCH 21.1 (L) 04/13/2023    MCH 27.0 06/24/2021    MCHC 29.1 (L) 04/13/2023    MCHC 32.3 06/24/2021    RDW 17.2 (H) 04/13/2023    RDW 12.5 06/24/2021     04/13/2023     06/24/2021       BMP RESULTS:  Lab Results   Component Value Date     04/13/2023     06/24/2021    POTASSIUM 4.0 04/13/2023    POTASSIUM 3.8 04/21/2022    POTASSIUM 4.1 06/24/2021    CHLORIDE 102 04/13/2023    CHLORIDE 104 04/21/2022    CHLORIDE 107 06/24/2021    CO2 19 (L) 04/13/2023    CO2 27 04/21/2022    CO2 21 06/24/2021    " ANIONGAP 17 (H) 04/13/2023    ANIONGAP 6 04/21/2022    ANIONGAP 9 06/24/2021    GLC 99 04/13/2023    GLC 96 04/21/2022     (H) 06/24/2021    BUN 18.4 04/13/2023    BUN 12 04/21/2022    BUN 14 06/24/2021    CR 0.90 04/13/2023    CR 0.91 06/24/2021    GFRESTIMATED >90 04/13/2023    GFRESTIMATED 90 06/24/2021    GFRESTBLACK >90 06/24/2021    CHICA 9.4 04/13/2023    CHICA 8.9 06/24/2021      LIPID RESULTS:  Lab Results   Component Value Date    CHOL 112 04/13/2023    CHOL 120 07/02/2020    HDL 45 04/13/2023    HDL 53 07/02/2020    LDL 49 04/13/2023    LDL 56 07/02/2020    TRIG 88 04/13/2023    TRIG 54 07/02/2020    CHOLHDLRATIO 2.4 08/18/2014    NHDL 67 04/13/2023    NHDL 67 07/02/2020       IMMUNOSUPPRESSANT LEVELS  Lab Results   Component Value Date    TACROL 5.1 04/13/2023    TACROL 7.8 06/24/2021    DOSTAC  04/13/2023      Comment:      Last dose information not provided.    DOSTAC 06/23 2115 06/24/2021    RAPAMY 8.8 11/08/2012       No components found for: CK  Lab Results   Component Value Date    MAG 1.6 (L) 04/13/2023    MAG 1.4 (L) 06/24/2021     Lab Results   Component Value Date    A1C 6.1 (H) 04/13/2023     Lab Results   Component Value Date    PHOS 4.3 04/13/2023    PHOS 3.1 06/24/2021       Diagnostic Studies:    CT July 2020   1. Postoperative changes of cardiac transplantation with stable caliber change at the aortic anastomosis.  2. Stable migrated aortic stent at the level of the celiac artery without associated thrombus.  3. Stable emphysematous changes in the left lower lobe.  Proximal arch: 2.3 x 2.2 cm  Proximal arch at anastomotic site: 1.6 x 1.3 cm, grossly stable  Mid arch: 1.5 x 1.7 cm   Isthmus: 1.7 x 1.5 cm, previously 1.3 x 1.3 cm  Proximal descending aorta: 2.7 x 2.8 cm  Distal descending aorta: 1.9 x 1.9 cm    Echo 6/24/21  Patient after orthotopic heart transplant. (2/17/04) Normal right and left  ventricular systolic function. The calculated biplane left ventricular  ejection fraction  is 65 %. LVRI 0.8. Trivial mitral valve insufficiency. Mild  aortic valve insufficiency. Stent in the abdominal aorta next to the celiac  artery. There is blunted systolic upstroke Doppler flow pattern with diastolic  run-off in the descending abdominal aorta. In the proximal descending thoracic  aorta there is a peak gradient of 45 mm Hg with a mean gradient of 11 mmHg. No  pericardial effusion.    CMR Report 4-  Clinical history: 21 year-old s/p OHT. Surveillance CMR.   1. The LV is normal in cavity size and wall thickness. The global systolic function is normal. The LVEF is  64%. There are no regional wall motion abnormalities.  2. The RV is normal in cavity size. The global systolic function is normal. The RVEF is 63%.   3. The left atrium is mildly dilated due to heart transplant.   4. Limited assessment of valve disease due to significant metal artifact.   5. Late gadolinium enhancement imaging shows no MI, fibrosis or infiltrative disease.   6. Regadenoson stress perfusion imaging shows no ischemia.  7. There is no pericardial effusion or thickening.  CONCLUSIONS: Normal LV and RV function, LVEF of 64% and RVEF of 63%. There is no evidence of ischemia on  stress perfusion imaging.     ECG 4/13/23 shows sinus rhythm, nonspecific intraventricular conduction block, right axis deviation, no acute ST-T changes    Coronary Angiogram/RHC 4/13/23  Coronary Findings    Diagnostic  Dominance: Co-dominant  Left Main   The vessel is large and is angiographically normal.      Left Anterior Descending   The vessel was visualized by selective angiography, is small and is angiographically normal. There was 25% diffuse vessel disease. Unchanged or better than seen on the angiogram 10 years ago.   Mid LAD to Dist LAD lesion is 20% stenosed with 0% stenosed side branch in 2nd Diag.      First Diagonal Branch   The vessel is large and is angiographically normal.      Lateral First Diagonal Branch   The vessel is large and  is angiographically normal.      First Septal Branch   The vessel is moderate in size and is angiographically normal.      Second Diagonal Branch   The vessel is small and is angiographically normal.      Second Septal Branch   The vessel is small and is angiographically normal.      Left Circumflex   The vessel is large and is angiographically normal.      First Obtuse Marginal Branch   The vessel is small and is angiographically normal.      Second Obtuse Marginal Branch   The vessel is small and is angiographically normal.      First Left Posterolateral Branch   The vessel is moderate in size and is angiographically normal. There is a small lateral branch      Right Coronary Artery   The vessel is large and is angiographically normal.      Acute Marginal Branch   The vessel is small and is angiographically normal.      Right Ventricular Branch   The vessel is small and is angiographically normal.         Intervention     No interventions have been documented.     Hemodynamics    Right Heart Catheterization:  /73/96  HR 81 BPM  BSA 1.64     RA 5  RV 29/7  PA 31/13/23   PCW 10   TD CO 4.27   TD CI 2.61  PA sat 54.6%   Hgb 10.1 g/dL   PVR  3.63       TTE 4/14/23  Interpretation Summary  Global and regional left ventricular function is normal with an EF of 55-60%.  Global right ventricular function is normal.  Mild aortic insufficiency is present. Mild mitral insufficiency is present.  Estimated mean right atrial pressure is normal.  Stent in the abdominal aorta (by history migrated from the proximal aorta).  There is blunted systolic upstroke with diastolic run-off in the descending  abdominal aorta. In the proximal descending thoracic aorta there is a peak  gradient of 33 mm Hg, coarctation of the aorta is present.  No pericardial effusion is present.    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 Pt underwent heart cath in July 2011 for concern over aortic arch narrowing by echo, and heart cath was complicated by episode of complete heart block requiring cpr with spontaneous recovery of rhythm. Pt was found to have both ascending aortic narrowing at anastomotic site as well as mild descending aortic narrowing. Pt underwent stent placement in the coarctation on 8/14/2012, at which time patient was also found to have mild coronary vasculopathy. Pt was started on sirolimus (rapamune) on 8/28/12 because of the coronary findings, and once the level was therapeutic, the tacrolimus was discontinued. Pt was admitted from clinic on 11/5/12 with 2 week history of diarrhea and weight loss, was incidentally found to have coarctation stent that migrated and was in abdominal aorta, and also during hospitalization was diagnosed as having likely drug-induced colitis (either from rapamune or cellcept). Pt recovered well after stopping these medications and treating with tpn and antibiotics, and was discharged on 11/11/12. Over the subsequent years, from a cardiac/transplant standpoint, pt did well. 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 Pt was previously followed by Dr. Rao and here for annual testing.    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 Original diagnosis: double inlet left ventricle, d-TGA                     1. S/p PA band and tricuspid valve repair (6/2000)                     2. Developed subaortic obstruction and underwent DKS, atrial septectomy, AV valve repair and BT shunt                        placement, postoperative ECMO (2/2001)                     3. Shunt revision and PA plasty with postoperative ECMO (8/2001)           B. 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 S/p resection (11/12/2008)  # Aortic arch narrowing            A. 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  # Last biopsy was 7/11/11 and showed negative C3d/C4d staining, no evidence of rejection grade 0.    Pt is now 19 years out from orthotopic heart transplant and she continues to feel well.  Recent coronary angiogram shows mild CAV 20% of m-dLAD with normal hemodynamics. TTE with preserved graft function and chronic AI and MR. She is euvolemic on exam today.    Immunosuppressants:  No recent DSAs  On tacrolimus monotherapy given length from transplant and EBV viremia. Trough goal 4-6  Recent level 5.1. Continue tacrolimus 1 mg bid  DSAs pending from 4/13/23    Graft function:  TTE as above.  BP today well controlled. Continue to monitor and call clinic if BP consistently greater than 135/85.  Encouraged patient to continue regular aerobic exercise aiming for at least 150 minutes of moderate physical activity or 75 minutes of vigorous physical activity - or an equal combination of both - each week. and follow low-salt, heart healthy diet.    CAV (20% mid-distal LAD)  Continue rosuvastatin 10 mg daily. Diet and exercise as above.    Routine screenings/health maintainence:    Derm: UTD  Dental: DUE  Colonoscopy: 2012~dx with medication induced colitis. Start at age 40  Breast: Start at age 40  Eye: UTD  Flu/Pneumonia: Did not get flu this year, would get it this Fall  Covid: DUE for booster  Patient has been counseled against pregnancy given higher risk with heart transplant and while on immunosuppression and  statin    # EBV viremia (diagnosed December 2019) without evidence of PTLD. Now worsening  Remains asymptomatic.    - Referral to adult Transplant ID    # Prediabetes  - Refer to dietician for assistance with carb management  - Will continue to monitor    To Do:    - Follow-up outstanding lab results from 4/13  - Decrease tac level to 4-6 from 5-8 in setting of higher EBV levels recently  - Referral to Transplant ID for chronic and worsening EBV viremia  - Referral to dietician for nutrition teaching for prediabetes  - SOT labs in 6 months  - Follow up with me in one year with cMRI stress and labs. Will be happy to see sooner if change in clinical status or new questions/concerns arise.    I spent a total of 40 minutes today in chart review as well as personally reviewing recent cardiac testing and/or laboratory results, today's history and examination, and discussion and counseling with the patient.    Cyndy Trevino MD   of Medicine, [facility]   Advanced Heart Failure and Transplant Cardiology

## 2023-04-14 NOTE — PATIENT INSTRUCTIONS
Please call your transplant coordinator at [phone] with any questions or concerns.  Please note: after hours, weekends and holidays, this phone number is routed to an  to page out the coordinator on call.     Coordinator will update you on remaining results.     Next lab draw: in 6 months unless infectious disease requests EBV  recheck     Tac level at goal - no dose change     Will place referral to infectious disease and dietician     COVID vaccine after your cold resolves    Flu vaccine this fall

## 2023-04-14 NOTE — NURSING NOTE
Chief Complaint   Patient presents with     Follow Up     Return Heart Transplant- post heart transplant     Vitals were taken and medications reconciled.    Reese Ortiz, JEFF  3:56 PM

## 2023-04-14 NOTE — LETTER
4/14/2023      RE: Laurel Santoyo  [address] [facility] [id number]       Dear Colleague,    Thank you for the opportunity to participate in the care of your patient, Laurel Santoyo, at the [facility] [place] [facility]. Please see a copy of my visit note below.    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 Pt underwent heart cath in July 2011 for concern over aortic arch narrowing by echo, and heart cath was complicated by episode of complete heart block requiring cpr with spontaneous recovery of rhythm. Pt was found to have both ascending aortic narrowing at anastomotic site as well as mild descending aortic narrowing. Pt underwent stent placement in the coarctation on 8/14/2012, at which time patient was also found to have mild coronary vasculopathy. Pt was started on sirolimus (rapamune) on 8/28/12 because of the coronary findings, and once the level was therapeutic, the tacrolimus was discontinued. Pt was admitted from clinic on 11/5/12 with 2 week history of diarrhea and weight loss, was incidentally found to have coarctation stent that migrated and was in abdominal aorta, and also during hospitalization was diagnosed as having likely drug-induced colitis (either from rapamune or cellcept). Pt recovered well after stopping these medications and treating with tpn and antibiotics, and was discharged on 11/11/12. Over the subsequent years, from a cardiac/transplant standpoint, pt did well. 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 Pt was previously followed by Dr. Rao.    She reports that she  has continued to overall feel well. She is not very active, but reports stable dyspnea with walking up more than one flight of stairs, which has been ongoing for many years. She denies any chest pain or pressure, sob at rest, orthopnea, pnd, palpitations, syncope/presyncope, LE edema, fever, chills, diarrhea, lymphadenopathy or change in exercise capacity. She also denies any problems with her medications and reports compliance. She has been trying to eat better in the last year.    ROS:  A complete 12-point ROS was negative except as above.    Past Cardiac/Transplant History:  1. 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 Original diagnosis: double inlet left ventricle, d-TGA                     1. S/p PA band and tricuspid valve repair (6/2000)                     2. Developed subaortic obstruction and underwent DKS, atrial septectomy, AV valve repair and BT shunt placement, postoperative ECMO (2/2001)                     3. Shunt revision and PA plasty with postoperative ECMO (8/2001)           B. Had ongoing moderate to severe AV valve regurgitation and was felt to be poor single ventricle                            candidate so was referred to transplant  2. 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 S/p resection (11/12/2008)  3. Aortic arch narrowing            A. S/p stent placement (8/14/2012), stent found on 11/5/12 to have migrated to abdominal aorta  4. 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 cellcept induced colitis (diagnosed 11/5/12), now resolved off cellcept.  6. Iron deficiency anemia  7. EBV viremia (diagnosed December 2019) without evidence of PTLD    Her PRA are as follows:  5/19/21: no donor specific antibodies  7/2/2020: no donor specific antibodies  12/26/2019: no donor specific antibodies  8/12/2019: donor specific antibodies to DR8 ()  3/4/19: donor specific antibodies to DR8 ()  8/16/18:  donor specific antibodies to DR8 (MFI 1187)  3/5/18: donor specific antibodies to DR 8 ()  8/24/17: no donor specific antibodies  3/6/17: donor specific antibodies to DR4 (MFI 3707)  8/3/16: donor specific antibodies to DR4 (MFI 2756)  8/10/15: donor specific antibodies to DR4 (MFI 2785), DPB1*04:01 (MFI 1478)  2/12/15: donor specific antibodies to: DR4 svc=2809, DPB1*04:01 enb=024   8/18/14:  donor specific antibodies to DR4 with MFI 2325 (down from 2/13/14 MFI of 3184), DPB1*04:01 MFI 1537 and B44 [id number].       PAST MEDICAL HISTORY:  Past Medical History:   Diagnosis Date    Heart transplant, orthotopic, status  2/2004 08/14/2012    HLHS (hypoplastic left heart syndrome) 08/22/2007    Hx of Heart Transplant in 2/2004 at age 3 years Nov, 2008 sub-aortic membrane removal     PONV (postoperative nausea and vomiting)     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       CURRENT MEDICATIONS:  Current Outpatient Medications   Medication Sig Dispense Refill    albuterol (PROAIR HFA) 108 (90 Base) MCG/ACT inhaler Inhale 2 puffs into the lungs every 4 hours as needed for shortness of breath / dyspnea 8 g 0    amoxicillin (AMOXIL) 500 MG capsule TAKE 4 CAPSULES BY MOUTH ONCE FOR 1 DOSE, 30-60 MINUTES PRIOR TO DENTAL CLEANING 4 capsule 0    aspirin EC 81 MG EC tablet Take 1 tablet by mouth daily. 30 tablet 6    biotin 1000 MCG TABS tablet Take 2,000 mcg by mouth daily      clindamycin (CLEOCIN T) 1 % external solution Apply 10 drops to scalp at nighttime before bed as needed for scalp folliculitis 60 mL 11    ketoconazole (NIZORAL) 2 % external shampoo Lather onto scalp in shower at least twice weekly and let sit for 2-3 minutes before rinsing 120 mL 11    lisinopril (ZESTRIL) 10 MG tablet Take 1 tablet (10 mg) by mouth daily 90 tablet 3    Magnesium 400 MG TABS Take 400 mg by mouth daily 30 tablet 3    Pediatric Multivitamins-Fl (MULTIVITAMIN WITH 1 MG FLUORIDE) 1 MG CHEW Take 1 tablet by mouth daily       "rosuvastatin (CRESTOR) 10 MG tablet Take 1 tablet (10 mg) by mouth daily 90 tablet 3    sertraline (ZOLOFT) 100 MG tablet TAKE 1 TABLET(100 MG) BY MOUTH DAILY 30 tablet 2    tacrolimus (GENERIC EQUIVALENT) 0.5 MG capsule TAKE TWO CAPSULES BY MOUTH TWICE A  capsule 11    triamcinolone (KENALOG) 0.1 % external ointment Apply twice daily as needed for rash on trunk or extremities. Do not use for more than 2 weeks at a time. 80 g 11    vitamin D3 (CHOLECALCIFEROL) 50 mcg (2000 units) tablet Take 1 tablet (50 mcg) by mouth daily 90 tablet 3     Exam:  /81 (BP Location: Left arm, Patient Position: Chair, Cuff Size: Adult Regular)   Pulse 84   Ht 1.654 m (5' 5.12\")   Wt 58.7 kg (129 lb 4.8 oz)   SpO2 95%   BMI 21.44 kg/m    Gen: alert, interactive, no acute distress  HEENT: EOMI, atraumatic, normocephalic, wearing mask per protocol, neck supple, no lymphadenopathy  CV: RRR, soft diastolic rumble at RUSB, JVP ~6 cm  Resp: CTAB, no wheezes or crackles  GI: soft, NT, ND, +BS  Ext: warm and well perfused, no peripheral edema  Derm: no rashes on exposed surfaces  Neuro: alert and oriented, no focal deficits  Psych: normal mood and affect    Wt Readings from Last 4 Encounters:   04/14/23 58.7 kg (129 lb 4.8 oz)   04/13/23 58.1 kg (128 lb)   11/25/22 60.3 kg (133 lb)   08/24/22 63.1 kg (139 lb 1.6 oz)     I personally reviewed labs and data as below and discussed the results with the patient in clinic today.  Labs:  CBC RESULTS:   Lab Results   Component Value Date    WBC 6.3 04/13/2023    WBC 6.6 06/24/2021    RBC 5.21 04/13/2023    RBC 4.97 06/24/2021    HGB 10.1 (L) 04/13/2023    HGB 11.0 (L) 04/13/2023    HGB 13.4 06/24/2021    HCT 37.8 04/13/2023    HCT 41.5 06/24/2021    MCV 73 (L) 04/13/2023    MCV 84 06/24/2021    MCH 21.1 (L) 04/13/2023    MCH 27.0 06/24/2021    MCHC 29.1 (L) 04/13/2023    MCHC 32.3 06/24/2021    RDW 17.2 (H) 04/13/2023    RDW 12.5 06/24/2021     04/13/2023     06/24/2021 "       BMP RESULTS:  Lab Results   Component Value Date     04/13/2023     06/24/2021    POTASSIUM 4.0 04/13/2023    POTASSIUM 3.8 04/21/2022    POTASSIUM 4.1 06/24/2021    CHLORIDE 102 04/13/2023    CHLORIDE 104 04/21/2022    CHLORIDE 107 06/24/2021    CO2 19 (L) 04/13/2023    CO2 27 04/21/2022    CO2 21 06/24/2021    ANIONGAP 17 (H) 04/13/2023    ANIONGAP 6 04/21/2022    ANIONGAP 9 06/24/2021    GLC 99 04/13/2023    GLC 96 04/21/2022     (H) 06/24/2021    BUN 18.4 04/13/2023    BUN 12 04/21/2022    BUN 14 06/24/2021    CR 0.90 04/13/2023    CR 0.91 06/24/2021    GFRESTIMATED >90 04/13/2023    GFRESTIMATED 90 06/24/2021    GFRESTBLACK >90 06/24/2021    CHICA 9.4 04/13/2023    CHICA 8.9 06/24/2021      LIPID RESULTS:  Lab Results   Component Value Date    CHOL 112 04/13/2023    CHOL 120 07/02/2020    HDL 45 04/13/2023    HDL 53 07/02/2020    LDL 49 04/13/2023    LDL 56 07/02/2020    TRIG 88 04/13/2023    TRIG 54 07/02/2020    CHOLHDLRATIO 2.4 08/18/2014    NHDL 67 04/13/2023    NHDL 67 07/02/2020       IMMUNOSUPPRESSANT LEVELS  Lab Results   Component Value Date    TACROL 5.1 04/13/2023    TACROL 7.8 06/24/2021    DOSTAC  04/13/2023      Comment:      Last dose information not provided.    DOSTAC 06/23 2115 06/24/2021    RAPAMY 8.8 11/08/2012       No components found for: CK  Lab Results   Component Value Date    MAG 1.6 (L) 04/13/2023    MAG 1.4 (L) 06/24/2021     Lab Results   Component Value Date    A1C 6.1 (H) 04/13/2023     Lab Results   Component Value Date    PHOS 4.3 04/13/2023    PHOS 3.1 06/24/2021       Diagnostic Studies:    CT July 2020   1. Postoperative changes of cardiac transplantation with stable caliber change at the aortic anastomosis.  2. Stable migrated aortic stent at the level of the celiac artery without associated thrombus.  3. Stable emphysematous changes in the left lower lobe.  Proximal arch: 2.3 x 2.2 cm  Proximal arch at anastomotic site: 1.6 x 1.3 cm, grossly  stable  Mid arch: 1.5 x 1.7 cm   Isthmus: 1.7 x 1.5 cm, previously 1.3 x 1.3 cm  Proximal descending aorta: 2.7 x 2.8 cm  Distal descending aorta: 1.9 x 1.9 cm    Echo 6/24/21  Patient after orthotopic heart transplant. (2/17/04) Normal right and left  ventricular systolic function. The calculated biplane left ventricular  ejection fraction is 65 %. LVRI 0.8. Trivial mitral valve insufficiency. Mild  aortic valve insufficiency. Stent in the abdominal aorta next to the celiac  artery. There is blunted systolic upstroke Doppler flow pattern with diastolic  run-off in the descending abdominal aorta. In the proximal descending thoracic  aorta there is a peak gradient of 45 mm Hg with a mean gradient of 11 mmHg. No  pericardial effusion.    CMR Report 4-  Clinical history: 21 year-old s/p OHT. Surveillance CMR.   1. The LV is normal in cavity size and wall thickness. The global systolic function is normal. The LVEF is  64%. There are no regional wall motion abnormalities.  2. The RV is normal in cavity size. The global systolic function is normal. The RVEF is 63%.   3. The left atrium is mildly dilated due to heart transplant.   4. Limited assessment of valve disease due to significant metal artifact.   5. Late gadolinium enhancement imaging shows no MI, fibrosis or infiltrative disease.   6. Regadenoson stress perfusion imaging shows no ischemia.  7. There is no pericardial effusion or thickening.  CONCLUSIONS: Normal LV and RV function, LVEF of 64% and RVEF of 63%. There is no evidence of ischemia on  stress perfusion imaging.     ECG 4/13/23 shows sinus rhythm, nonspecific intraventricular conduction block, right axis deviation, no acute ST-T changes    Coronary Angiogram/RHC 4/13/23  Coronary Findings    Diagnostic  Dominance: Co-dominant  Left Main   The vessel is large and is angiographically normal.      Left Anterior Descending   The vessel was visualized by selective angiography, is small and is  angiographically normal. There was 25% diffuse vessel disease. Unchanged or better than seen on the angiogram 10 years ago.   Mid LAD to Dist LAD lesion is 20% stenosed with 0% stenosed side branch in 2nd Diag.      First Diagonal Branch   The vessel is large and is angiographically normal.      Lateral First Diagonal Branch   The vessel is large and is angiographically normal.      First Septal Branch   The vessel is moderate in size and is angiographically normal.      Second Diagonal Branch   The vessel is small and is angiographically normal.      Second Septal Branch   The vessel is small and is angiographically normal.      Left Circumflex   The vessel is large and is angiographically normal.      First Obtuse Marginal Branch   The vessel is small and is angiographically normal.      Second Obtuse Marginal Branch   The vessel is small and is angiographically normal.      First Left Posterolateral Branch   The vessel is moderate in size and is angiographically normal. There is a small lateral branch      Right Coronary Artery   The vessel is large and is angiographically normal.      Acute Marginal Branch   The vessel is small and is angiographically normal.      Right Ventricular Branch   The vessel is small and is angiographically normal.         Intervention     No interventions have been documented.     Hemodynamics    Right Heart Catheterization:  /73/96  HR 81 BPM  BSA 1.64     RA 5  RV 29/7  PA 31/13/23   PCW 10   TD CO 4.27   TD CI 2.61  PA sat 54.6%   Hgb 10.1 g/dL   PVR  3.63       TTE 4/14/23  Interpretation Summary  Global and regional left ventricular function is normal with an EF of 55-60%.  Global right ventricular function is normal.  Mild aortic insufficiency is present. Mild mitral insufficiency is present.  Estimated mean right atrial pressure is normal.  Stent in the abdominal aorta (by history migrated from the proximal aorta).  There is blunted systolic upstroke with diastolic  run-off in the descending  abdominal aorta. In the proximal descending thoracic aorta there is a peak  gradient of 33 mm Hg, coarctation of the aorta is present.  No pericardial effusion is present.    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 Pt underwent heart cath in July 2011 for concern over aortic arch narrowing by echo, and heart cath was complicated by episode of complete heart block requiring cpr with spontaneous recovery of rhythm. Pt was found to have both ascending aortic narrowing at anastomotic site as well as mild descending aortic narrowing. Pt underwent stent placement in the coarctation on 8/14/2012, at which time patient was also found to have mild coronary vasculopathy. Pt was started on sirolimus (rapamune) on 8/28/12 because of the coronary findings, and once the level was therapeutic, the tacrolimus was discontinued. Pt was admitted from clinic on 11/5/12 with 2 week history of diarrhea and weight loss, was incidentally found to have coarctation stent that migrated and was in abdominal aorta, and also during hospitalization was diagnosed as having likely drug-induced colitis (either from rapamune or cellcept). Pt recovered well after stopping these medications and treating with tpn and antibiotics, and was discharged on 11/11/12. Over the subsequent years, from a cardiac/transplant standpoint, pt did well. 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 Pt was previously followed by Dr. Rao and here for annual testing.    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 Original diagnosis: double inlet left ventricle, d-TGA                     1. S/p  PA band and tricuspid valve repair (6/2000)                     2. Developed subaortic obstruction and underwent DKS, atrial septectomy, AV valve repair and BT shunt                        placement, postoperative ECMO (2/2001)                     3. Shunt revision and PA plasty with postoperative ECMO (8/2001)           B. 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 S/p resection (11/12/2008)  # Aortic arch narrowing            A. 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  # Last biopsy was 7/11/11 and showed negative C3d/C4d staining, no evidence of rejection grade 0.    Pt is now 19 years out from orthotopic heart transplant and she continues to feel well.  Recent coronary angiogram shows mild CAV 20% of m-dLAD with normal hemodynamics. TTE with preserved graft function and chronic AI and MR. She is euvolemic on exam today.    Immunosuppressants:  No recent DSAs  On tacrolimus monotherapy given length from transplant and EBV viremia. Trough goal 4-6  Recent level 5.1. Continue tacrolimus 1 mg bid  DSAs pending from 4/13/23    Graft function:  TTE as above.  BP today well controlled. Continue to monitor and call clinic if BP consistently greater than 135/85.  Encouraged patient to continue regular aerobic exercise aiming for at least 150 minutes of moderate physical activity or 75 minutes of vigorous physical activity - or an equal combination of both - each week. and follow low-salt, heart healthy diet.    CAV (20% mid-distal LAD)  Continue rosuvastatin 10 mg daily. Diet and exercise as above.    Routine screenings/health maintainence:     Derm: UTD  Dental: DUE  Colonoscopy: 2012~dx with medication induced colitis. Start at age 40  Breast: Start at age 40  Eye: UTD  Flu/Pneumonia: Did not get flu this year, would get it this Fall  Covid: DUE for booster  Patient has been counseled against pregnancy given higher risk with heart transplant and while on immunosuppression and statin    # EBV viremia (diagnosed December 2019) without evidence of PTLD. Now worsening  Remains asymptomatic.    - Referral to adult Transplant ID    # Prediabetes  - Refer to dietician for assistance with carb management  - Will continue to monitor    To Do:    - Follow-up outstanding lab results from 4/13  - Decrease tac level to 4-6 from 5-8 in setting of higher EBV levels recently  - Referral to Transplant ID for chronic and worsening EBV viremia  - Referral to dietician for nutrition teaching for prediabetes  - SOT labs in 6 months  - Follow up with me in one year with cMRI stress and labs. Will be happy to see sooner if change in clinical status or new questions/concerns arise.    I spent a total of 40 minutes today in chart review as well as personally reviewing recent cardiac testing and/or laboratory results, today's history and examination, and discussion and counseling with the patient.    Cydny Trevino MD   of Medicine, [facility]   Advanced Heart Failure and Transplant Cardiology

## 2023-04-16 NOTE — NURSING NOTE
"Transplant Coordinator Note     Reason for visit: 19th annual. Angio/rhc and labs completed 4/13. Echo, CXR, and clinic 4/14.     Health concerns addressed today:   Pt seen in clinic with Dr Trevino. MD review available labs, meds and testing. Pt's has had \"cold\" like symptoms last week - COVID neg, improving.     Noted increase in EBV - MD enc pt to be seen by adult ID. Asymptomatic.    A1C  6.1 - pt reports forgetting to eat. High carb diet - agreed to talk with dietician. Wght trend down ~10#.     Chest pain with anxiety - not new. Lays with multiple pillow - not new.     Stress MRI for 2024 annual     Immunosuppressants  Tac monotherapy goal 5-8. Yesterdays level 5.1 -> goal dropped to 4-6 per MD no dose change    MMF dc'd due to colitis in 2012     Tried rapa for early CAV but discontinued during hospitalization in 2012 for colitis, at the time they stopped both MMF and rapa unsure which was causing it.     Routine screenings   Derm: Last 12/2022. Due late fall.   Dental: UTD -> MD requested to to have antibiotic for visited   Colonoscopy: 2012~ dx with MMF colitis. Will start annual colo at age 40   Breast: Start at age 40   Eye: MILAN  Flu - MD enc to obtain early fall 2023   Pneumonia: up to date   Covid: Last 7/2022 -> enc to obtain booster after \"cold\" resolved\"     Medication record reviewed and reconciled. AVE reviewed and provided. Questions and concerns addressed   Pt verbalized an understanding of plan of care.     Patient Instructions   ~Please call your transplant coordinator at [phone] with any questions or concerns.  Please note: after hours, weekends and holidays, this phone number is routed to an  to page out the coordinator on call.   ~Coordinator will update you on remaining results.   ~Next lab draw: in 6 months unless infectious disease requests EBV  recheck   ~Tac level at goal - no dose change   ~Will place referral to infectious disease and dietician   ~COVID vaccine after your " cold resolves  ~Flu vaccine this fall

## 2023-04-21 NOTE — TELEPHONE ENCOUNTER
DIAGNOSIS: BV infection [B27.90]  Transplanted heart    DATE RECEIVED: 04.27.2023   NOTES (Gather within 2 years) STATUS DETAILS   OFFICE NOTE from referring provider   Internal 04.16.2023 Cyndy Trevino MD   OFFICE NOTE from other specialist     DISCHARGE SUMMARY from hospital     DISCHARGE REPORT from the ER     LABS (any labs) Internal    MEDICATION LIST Internal    IMAGING  (NEED IMAGES AND REPORTS)     Osteomyelitis: Foot imaging      Liver Abscess: Abdominal imaging     Other (anything related to diagnoses

## 2023-06-06 NOTE — PROGRESS NOTES
[facility] Dermatology Note  Encounter Date: Jun 6, 2023  Office Visit     Dermatology Problem List:  1. Hx of heart transplant 2/17/2004 for congenital heart disease  2. Atopic dermatitis.   - triam 0.1% ointment BID    ____________________________________________    Assessment & Plan:     # History of  Heart solid organ transplantation in the setting of congenital heart disease.    - Due to the immunosuppressive medications used following transplant, solid organ transplant recipients are at a roughly 65-fold increased risk of squamous cell carcinoma, 20-fold increased risk of basal cell carcinoma, and 3.4 fold increased risk of melanoma compared to the general population.   - Prevention methods for reducing skin cancer after transplantation include avoidance of sun exposure, avoidance of indoor tanning beds or other indoor tanning devices, use of protective clothing (e.g. wide-brimmed hats, long sleeves, long pants), SPF 30 or greater sunscreen, and UV-protective sunglasses when outdoors.     # Atopic dermatitis. Chronic, stable.  - Continue PRN use of triamcinolone 0.1% ointment BID. Refills provided.     # Benign lesions: Multiple benign nevi, solar lentigos, nevus spilus. Explained to patient benign nature of lesion. No treatment is necessary at this time unless the lesion changes or becomes symptomatic.   - ABCDs of melanoma were discussed and self skin checks were advised.  - Sun precaution was advised including the use of sun screens of SPF 30 or higher, sun protective clothing, and avoidance of tanning beds.    Procedures Performed:   None    Follow-up: 1 year(s) in-person, or earlier for new or changing lesions    Staff:     Rolando Ramirez MD  Pronouns: he/him/his    Department of Dermatology  [facility]: Phone: [phone], Fax:[phone]  [facility]: Phone:  [phone] Fax: [phone]  ____________________________________________    CC: Skin Check (Areas of concern include upper arms due to eczema)    HPI:   Laurel Santoyo is a(n) 22 year old adult who presents today as a return patient for FBSE. Last seen on 12/6/22 for FBSE when no concerning lesions were noted. She was started on clindamycin 1% solution and ketoconazole 2% shampoo BIW as well as NAC for seb derm/folliculitis +/- acne excoriee on the scalp.     Today, reports:    1. Eczema on the upper and lower extremities. Not entirely consistent with topicals, but usually responds well to triam 0.1% ointment.     2. Scalp folliculitis/seb derm resolved. Not using topical therapies.     The patient otherwise denies any new or concerning lesions. No bleeding, painful, pruritic, or changing lesions. They report no personal history of skin cancer. There is a family history of skin cancer in paternal grandparents. There is a history of immunosuppression; heart transplant. No history of indoor tanning. They do use sunscreen and protective clothing when outdoors for sun protection. No occupational exposure to ultraviolet light or other forms of radiation. Health otherwise stable. No other skin concerns.      Labs Reviewed:  N/A    Physical Exam:  Vitals: There were no vitals taken for this visit.  SKIN: Full skin, which includes the head/face, both arms, chest, back, abdomen,both legs, genitalia and/or groin buttocks, digits and/or nails, was examined.  - Brown macules on sun exposed areas  - Brown macules and papules on trunk and extremities without concerning dermoscopy features  - Light brown patch with numerous dark macules centrally on the central paraspinal back.  - Few eczematous appearing scaly thin plaques on bilateral forearms, elbows, lower extremities.   - No other lesions of concern on areas examined.     Medications:  Current Outpatient Medications   Medication     albuterol (PROAIR HFA) 108 (90 Base)  MCG/ACT inhaler     aspirin EC 81 MG EC tablet     biotin 1000 MCG TABS tablet     clindamycin (CLEOCIN T) 1 % external solution     ketoconazole (NIZORAL) 2 % external shampoo     lisinopril (ZESTRIL) 10 MG tablet     Magnesium 400 MG TABS     multivitamin w/minerals (MULTI-VITAMIN) tablet     rosuvastatin (CRESTOR) 10 MG tablet     sertraline (ZOLOFT) 100 MG tablet     tacrolimus (GENERIC EQUIVALENT) 0.5 MG capsule     triamcinolone (KENALOG) 0.1 % external ointment     vitamin D3 (CHOLECALCIFEROL) 50 mcg (2000 units) tablet     amoxicillin (AMOXIL) 500 MG capsule     Pediatric Multivitamins-Fl (MULTIVITAMIN WITH 1 MG FLUORIDE) 1 MG CHEW     No current facility-administered medications for this visit.      Past Medical History:   Patient Active Problem List   Diagnosis     IMMUNIZATION HISTORY     **Need for SBE (subacute bacterial endocarditis) prophylaxis     Heart replaced by transplant (H)     Vaccine contraindicated due to immunosuppression - NO LIVE VACCINES/ **NO MMR, NO VARICELLA, NO LIVE INFLUENZA**     PTSD (post-traumatic stress disorder)     Severe episode of recurrent major depressive disorder, without psychotic features (H)     Insomnia     History of colitis     Coronary artery disease involving transplanted heart     Diabetes mellitus (H)     Transplanted heart (H)     Thyroid disorder screening     Lipid screening     Status post coronary angiogram     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

## 2023-06-06 NOTE — NURSING NOTE
Chief Complaint   Patient presents with     Skin Check     Areas of concern include upper arms due to eczema     Roney Rouse, EMT

## 2023-06-06 NOTE — LETTER
6/6/2023       RE: Laurel Santoyo  [address] [facility] [address]     Dear Colleague,    Thank you for referring your patient, Laurel Santoyo, to the [facility] [place] [facility]. Please see a copy of my visit note below.    [facility] Dermatology Note  Encounter Date: Jun 6, 2023  Office Visit     Dermatology Problem List:  1. Hx of heart transplant 2/17/2004 for congenital heart disease  2. Atopic dermatitis.   - triam 0.1% ointment BID    ____________________________________________    Assessment & Plan:     # History of  Heart solid organ transplantation in the setting of congenital heart disease.    - Due to the immunosuppressive medications used following transplant, solid organ transplant recipients are at a roughly 65-fold increased risk of squamous cell carcinoma, 20-fold increased risk of basal cell carcinoma, and 3.4 fold increased risk of melanoma compared to the general population.   - Prevention methods for reducing skin cancer after transplantation include avoidance of sun exposure, avoidance of indoor tanning beds or other indoor tanning devices, use of protective clothing (e.g. wide-brimmed hats, long sleeves, long pants), SPF 30 or greater sunscreen, and UV-protective sunglasses when outdoors.     # Atopic dermatitis. Chronic, stable.  - Continue PRN use of triamcinolone 0.1% ointment BID. Refills provided.     # Benign lesions: Multiple benign nevi, solar lentigos, nevus spilus. Explained to patient benign nature of lesion. No treatment is necessary at this time unless the lesion changes or becomes symptomatic.   - ABCDs of melanoma were discussed and self skin checks were advised.  - Sun precaution was advised including the use of sun screens of SPF 30 or higher, sun protective clothing, and avoidance of tanning beds.    Procedures Performed:   None    Follow-up: 1 year(s)  in-person, or earlier for new or changing lesions    Staff:     Rolando Ramirez MD  Pronouns: he/him/his    Department of Dermatology  [facility]: Phone: [phone], Fax:[phone]  [facility]: Phone: [phone] Fax: [phone]  ____________________________________________    CC: Skin Check (Areas of concern include upper arms due to eczema)    HPI:   Laurel Santoyo is a(n) 22 year old adult who presents today as a return patient for FBSE. Last seen on 12/6/22 for FBSE when no concerning lesions were noted. She was started on clindamycin 1% solution and ketoconazole 2% shampoo BIW as well as NAC for seb derm/folliculitis +/- acne excoriee on the scalp.     Today, reports:    1. Eczema on the upper and lower extremities. Not entirely consistent with topicals, but usually responds well to triam 0.1% ointment.     2. Scalp folliculitis/seb derm resolved. Not using topical therapies.     The patient otherwise denies any new or concerning lesions. No bleeding, painful, pruritic, or changing lesions. They report no personal history of skin cancer. There is a family history of skin cancer in paternal grandparents. There is a history of immunosuppression; heart transplant. No history of indoor tanning. They do use sunscreen and protective clothing when outdoors for sun protection. No occupational exposure to ultraviolet light or other forms of radiation. Health otherwise stable. No other skin concerns.      Labs Reviewed:  N/A    Physical Exam:  Vitals: There were no vitals taken for this visit.  SKIN: Full skin, which includes the head/face, both arms, chest, back, abdomen,both legs, genitalia and/or groin buttocks, digits and/or nails, was examined.  - Brown macules on sun exposed areas  - Brown macules and papules on trunk and extremities without concerning dermoscopy features  - Light  brown patch with numerous dark macules centrally on the central paraspinal back.  - Few eczematous appearing scaly thin plaques on bilateral forearms, elbows, lower extremities.   - No other lesions of concern on areas examined.     Medications:  Current Outpatient Medications   Medication    albuterol (PROAIR HFA) 108 (90 Base) MCG/ACT inhaler    aspirin EC 81 MG EC tablet    biotin 1000 MCG TABS tablet    clindamycin (CLEOCIN T) 1 % external solution    ketoconazole (NIZORAL) 2 % external shampoo    lisinopril (ZESTRIL) 10 MG tablet    Magnesium 400 MG TABS    multivitamin w/minerals (MULTI-VITAMIN) tablet    rosuvastatin (CRESTOR) 10 MG tablet    sertraline (ZOLOFT) 100 MG tablet    tacrolimus (GENERIC EQUIVALENT) 0.5 MG capsule    triamcinolone (KENALOG) 0.1 % external ointment    vitamin D3 (CHOLECALCIFEROL) 50 mcg (2000 units) tablet    amoxicillin (AMOXIL) 500 MG capsule    Pediatric Multivitamins-Fl (MULTIVITAMIN WITH 1 MG FLUORIDE) 1 MG CHEW     No current facility-administered medications for this visit.      Past Medical History:   Patient Active Problem List   Diagnosis    IMMUNIZATION HISTORY    **Need for SBE (subacute bacterial endocarditis) prophylaxis    Heart replaced by transplant (H)    Vaccine contraindicated due to immunosuppression - NO LIVE VACCINES/ **NO MMR, NO VARICELLA, NO LIVE INFLUENZA**    PTSD (post-traumatic stress disorder)    Severe episode of recurrent major depressive disorder, without psychotic features (H)    Insomnia    History of colitis    Coronary artery disease involving transplanted heart    Diabetes mellitus (H)    Transplanted heart (H)    Thyroid disorder screening    Lipid screening    Status post coronary angiogram     Past Medical History:   Diagnosis Date    Heart transplant, orthotopic, status  2/2004 08/14/2012    HLHS (hypoplastic left heart syndrome) 08/22/2007    Hx of Heart Transplant in 2/2004 at age 3 years Nov, 2008 sub-aortic membrane removal     PONV  (postoperative nausea and vomiting)     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

## 2023-06-06 NOTE — PATIENT INSTRUCTIONS
Patient Education     Checking for Skin Cancer  You can find cancer early by checking your skin each month. There are 3 kinds of skin cancer. They are melanoma, basal cell carcinoma, and squamous cell carcinoma. Doing monthly skin checks is the best way to find new marks or skin changes. Follow the instructions below for checking your skin.   The ABCDEs of checking moles for melanoma   Check your moles or growths for signs of melanoma using ABCDE:   Asymmetry: the sides of the mole or growth don t match  Border: the edges are ragged, notched, or blurred  Color: the color within the mole or growth varies  Diameter: the mole or growth is larger than 6 mm (size of a pencil eraser)  Evolving: the size, shape, or color of the mole or growth is changing (evolving is not shown in the images below)    Checking for other types of skin cancer  Basal cell carcinoma or squamous cell carcinoma have symptoms such as:     A spot or mole that looks different from all other marks on your skin  Changes in how an area feels, such as itching, tenderness, or pain  Changes in the skin's surface, such as oozing, bleeding, or scaliness  A sore that does not heal  New swelling or redness beyond the border of a mole    Who s at risk?  Anyone can get skin cancer. But you are at greater risk if you have:   Fair skin, light-colored hair, or light-colored eyes  Many moles or abnormal moles on your skin  A history of sunburns from sunlight or tanning beds  A family history of skin cancer  A history of exposure to radiation or chemicals  A weakened immune system  If you have had skin cancer in the past, you are at risk for recurring skin cancer.   How to check your skin  Do your monthly skin checkups in front of a full-length mirror. Check all parts of your body, including your:   Head (ears, face, neck, and scalp)  Torso (front, back, and sides)  Arms (tops, undersides, upper, and lower armpits)  Hands (palms, backs, and fingers, including  under the nails)  Buttocks and genitals  Legs (front, back, and sides)  Feet (tops, soles, toes, including under the nails, and between toes)  If you have a lot of moles, take digital photos of them each month. Make sure to take photos both up close and from a distance. These can help you see if any moles change over time.   Most skin changes are not cancer. But if you see any changes in your skin, call your doctor right away. Only he or she can diagnose a problem. If you have skin cancer, seeing your doctor can be the first step toward getting the treatment that could save your life.   Vook last reviewed this educational content on 4/1/2019 2000-2020 [employer]. [address] [place] [address], [place] [address]. All rights reserved. This information is not intended as a substitute for professional medical care. Always follow your healthcare professional's instructions.       When should I call my doctor?  If you are worsening or not improving, please, contact us or seek urgent care as noted below.     Who should I call with questions (adults)?  [facility] (adult and pediatric): [phone]  [facility] (adult): [phone]  For urgent needs outside of business hours call the [facility] at [phone] and ask for the dermatology resident on call to be paged  If this is a medical emergency and you are unable to reach an ER, Call [phone]    Who should I call with questions (pediatric)?  [facility]- Pediatric Dermatology  Dr. Johanny Bridges, Dr. Gina Dubose, Dr. Susan Romano, IVONNE Putnam, Dr. Ruth Billingsley, Dr. Ramya Camp & Dr. Garland Cosme  Non-urgent nurse triage line; [phone]- Sylvia and Kellie HOPPER Care Coordinatorkelly Cruz (/Complex ) [phone]    If you need a prescription refill, please contact your pharmacy. Refills are approved or denied by our  Physicians during normal business hours, Monday through Fridays  Per office policy, refills will not be granted if you have not been seen within the past year (or sooner depending on your child's condition)    Scheduling Information:  Pediatric Appointment Scheduling and Call Center [phone]  Radiology Scheduling- [phone]  Sedation Unit Scheduling- [phone]  [place] Scheduling- General [phone]; Pediatric Dermatology [phone]  Main  Services: [phone]  Romansh: [phone]  Sammarinese: [phone]  Hmong/Nauruan/French: [phone]  Preadmission Nursing Department Fax Number: [phone] (Fax all pre-operative paperwork to this number)    For urgent matters arising during evenings, weekends, or holidays that cannot wait for normal business hours please call [phone] and ask for the dermatology resident on call to be paged.

## 2023-06-20 NOTE — TELEPHONE ENCOUNTER
Laurel calls and says that she has left ankle pain. Denies any ankle trauma. Pt. Says that the ankle is only slightly swollen. Pt. Will go to her clinic or to an  clinic tomorrow.    Reason for Disposition    [1] Very swollen joint AND [2] no fever    Additional Information    Negative: Followed an ankle injury    Negative: Foot pain is main symptom    Negative: Thigh or calf pain is main symptom    Negative: Ankle swelling is main symptom    Negative: Thigh or calf swelling is main symptom    Negative: Entire foot is cool or blue in comparison to other side    Negative: [1] Swollen joint AND [2] fever    Negative: [1] Red area or streak AND [2] fever    Negative: Patient sounds very sick or weak to the triager    Negative: [1] SEVERE pain (e.g., excruciating, unable to walk) AND [2] not improved after 2 hours of pain medicine    Negative: [1] Thigh or calf pain AND [2] only 1 side AND [3] present > 1 hour    Negative: [1] Calf swelling AND [2] only 1 side    Negative: [1] Looks infected (spreading redness, pus) AND [2] large red area (> 2 in. or 5 cm)    Negative: Looks like a boil, infected sore, or deep ulcer    Negative: [1] Redness of the skin AND [2] no fever    Protocols used: ANKLE PAIN-A-AH

## 2023-11-06 NOTE — TELEPHONE ENCOUNTER
Please send new rx. Last rx sent is  and all refills have been used. Pt last filled on 2023.     Vishnu verify and send new rx    [facility]  [phone]

## 2024-01-01 ENCOUNTER — HOSPITAL ENCOUNTER (EMERGENCY)
Facility: CLINIC | Age: 24
Discharge: ANOTHER HOSPITAL | End: 2024-01-03
Attending: FAMILY MEDICINE | Admitting: FAMILY MEDICINE
Payer: COMMERCIAL

## 2024-01-01 ENCOUNTER — TELEPHONE (OUTPATIENT)
Dept: CARDIOLOGY | Facility: CLINIC | Age: 24
End: 2024-01-01
Payer: COMMERCIAL

## 2024-01-01 ENCOUNTER — APPOINTMENT (OUTPATIENT)
Dept: CT IMAGING | Facility: CLINIC | Age: 24
End: 2024-01-01
Attending: PHYSICIAN ASSISTANT
Payer: COMMERCIAL

## 2024-01-01 ENCOUNTER — APPOINTMENT (OUTPATIENT)
Dept: GENERAL RADIOLOGY | Facility: CLINIC | Age: 24
End: 2024-01-01
Attending: PHYSICIAN ASSISTANT
Payer: COMMERCIAL

## 2024-01-01 ENCOUNTER — HEALTH MAINTENANCE LETTER (OUTPATIENT)
Age: 24
End: 2024-01-01

## 2024-01-01 VITALS
HEIGHT: 65 IN | TEMPERATURE: 98.6 F | HEART RATE: 112 BPM | SYSTOLIC BLOOD PRESSURE: 108 MMHG | BODY MASS INDEX: 21.66 KG/M2 | WEIGHT: 130 LBS | RESPIRATION RATE: 22 BRPM | OXYGEN SATURATION: 95 % | DIASTOLIC BLOOD PRESSURE: 66 MMHG

## 2024-01-01 DIAGNOSIS — I71.9 DESCENDING AORTIC ANEURYSM (H): ICD-10-CM

## 2024-01-01 DIAGNOSIS — Z94.1 HISTORY OF HEART TRANSPLANT (H): ICD-10-CM

## 2024-01-01 DIAGNOSIS — I74.9 ARTERIAL THROMBOSIS (H): ICD-10-CM

## 2024-01-01 DIAGNOSIS — S30.1XXA ABDOMINAL HEMATOMA: ICD-10-CM

## 2024-01-01 LAB
ABO/RH(D): NORMAL
ALBUMIN SERPL BCG-MCNC: 4.1 G/DL (ref 3.5–5.2)
ALBUMIN SERPL BCG-MCNC: 4.4 G/DL (ref 3.5–5.2)
ALBUMIN UR-MCNC: 50 MG/DL
ALP SERPL-CCNC: 56 U/L (ref 40–150)
ALP SERPL-CCNC: 64 U/L (ref 40–150)
ALT SERPL W P-5'-P-CCNC: 10 U/L (ref 0–70)
ALT SERPL W P-5'-P-CCNC: 12 U/L (ref 0–70)
ANION GAP SERPL CALCULATED.3IONS-SCNC: 11 MMOL/L (ref 7–15)
ANION GAP SERPL CALCULATED.3IONS-SCNC: 15 MMOL/L (ref 7–15)
ANTIBODY SCREEN: NEGATIVE
APPEARANCE UR: ABNORMAL
APTT PPP: 29 SECONDS (ref 22–38)
AST SERPL W P-5'-P-CCNC: 26 U/L (ref 0–45)
AST SERPL W P-5'-P-CCNC: 27 U/L (ref 0–45)
ATRIAL RATE - MUSE: 100 BPM
BACTERIA BLD CULT: NO GROWTH
BACTERIA BLD CULT: NO GROWTH
BASOPHILS # BLD AUTO: 0 10E3/UL (ref 0–0.2)
BASOPHILS NFR BLD AUTO: 0 %
BILIRUB SERPL-MCNC: 0.3 MG/DL
BILIRUB SERPL-MCNC: 0.3 MG/DL
BILIRUB UR QL STRIP: NEGATIVE
BUN SERPL-MCNC: 17.2 MG/DL (ref 6–20)
BUN SERPL-MCNC: 17.3 MG/DL (ref 6–20)
CALCIUM SERPL-MCNC: 8.9 MG/DL (ref 8.6–10)
CALCIUM SERPL-MCNC: 9.9 MG/DL (ref 8.6–10)
CHLORIDE SERPL-SCNC: 102 MMOL/L (ref 98–107)
CHLORIDE SERPL-SCNC: 102 MMOL/L (ref 98–107)
COLOR UR AUTO: YELLOW
CREAT BLD-MCNC: 0.9 MG/DL (ref 0.5–1.3)
CREAT SERPL-MCNC: 0.98 MG/DL (ref 0.51–1.17)
CREAT SERPL-MCNC: 0.98 MG/DL (ref 0.51–1.17)
DEPRECATED HCO3 PLAS-SCNC: 19 MMOL/L (ref 22–29)
DEPRECATED HCO3 PLAS-SCNC: 24 MMOL/L (ref 22–29)
DIASTOLIC BLOOD PRESSURE - MUSE: NORMAL MMHG
EGFRCR SERPLBLD CKD-EPI 2021: 83 ML/MIN/1.73M2
EGFRCR SERPLBLD CKD-EPI 2021: 83 ML/MIN/1.73M2
EGFRCR SERPLBLD CKD-EPI 2021: >60 ML/MIN/1.73M2
EOSINOPHIL # BLD AUTO: 0.6 10E3/UL (ref 0–0.7)
EOSINOPHIL NFR BLD AUTO: 7 %
ERYTHROCYTE [DISTWIDTH] IN BLOOD BY AUTOMATED COUNT: 15.1 % (ref 10–15)
ERYTHROCYTE [DISTWIDTH] IN BLOOD BY AUTOMATED COUNT: 15.1 % (ref 10–15)
ERYTHROCYTE [DISTWIDTH] IN BLOOD BY AUTOMATED COUNT: 15.2 % (ref 10–15)
FIBRINOGEN PPP-MCNC: 208 MG/DL (ref 170–490)
FLUAV RNA SPEC QL NAA+PROBE: NEGATIVE
FLUBV RNA RESP QL NAA+PROBE: NEGATIVE
GLUCOSE SERPL-MCNC: 117 MG/DL (ref 70–99)
GLUCOSE SERPL-MCNC: 206 MG/DL (ref 70–99)
GLUCOSE UR STRIP-MCNC: NEGATIVE MG/DL
HCG SERPL QL: NEGATIVE
HCG UR QL: NEGATIVE
HCT VFR BLD AUTO: 34.4 % (ref 35–53)
HCT VFR BLD AUTO: 34.6 % (ref 35–53)
HCT VFR BLD AUTO: 37.3 % (ref 35–53)
HGB BLD-MCNC: 11.1 G/DL (ref 11.7–17.7)
HGB BLD-MCNC: 11.2 G/DL (ref 11.7–17.7)
HGB BLD-MCNC: 12.2 G/DL (ref 11.7–17.7)
HGB UR QL STRIP: NEGATIVE
HYALINE CASTS: 2 /LPF
IMM GRANULOCYTES # BLD: 0 10E3/UL
IMM GRANULOCYTES NFR BLD: 0 %
INR PPP: 1.27 (ref 0.85–1.15)
INTERPRETATION ECG - MUSE: NORMAL
KETONES UR STRIP-MCNC: NEGATIVE MG/DL
LACTATE SERPL-SCNC: 1 MMOL/L (ref 0.7–2)
LACTATE SERPL-SCNC: 1.4 MMOL/L (ref 0.7–2)
LEUKOCYTE ESTERASE UR QL STRIP: NEGATIVE
LYMPHOCYTES # BLD AUTO: 2.7 10E3/UL (ref 0.8–5.3)
LYMPHOCYTES NFR BLD AUTO: 30 %
MCH RBC QN AUTO: 25.2 PG (ref 26.5–33)
MCH RBC QN AUTO: 25.3 PG (ref 26.5–33)
MCH RBC QN AUTO: 25.3 PG (ref 26.5–33)
MCHC RBC AUTO-ENTMCNC: 32.3 G/DL (ref 31.5–36.5)
MCHC RBC AUTO-ENTMCNC: 32.4 G/DL (ref 31.5–36.5)
MCHC RBC AUTO-ENTMCNC: 32.7 G/DL (ref 31.5–36.5)
MCV RBC AUTO: 77 FL (ref 78–100)
MCV RBC AUTO: 78 FL (ref 78–100)
MCV RBC AUTO: 78 FL (ref 78–100)
MONOCYTES # BLD AUTO: 0.6 10E3/UL (ref 0–1.3)
MONOCYTES NFR BLD AUTO: 7 %
MUCOUS THREADS #/AREA URNS LPF: PRESENT /LPF
NEUTROPHILS # BLD AUTO: 5 10E3/UL (ref 1.6–8.3)
NEUTROPHILS NFR BLD AUTO: 56 %
NITRATE UR QL: NEGATIVE
NRBC # BLD AUTO: 0 10E3/UL
NRBC BLD AUTO-RTO: 0 /100
NT-PROBNP SERPL-MCNC: 460 PG/ML (ref 0–450)
NT-PROBNP SERPL-MCNC: 570 PG/ML (ref 0–450)
P AXIS - MUSE: 30 DEGREES
PH UR STRIP: 5.5 [PH] (ref 5–7)
PLATELET # BLD AUTO: 211 10E3/UL (ref 150–450)
PLATELET # BLD AUTO: 221 10E3/UL (ref 150–450)
PLATELET # BLD AUTO: 244 10E3/UL (ref 150–450)
POTASSIUM SERPL-SCNC: 3.4 MMOL/L (ref 3.4–5.3)
POTASSIUM SERPL-SCNC: 4.7 MMOL/L (ref 3.4–5.3)
PR INTERVAL - MUSE: 120 MS
PROT SERPL-MCNC: 7.1 G/DL (ref 6.4–8.3)
PROT SERPL-MCNC: 7.9 G/DL (ref 6.4–8.3)
QRS DURATION - MUSE: 134 MS
QT - MUSE: 366 MS
QTC - MUSE: 472 MS
R AXIS - MUSE: 100 DEGREES
RADIOLOGIST FLAGS: ABNORMAL
RADIOLOGIST FLAGS: ABNORMAL
RBC # BLD AUTO: 4.39 10E6/UL (ref 3.8–5.9)
RBC # BLD AUTO: 4.44 10E6/UL (ref 3.8–5.9)
RBC # BLD AUTO: 4.82 10E6/UL (ref 3.8–5.9)
RBC URINE: 1 /HPF
RSV RNA SPEC NAA+PROBE: NEGATIVE
SARS-COV-2 RNA RESP QL NAA+PROBE: NEGATIVE
SODIUM SERPL-SCNC: 136 MMOL/L (ref 135–145)
SODIUM SERPL-SCNC: 137 MMOL/L (ref 135–145)
SP GR UR STRIP: 1.02 (ref 1–1.03)
SPECIMEN EXPIRATION DATE: NORMAL
SQUAMOUS EPITHELIAL: 8 /HPF
SYSTOLIC BLOOD PRESSURE - MUSE: NORMAL MMHG
T AXIS - MUSE: -72 DEGREES
TROPONIN T SERPL HS-MCNC: <6 NG/L
UROBILINOGEN UR STRIP-MCNC: NORMAL MG/DL
VENTRICULAR RATE- MUSE: 100 BPM
WBC # BLD AUTO: 12.6 10E3/UL (ref 4–11)
WBC # BLD AUTO: 19 10E3/UL (ref 4–11)
WBC # BLD AUTO: 9 10E3/UL (ref 4–11)
WBC URINE: 4 /HPF

## 2024-01-01 PROCEDURE — 82040 ASSAY OF SERUM ALBUMIN: CPT | Performed by: PHYSICIAN ASSISTANT

## 2024-01-01 PROCEDURE — 74174 CTA ABD&PLVS W/CONTRAST: CPT

## 2024-01-01 PROCEDURE — 71046 X-RAY EXAM CHEST 2 VIEWS: CPT | Mod: 26 | Performed by: RADIOLOGY

## 2024-01-01 PROCEDURE — 99291 CRITICAL CARE FIRST HOUR: CPT | Mod: 25 | Performed by: FAMILY MEDICINE

## 2024-01-01 PROCEDURE — 84703 CHORIONIC GONADOTROPIN ASSAY: CPT | Performed by: PHYSICIAN ASSISTANT

## 2024-01-01 PROCEDURE — 71275 CT ANGIOGRAPHY CHEST: CPT | Mod: 26 | Performed by: RADIOLOGY

## 2024-01-01 PROCEDURE — 96375 TX/PRO/DX INJ NEW DRUG ADDON: CPT | Mod: 59 | Performed by: FAMILY MEDICINE

## 2024-01-01 PROCEDURE — 96361 HYDRATE IV INFUSION ADD-ON: CPT | Performed by: FAMILY MEDICINE

## 2024-01-01 PROCEDURE — 96366 THER/PROPH/DIAG IV INF ADDON: CPT | Performed by: FAMILY MEDICINE

## 2024-01-01 PROCEDURE — 83605 ASSAY OF LACTIC ACID: CPT | Mod: 91

## 2024-01-01 PROCEDURE — 250N000011 HC RX IP 250 OP 636: Performed by: FAMILY MEDICINE

## 2024-01-01 PROCEDURE — 71046 X-RAY EXAM CHEST 2 VIEWS: CPT

## 2024-01-01 PROCEDURE — 85025 COMPLETE CBC W/AUTO DIFF WBC: CPT | Performed by: PHYSICIAN ASSISTANT

## 2024-01-01 PROCEDURE — 81001 URINALYSIS AUTO W/SCOPE: CPT | Performed by: PHYSICIAN ASSISTANT

## 2024-01-01 PROCEDURE — 83605 ASSAY OF LACTIC ACID: CPT | Performed by: PHYSICIAN ASSISTANT

## 2024-01-01 PROCEDURE — 82565 ASSAY OF CREATININE: CPT

## 2024-01-01 PROCEDURE — 84155 ASSAY OF PROTEIN SERUM: CPT | Mod: 91

## 2024-01-01 PROCEDURE — 93005 ELECTROCARDIOGRAM TRACING: CPT | Performed by: FAMILY MEDICINE

## 2024-01-01 PROCEDURE — 84484 ASSAY OF TROPONIN QUANT: CPT | Performed by: PHYSICIAN ASSISTANT

## 2024-01-01 PROCEDURE — 36415 COLL VENOUS BLD VENIPUNCTURE: CPT | Performed by: PHYSICIAN ASSISTANT

## 2024-01-01 PROCEDURE — 99292 CRITICAL CARE ADDL 30 MIN: CPT | Mod: 25 | Performed by: FAMILY MEDICINE

## 2024-01-01 PROCEDURE — 86900 BLOOD TYPING SEROLOGIC ABO: CPT | Performed by: FAMILY MEDICINE

## 2024-01-01 PROCEDURE — 258N000003 HC RX IP 258 OP 636: Performed by: PHYSICIAN ASSISTANT

## 2024-01-01 PROCEDURE — 74174 CTA ABD&PLVS W/CONTRAST: CPT | Mod: 26 | Performed by: RADIOLOGY

## 2024-01-01 PROCEDURE — 85384 FIBRINOGEN ACTIVITY: CPT

## 2024-01-01 PROCEDURE — 82247 BILIRUBIN TOTAL: CPT | Mod: 91

## 2024-01-01 PROCEDURE — 85027 COMPLETE CBC AUTOMATED: CPT

## 2024-01-01 PROCEDURE — 87040 BLOOD CULTURE FOR BACTERIA: CPT | Performed by: PHYSICIAN ASSISTANT

## 2024-01-01 PROCEDURE — 36415 COLL VENOUS BLD VENIPUNCTURE: CPT

## 2024-01-01 PROCEDURE — 81025 URINE PREGNANCY TEST: CPT | Performed by: PHYSICIAN ASSISTANT

## 2024-01-01 PROCEDURE — 96365 THER/PROPH/DIAG IV INF INIT: CPT | Mod: 59 | Performed by: FAMILY MEDICINE

## 2024-01-01 PROCEDURE — 85610 PROTHROMBIN TIME: CPT

## 2024-01-01 PROCEDURE — 250N000011 HC RX IP 250 OP 636: Performed by: PHYSICIAN ASSISTANT

## 2024-01-01 PROCEDURE — 83880 ASSAY OF NATRIURETIC PEPTIDE: CPT

## 2024-01-01 PROCEDURE — 85027 COMPLETE CBC AUTOMATED: CPT | Performed by: PHYSICIAN ASSISTANT

## 2024-01-01 PROCEDURE — 83880 ASSAY OF NATRIURETIC PEPTIDE: CPT | Performed by: PHYSICIAN ASSISTANT

## 2024-01-01 PROCEDURE — 96376 TX/PRO/DX INJ SAME DRUG ADON: CPT | Mod: 59 | Performed by: FAMILY MEDICINE

## 2024-01-01 PROCEDURE — 93010 ELECTROCARDIOGRAM REPORT: CPT | Performed by: FAMILY MEDICINE

## 2024-01-01 PROCEDURE — 85730 THROMBOPLASTIN TIME PARTIAL: CPT

## 2024-01-01 PROCEDURE — 87637 SARSCOV2&INF A&B&RSV AMP PRB: CPT | Performed by: PHYSICIAN ASSISTANT

## 2024-01-01 RX ORDER — HEPARIN SODIUM 10000 [USP'U]/100ML
0-5000 INJECTION, SOLUTION INTRAVENOUS CONTINUOUS
Status: DISCONTINUED | OUTPATIENT
Start: 2024-01-01 | End: 2024-01-01 | Stop reason: HOSPADM

## 2024-01-01 RX ORDER — FENTANYL CITRATE 50 UG/ML
12.5 INJECTION, SOLUTION INTRAMUSCULAR; INTRAVENOUS ONCE
Status: COMPLETED | OUTPATIENT
Start: 2024-01-01 | End: 2024-01-01

## 2024-01-01 RX ORDER — ESMOLOL HYDROCHLORIDE 20 MG/ML
50-300 INJECTION, SOLUTION INTRAVENOUS CONTINUOUS
Status: DISCONTINUED | OUTPATIENT
Start: 2024-01-01 | End: 2024-01-01

## 2024-01-01 RX ORDER — IOPAMIDOL 755 MG/ML
90 INJECTION, SOLUTION INTRAVASCULAR ONCE
Status: COMPLETED | OUTPATIENT
Start: 2024-01-01 | End: 2024-01-01

## 2024-01-01 RX ORDER — HEPARIN SODIUM 10000 [USP'U]/100ML
0-5000 INJECTION, SOLUTION INTRAVENOUS CONTINUOUS
Status: DISCONTINUED | OUTPATIENT
Start: 2024-01-01 | End: 2024-01-01

## 2024-01-01 RX ORDER — ONDANSETRON 2 MG/ML
4 INJECTION INTRAMUSCULAR; INTRAVENOUS ONCE
Status: COMPLETED | OUTPATIENT
Start: 2024-01-01 | End: 2024-01-01

## 2024-01-01 RX ORDER — FENTANYL CITRATE 50 UG/ML
25 INJECTION, SOLUTION INTRAMUSCULAR; INTRAVENOUS ONCE
Status: COMPLETED | OUTPATIENT
Start: 2024-01-01 | End: 2024-01-01

## 2024-01-01 RX ADMIN — NICARDIPINE HYDROCHLORIDE 2.5 MG/HR: 0.2 INJECTION, SOLUTION INTRAVENOUS at 18:17

## 2024-01-01 RX ADMIN — ONDANSETRON 4 MG: 2 INJECTION INTRAMUSCULAR; INTRAVENOUS at 19:44

## 2024-01-01 RX ADMIN — HEPARIN SODIUM AND DEXTROSE 1050 UNITS/HR: 10000; 5 INJECTION INTRAVENOUS at 21:36

## 2024-01-01 RX ADMIN — FENTANYL CITRATE 25 MCG: 50 INJECTION, SOLUTION INTRAMUSCULAR; INTRAVENOUS at 21:14

## 2024-01-01 RX ADMIN — FENTANYL CITRATE 12.5 MCG: 50 INJECTION, SOLUTION INTRAMUSCULAR; INTRAVENOUS at 19:03

## 2024-01-01 RX ADMIN — FENTANYL CITRATE 12.5 MCG: 50 INJECTION, SOLUTION INTRAMUSCULAR; INTRAVENOUS at 19:44

## 2024-01-01 RX ADMIN — SODIUM CHLORIDE 1000 ML: 9 INJECTION, SOLUTION INTRAVENOUS at 15:36

## 2024-01-01 RX ADMIN — IOPAMIDOL 90 ML: 755 INJECTION, SOLUTION INTRAVENOUS at 16:37

## 2024-01-01 ASSESSMENT — ACTIVITIES OF DAILY LIVING (ADL)
ADLS_ACUITY_SCORE: 37
ADLS_ACUITY_SCORE: 35

## 2024-01-03 NOTE — TELEPHONE ENCOUNTER
Pt called stating about an hour ago she developed shaking, nausea, and tingling/numbness down bilateral lower extremities. She states she can hardly walk. She was walking independently prior to this incident. No fever. No recent injury/fall. No spider bite that she recalls. Pt is home with siblings. Pt advised to go to ER to be evaluated.

## 2024-01-03 NOTE — CONSULTS
[facility]    Consult Note - Vascular SurgeryService  Date of Admission:  1/3/2024  Consult Requested by: ED  Reason for Consult: Hx heart transplant now with large aortic aneurysm and significant arterial emboli     Assessment & Plan: Surgery   Laurel Santoyo is a 23 year old adult w/ complex cardiac history including heart transplant in 2004 c/b subaortic obstruction requiring septal myotomy and Stoll procedure in 2008 as well as aortic arch narrowing s/p balloon dilation and stent placement of proximal descending aorta in 2012. Aortic stent had migrated by late 2012 and is now seated at level of celiac trunk. 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     - Emergent transfer to [facility] for possible aortic repair   - NPO  - Keep SBP<120, HR<70   - q1h neurovascular checks     Discussed with fellow Dr. Arias who discussed with staff Dr. Hurtado.     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  She underwent heart cath in July 2011 for concern over aortic arch narrowing by echo, and heart cath was complicated by episode of complete heart block requiring cpr with  spontaneous recovery of rhythm. She was found to have both ascending aortic narrowing at anastomotic site as well as mild descending aortic narrowing. She underwent further balloon dilation of her distal aortic arch and stent placement within the descending aorta in 2012. Echo in November of 2012 showed that the aortic stent had migrated to the abdominal aorta; patient was experiencing abdominal pain and weight loss at the time concerning for mesenteric ischemia.  A CT angiogram was done for further evaluation which showed that while the stent covers the origins of the celiac trunk and left hepatic/gastric arteries, these arteries were widely patent. The SMA and JERRY arteries were also widely patent.     She now presents to the ED with new onset left sided chest discomfort with associated left shoulder pain since 1/1/24, SOB, and hot flashes that started last night. She states that around new year's day, she started to experience left sided chest discomfort with left shoulder pain that was achy in character. She then started to experience hot flashes with a subjective feeling of BLE weakness and altered sensation/tingling in BLE that felt concerning and prompted ED visit. Here she states that BLE weakness and tingling has essentially resolved with the exception of mild tingling in her bilateral feet. She is able to ambulate without issues but states ambulation causes right hip/buttock pain suggestive of buttock claudication. Denies ongoing shortness of breath or chest discomfort but is endorsing lower back pain, primarily left sided. Denies fevers or chills but has an ongoing productive cough. Denies bloody stools or dark tarry stools. Denies dysuria or hematuria.     Labs on admission grossly normal including normal lactate of 1, normal troponin. BNP elevated to 570. Viral panel normal. CTA concerning for acute proximal descending thoracic aortic aneurysm measuring 5.4 cm with mural irregularity suggesting adjacent  thrombus distal to left SCA origin. Multifocal arterial thrombi present associated with aortic stent at level of celiac artery, in proximal renal artery and in distal SMA, and in infrarenal abdominal aorta to bilateral ONEAL. No acute findings of bowel ischemia.     Past Medical History    Past Medical History:   Diagnosis Date    Heart transplant, orthotopic, status  2/2004 08/14/2012    HLHS (hypoplastic left heart syndrome) 08/22/2007    Hx of Heart Transplant in 2/2004 at age 3 years Nov, 2008 sub-aortic membrane removal     PONV (postoperative nausea and vomiting)     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       Past Surgical History   Past Surgical History:   Procedure Laterality Date    ANGIOGRAPHY  8/14/2012    Procedure: ANGIOGRAPHY;;  Surgeon: Nick Hutchinson MD;  Location: [facility] OR    CV CORONARY ANGIOGRAM N/A 4/13/2023    Procedure: Coronary Angiogram;  Surgeon: Dieudonne Lorenzo MD;  [facility]    CV RIGHT HEART CATH MEASUREMENTS RECORDED N/A 4/13/2023    Procedure: Right Heart Catheterization;  Surgeon: Dieudonne Lorenzo MD;  [facility]    HEART CATH CHILD  7/11/2011    Procedure:HEART CATH CHILD; Right Left, Biopsy and Possible Coarct Stent; Surgeon:NICK HUTCHINSON; Location:[facility] OR    HEART CATH CHILD  8/14/2012    Procedure: HEART CATH CHILD;  Left Heart Cath, Coronary Angiogram with Possible Balloon Dilatation of Aorta;  Surgeon: Nick Hutchinson MD;  Location: [facility] OR    INSERT PICC LINE  11/7/2012    Procedure: INSERT PICC LINE;  Picc Line Placement;  Surgeon: ANNA Bailey MD;  Location: [facility] OR    ZC TRANSPLANTATION OF HEART  2/2004       Prior to Admission Medications   Current Facility-Administered Medications   Medication    niCARdipine 40 mg in 200 mL NS (CARDENE) infusion     Current Outpatient Medications   Medication Sig    albuterol (PROAIR HFA) 108 (90 Base) MCG/ACT inhaler Inhale 2 puffs into  the lungs every 4 hours as needed for shortness of breath / dyspnea    amoxicillin (AMOXIL) 500 MG capsule TAKE 4 CAPSULES BY MOUTH ONCE FOR 1 DOSE, 30-60 MINUTES PRIOR TO DENTAL CLEANING    aspirin EC 81 MG EC tablet Take 1 tablet by mouth daily.    biotin 1000 MCG TABS tablet Take 2,000 mcg by mouth daily    clindamycin (CLEOCIN T) 1 % external solution Apply 10 drops to scalp at nighttime before bed as needed for scalp folliculitis    ketoconazole (NIZORAL) 2 % external shampoo Lather onto scalp in shower at least twice weekly and let sit for 2-3 minutes before rinsing    lisinopril (ZESTRIL) 10 MG tablet TAKE ONE TABLET BY MOUTH ONCE DAILY    Magnesium 400 MG TABS Take 400 mg by mouth daily    multivitamin w/minerals (MULTI-VITAMIN) tablet Take 1 tablet by mouth daily    Pediatric Multivitamins-Fl (MULTIVITAMIN WITH 1 MG FLUORIDE) 1 MG CHEW Take 1 tablet by mouth daily (Patient not taking: Reported on 6/6/2023)    rosuvastatin (CRESTOR) 10 MG tablet Take 1 tablet (10 mg) by mouth daily    sertraline (ZOLOFT) 100 MG tablet Take 1 tablet (100 mg) by mouth daily +SCHEDULE FOLLOW UP+    tacrolimus (GENERIC EQUIVALENT) 0.5 MG capsule TAKE TWO CAPSULES BY MOUTH TWICE A DAY    triamcinolone (KENALOG) 0.1 % external ointment Apply twice daily as needed for rash on trunk or extremities. Do not use for more than 2 weeks at a time.    vitamin D3 (CHOLECALCIFEROL) 50 mcg (2000 units) tablet Take 1 tablet (50 mcg) by mouth daily          Review of Systems    The 10 point Review of Systems is negative other than noted in the HPI or here.      Physical Exam   Vital Signs: Temp: 98.4  F (36.9  C) Temp src: Oral BP: (!) 161/87 Pulse: 93   Resp: 22 SpO2: 97 % O2 Device: None (Room air)    Weight: 130 lbs 0 oz  No intake or output data in the 24 hours ending 01/03/24 4255  Constitutional: awake, alert, cooperative, no apparent distress, and appears stated age  Eyes: no scleral icterus, PEERL   Respiratory: nonlabored breathing on  room air   Cardiovascular: RRR, hypertensive   GI: abdomen soft, NT, ND. Left low  to palpation without CVA tenderness   Skin: no bruising or bleeding, normal skin color, texture, turgor, and no redness, warmth, or swelling  Musculoskeletal: 5/5 strength of BLE knee extension and hip flexion in bed. 5/5 foot dorsiflexion and plantarflexion bilaterally. Biphasic doppler signals in BLE of DP and PT arteries. Intact sensation throughout BLE. Pulses palpable and equal in BUE.   Neurologic: CN II-XII grossly intact, GONGORA, Aox3           Data   Recent Labs   Lab 01/03/24  1605 01/03/24  1528   WBC  --  9.0   HGB  --  12.2   MCV  --  77*   PLT  --  221   NA  --  137   POTASSIUM  --  4.7   CHLORIDE  --  102   CO2  --  24   BUN  --  17.2   CR 0.9 0.98   ANIONGAP  --  11   CHICA  --  9.9   GLC  --  117*   ALBUMIN  --  4.4   PROTTOTAL  --  7.9   BILITOTAL  --  0.3   ALKPHOS  --  64   ALT  --  12   AST  --  27

## 2024-01-03 NOTE — ED PROVIDER NOTES
"ED Provider Note  [facility]      History     Chief Complaint   Patient presents with    Fever     HPI  Laurel Santoyo is a 23 year old adult with assigned sex female history of diabetes, heart transplant 2004, history of postop aortic arch obstruction, who presents to the emergency department tonight with concerns for hot flash, chest pain and dyspnea.  Patient presents alone.    She states that earlier today around 11 AM, she was not exerting herself, and had other abrupt onset of a \"hot flash\" while she was at the veterinary office.  She notes that this lasted about 2 minutes and resolved on its own.  Since then she notes also associated sensation of weakness in her lower legs.  She did not have any falls with this.  She not syncopized.  She is not having any associated headaches.    She notes since New Year's she has been dealing with some left-sided chest discomfort with involvement into the left shoulder.  This is associated with dyspnea.  She has not had any associated fevers, she has not had any rhinorrhea congestion or cough noting that she has chronic respiratory symptoms during the wintertime usually which is unchanged.  She has not had any vomiting, reports generalized abdominal discomfort with her symptoms since the \"hot flash\" earlier in the day.  She denies any urinary symptoms.  No concern for pregnancy.  No change in bowel movements.  She has been taking her medications including immunosuppressants as directed.    Past Medical History  Past Medical History:   Diagnosis Date    Heart transplant, orthotopic, status  2/2004 08/14/2012    HLHS (hypoplastic left heart syndrome) 08/22/2007    Hx of Heart Transplant in 2/2004 at age 3 years Nov, 2008 sub-aortic membrane removal     PONV (postoperative nausea and vomiting)     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     Past Surgical History:   Procedure Laterality Date    ANGIOGRAPHY  8/14/2012    Procedure: " ANGIOGRAPHY;;  Surgeon: Melanie Hutchinson MD;  Location: [facility] OR    CV CORONARY ANGIOGRAM N/A 4/13/2023    Procedure: Coronary Angiogram;  Surgeon: Dieudonne Lorenzo MD;  Location:  HEART CARDIAC CATH LAB    CV RIGHT HEART CATH MEASUREMENTS RECORDED N/A 4/13/2023    Procedure: Right Heart Catheterization;  Surgeon: Dieudonne Lorenzo MD;  [facility]    HEART CATH CHILD  7/11/2011    Procedure:HEART CATH CHILD; Right Left, Biopsy and Possible Coarct Stent; Surgeon:MELANIE HUTCHINSON; Location:[facility] OR    HEART CATH CHILD  8/14/2012    Procedure: HEART CATH CHILD;  Left Heart Cath, Coronary Angiogram with Possible Balloon Dilatation of Aorta;  Surgeon: Melanie Hutchinson MD;  Location: [facility] OR    INSERT PICC LINE  11/7/2012    Procedure: INSERT PICC LINE;  Picc Line Placement;  Surgeon: ANNA Bailey MD;  Location: [facility] OR    ZZC TRANSPLANTATION OF HEART  2/2004     albuterol (PROAIR HFA) 108 (90 Base) MCG/ACT inhaler  amoxicillin (AMOXIL) 500 MG capsule  aspirin EC 81 MG EC tablet  biotin 1000 MCG TABS tablet  clindamycin (CLEOCIN T) 1 % external solution  ketoconazole (NIZORAL) 2 % external shampoo  lisinopril (ZESTRIL) 10 MG tablet  Magnesium 400 MG TABS  multivitamin w/minerals (MULTI-VITAMIN) tablet  Pediatric Multivitamins-Fl (MULTIVITAMIN WITH 1 MG FLUORIDE) 1 MG CHEW  rosuvastatin (CRESTOR) 10 MG tablet  sertraline (ZOLOFT) 100 MG tablet  tacrolimus (GENERIC EQUIVALENT) 0.5 MG capsule  triamcinolone (KENALOG) 0.1 % external ointment  vitamin D3 (CHOLECALCIFEROL) 50 mcg (2000 units) tablet      No Known Allergies  Family History  Family History   Problem Relation Age of Onset    Other - See Comments Mother 18        migraine headaches    Other - See Comments Maternal Grandfather         mental health    Skin Cancer Paternal Grandmother     Melanoma No family hx of      Social History   Social History     Tobacco Use    Smoking status: Never    Smokeless  "tobacco: Never   Vaping Use    Vaping Use: Never used   Substance Use Topics    Alcohol use: Yes     Comment: Seldom/Occ    Drug use: Never         A medically appropriate review of systems was performed with pertinent positives and negatives noted in the HPI, and all other systems negative.    Physical Exam   BP: (!) 132/90  Pulse: 110  Temp: 98.4  F (36.9  C)  Resp: 18  Height: 165.1 cm (5' 5\")  Weight: 59 kg (130 lb)  SpO2: 97 %  Physical Exam      GENERAL APPEARANCE: The patient is well developed, well appearing, and in no acute distress.  HEAD:  Normocephalic and atraumatic.   EENT: Voice normal.  NECK: Trachea is midline.No lymphadenopathy or tenderness.  LUNGS: Breath sounds are equal and clear bilaterally. No wheezes, rhonchi, or rales.  HEART: Borderline tachycardic rate and normal rhythm.  Patient has systolic murmur present.  ABDOMEN: Soft, flat, and benign.  Patient reports mild discomfort throughout without any focal tenderness.  EXTREMITIES: No cyanosis, clubbing, or edema.  Lower extremities equally warm, no unilateral leg swelling, unable to palpate PT pulses 2+ bilaterally.  NEUROLOGIC: No focal sensory or motor deficits are noted.  Cranial nerves II through XII are intact.  Rapid alternating movements, finger-to-nose testing is intact.   strength is 5 out of 5 bilaterally.  Resisted plantarflexion dorsiflexion is 5 out of 5 bilaterally.  PSYCHIATRIC: The patient is awake, alert.  Appropriate mood and affect.  SKIN: Warm, dry, and well perfused. Good turgor.    ED Course, Procedures, & Data        EKG 1/3/2024:    Normal sinus rhythm, ventricular rate 100 QTc 472.  When interpreted by myself, there is borderline tachycardic rhythm present.  There is a P wave before every QRS complex.  No visible ST elevation or depression present to suggest ischemia.  I do appreciate some T wave changes in leads V4 and V5 new from prior 4/13/2023.       Results for orders placed or performed during the hospital " encounter of 01/03/24   XR Chest 2 Views     Status: Abnormal   Result Value Ref Range    Radiologist flags Enlarging aorta, concern for enlarging (Urgent)     Narrative    Exam: XR CHEST 2 VIEWS, 1/3/2024 3:22 PM    Indication: chest pain, heart transplant    Comparison: Chest radiograph 4/14/2023, 11/15/2022    Findings:   PA and lateral views of the chest. Postsurgical changes of prior  cardiac transplant with intact median cerclage wires and surgical  clips and coils in place.     Trachea is midline. Increasing rounded density over the expected  location of the of the aortic knob is new from 4/14/2023 and  concerning for enlarging aorta. Remaining cardiac silhouette is stable  from prior. No focal consolidation. No pneumothorax or significant  pleural effusion.       Impression    Impression: Enlarging silhouette of the aortic knob concerning for  aortic aneurysm versus postoperative pseudoaneurysm, recommend  obtaining urgent gated CT angiogram of thoracic aorta.  [Urgent Result: Enlarging aorta, concern for enlarging  aneurysm]/pseudoaneurysm    Finding was identified on 1/3/2024 3:23 PM.     Tiffany Christiano was contacted by Dr. Keerthi Rosenbaum at 1/3/2024 3:33  PM and verbalized understanding of the urgent finding.     I have personally reviewed the examination and initial interpretation  and I agree with the findings.    MALLORY LOPEZ MD         SYSTEM ID:  [id number]   CTA Chest Abdomen Pelvis w Contrast     Status: None (Preliminary result)    Impression    RESIDENT PRELIMINARY INTERPRETATION  IMPRESSION:   1. 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  This is located just distal to the left subclavian artery origin. No  discrete aortic dissection is present. Recommend vascular surgery  consult.  2. There are multifocal arterial thrombi present, for example  associated with the aortic stent at the level of the  "celiac artery, in  the proximal right renal artery, in the distal superior mesenteric  artery, and in the infrarenal abdominal aorta extending into the right  greater than left common iliac arteries. There are no imaging findings  suggesting bowel ischemia.  3. Large hematoma in the right lower abdominal quadrant measuring up  to 7.4 cm. No active extravasation.  4. Post surgical changes of cardiac transplantation. Stable size of  the aortic anastomosis at the isthmus.  5. Emphysematous changes greatest in the left lower lobe.    These findings were communicated to IVONNE Gómez by Dr. Efrem Solorzano at 1/3/2024 5:26 PM via telephone and understanding was  verbalized. Findings also discussed with general surgery at 5:41 PM.   Comprehensive metabolic panel     Status: Abnormal   Result Value Ref Range    Sodium 137 135 - 145 mmol/L    Potassium 4.7 3.4 - 5.3 mmol/L    Carbon Dioxide (CO2) 24 22 - 29 mmol/L    Anion Gap 11 7 - 15 mmol/L    Urea Nitrogen 17.2 6.0 - 20.0 mg/dL    Creatinine 0.98 0.51 - 1.17 mg/dL    GFR Estimate 83 >60 mL/min/1.73m2    Calcium 9.9 8.6 - 10.0 mg/dL    Chloride 102 98 - 107 mmol/L    Glucose 117 (H) 70 - 99 mg/dL    Alkaline Phosphatase 64 40 - 150 U/L    AST 27 0 - 45 U/L    ALT 12 0 - 70 U/L    Protein Total 7.9 6.4 - 8.3 g/dL    Albumin 4.4 3.5 - 5.2 g/dL    Bilirubin Total 0.3 <=1.2 mg/dL    Narrative    The generation of reference intervals for this test is currently based on binary male or female sex. If the electronic health record information indicates another gender identity or if Legal Sex is recorded as \"Unknown\", both male and female reference intervals are provided where applicable, and should be considered according to the individual's appropriate clinical context.   Troponin T, High Sensitivity     Status: Normal   Result Value Ref Range    Troponin T, High Sensitivity <6 <=22 ng/L   Nt probnp inpatient (BNP)     Status: Abnormal   Result Value Ref Range    N " terminal Pro BNP Inpatient 570 (H) 0 - 450 pg/mL   HCG qualitative pregnancy (blood)     Status: Normal   Result Value Ref Range    hCG Serum Qualitative Negative Negative   UA with Microscopic reflex to Culture     Status: Abnormal    Specimen: Urine, Clean Catch   Result Value Ref Range    Color Urine Yellow Colorless, Straw, Light Yellow, Yellow    Appearance Urine Slightly Cloudy (A) Clear    Glucose Urine Negative Negative mg/dL    Bilirubin Urine Negative Negative    Ketones Urine Negative Negative mg/dL    Specific Gravity Urine 1.019 1.003 - 1.035    Blood Urine Negative Negative    pH Urine 5.5 5.0 - 7.0    Protein Albumin Urine 50 (A) Negative mg/dL    Urobilinogen Urine Normal Normal, 2.0 mg/dL    Nitrite Urine Negative Negative    Leukocyte Esterase Urine Negative Negative    Mucus Urine Present (A) None Seen /LPF    RBC Urine 1 <=2 /HPF    WBC Urine 4 <=5 /HPF    Squamous Epithelials Urine 8 (H) <=1 /HPF    Hyaline Casts Urine 2 <=2 /LPF    Narrative    Urine Culture not indicated   Symptomatic Influenza A/B, RSV, & SARS-CoV2 PCR (COVID-19) Nasopharyngeal     Status: Normal    Specimen: Nasopharyngeal; Swab   Result Value Ref Range    Influenza A PCR Negative Negative    Influenza B PCR Negative Negative    RSV PCR Negative Negative    SARS CoV2 PCR Negative Negative    Narrative    Testing was performed using the Xpert Xpress CoV2/Flu/RSV Assay on the [employer] GeneXpert Instrument. This test should be ordered for the detection of SARS-CoV-2, influenza, and RSV viruses in individuals who meet clinical and/or epidemiological criteria. Test performance is unknown in asymptomatic patients. This test is for in vitro diagnostic use under the FDA EUA for laboratories certified under CLIA to perform high or moderate complexity testing. This test has not been FDA cleared or approved. A negative result does not rule out the presence of PCR inhibitors in the specimen or target RNA in concentration below the limit  "of detection for the assay. If only one viral target is positive but coinfection with multiple targets is suspected, the sample should be re-tested with another FDA cleared, approved, or authorized test, if coinfection would change clinical management. This test was validated by the [facility]. These laboratories are certified under the Clinical Laboratory Improvement Amendments of 1988 (CLIA-88) as qualified to perform high complexity laboratory testing.   Lactic acid whole blood     Status: Normal   Result Value Ref Range    Lactic Acid 1.0 0.7 - 2.0 mmol/L   CBC with platelets and differential     Status: Abnormal   Result Value Ref Range    WBC Count 9.0 4.0 - 11.0 10e3/uL    RBC Count 4.82 3.80 - 5.90 10e6/uL    Hemoglobin 12.2 11.7 - 17.7 g/dL    Hematocrit 37.3 35.0 - 53.0 %    MCV 77 (L) 78 - 100 fL    MCH 25.3 (L) 26.5 - 33.0 pg    MCHC 32.7 31.5 - 36.5 g/dL    RDW 15.1 (H) 10.0 - 15.0 %    Platelet Count 221 150 - 450 10e3/uL    % Neutrophils 56 %    % Lymphocytes 30 %    % Monocytes 7 %    % Eosinophils 7 %    % Basophils 0 %    % Immature Granulocytes 0 %    NRBCs per 100 WBC 0 <1 /100    Absolute Neutrophils 5.0 1.6 - 8.3 10e3/uL    Absolute Lymphocytes 2.7 0.8 - 5.3 10e3/uL    Absolute Monocytes 0.6 0.0 - 1.3 10e3/uL    Absolute Eosinophils 0.6 0.0 - 0.7 10e3/uL    Absolute Basophils 0.0 0.0 - 0.2 10e3/uL    Absolute Immature Granulocytes 0.0 <=0.4 10e3/uL    Absolute NRBCs 0.0 10e3/uL    Narrative    The generation of reference intervals for this test is currently based on binary male or female sex. If the electronic health record information indicates another gender identity or if Legal Sex is recorded as \"Unknown\", both male and female reference intervals are provided where applicable, and should be considered according to the individual's appropriate clinical context.   HCG qualitative urine     Status: Normal   Result Value Ref Range    hCG Urine Qualitative Negative Negative " "  Creatinine POCT     Status: Normal   Result Value Ref Range    Creatinine POCT 0.9 0.5 - 1.3 mg/dL    GFR, ESTIMATED POCT >60 >60 mL/min/1.73m2    Narrative    The generation of reference intervals for this test is currently based on binary male or female sex. If the electronic health record information indicates another gender identity or if Legal Sex is recorded as \"Unknown\", both male and female reference intervals are provided where applicable, and should be considered according to the individual's appropriate clinical context.   CBC with platelets     Status: Abnormal   Result Value Ref Range    WBC Count 19.0 (H) 4.0 - 11.0 10e3/uL    RBC Count 4.39 3.80 - 5.90 10e6/uL    Hemoglobin 11.1 (L) 11.7 - 17.7 g/dL    Hematocrit 34.4 (L) 35.0 - 53.0 %    MCV 78 78 - 100 fL    MCH 25.3 (L) 26.5 - 33.0 pg    MCHC 32.3 31.5 - 36.5 g/dL    RDW 15.1 (H) 10.0 - 15.0 %    Platelet Count 244 150 - 450 10e3/uL    Narrative    The generation of reference intervals for this test is currently based on binary male or female sex. If the electronic health record information indicates another gender identity or if Legal Sex is recorded as \"Unknown\", both male and female reference intervals are provided where applicable, and should be considered according to the individual's appropriate clinical context.   Lactic acid whole blood     Status: Normal   Result Value Ref Range    Lactic Acid 1.4 0.7 - 2.0 mmol/L   Comprehensive metabolic panel     Status: Abnormal   Result Value Ref Range    Sodium 136 135 - 145 mmol/L    Potassium 3.4 3.4 - 5.3 mmol/L    Carbon Dioxide (CO2) 19 (L) 22 - 29 mmol/L    Anion Gap 15 7 - 15 mmol/L    Urea Nitrogen 17.3 6.0 - 20.0 mg/dL    Creatinine 0.98 0.51 - 1.17 mg/dL    GFR Estimate 83 >60 mL/min/1.73m2    Calcium 8.9 8.6 - 10.0 mg/dL    Chloride 102 98 - 107 mmol/L    Glucose 206 (H) 70 - 99 mg/dL    Alkaline Phosphatase 56 40 - 150 U/L    AST 26 0 - 45 U/L    ALT 10 0 - 70 U/L    Protein Total 7.1 " "6.4 - 8.3 g/dL    Albumin 4.1 3.5 - 5.2 g/dL    Bilirubin Total 0.3 <=1.2 mg/dL    Narrative    The generation of reference intervals for this test is currently based on binary male or female sex. If the electronic health record information indicates another gender identity or if Legal Sex is recorded as \"Unknown\", both male and female reference intervals are provided where applicable, and should be considered according to the individual's appropriate clinical context.   Nt probnp inpatient     Status: Abnormal   Result Value Ref Range    N terminal Pro BNP Inpatient 460 (H) 0 - 450 pg/mL   INR     Status: Abnormal   Result Value Ref Range    INR 1.27 (H) 0.85 - 1.15   Partial thromboplastin time     Status: Normal   Result Value Ref Range    aPTT 29 22 - 38 Seconds   Fibrinogen activity     Status: Normal   Result Value Ref Range    Fibrinogen Activity 208 170 - 490 mg/dL   CBC with platelets     Status: Abnormal   Result Value Ref Range    WBC Count 12.6 (H) 4.0 - 11.0 10e3/uL    RBC Count 4.44 3.80 - 5.90 10e6/uL    Hemoglobin 11.2 (L) 11.7 - 17.7 g/dL    Hematocrit 34.6 (L) 35.0 - 53.0 %    MCV 78 78 - 100 fL    MCH 25.2 (L) 26.5 - 33.0 pg    MCHC 32.4 31.5 - 36.5 g/dL    RDW 15.2 (H) 10.0 - 15.0 %    Platelet Count 211 150 - 450 10e3/uL    Narrative    The generation of reference intervals for this test is currently based on binary male or female sex. If the electronic health record information indicates another gender identity or if Legal Sex is recorded as \"Unknown\", both male and female reference intervals are provided where applicable, and should be considered according to the individual's appropriate clinical context.   EKG 12 lead     Status: None   Result Value Ref Range    Systolic Blood Pressure  mmHg    Diastolic Blood Pressure  mmHg    Ventricular Rate 100 BPM    Atrial Rate 100 BPM    HI Interval 120 ms    QRS Duration 134 ms     ms    QTc 472 ms    P Axis 30 degrees    R AXIS 100 degrees    T " Axis -72 degrees    Interpretation ECG       Sinus rhythm  Rightward axis  Non-specific intra-ventricular conduction block  Cannot rule out Anteroseptal infarct , age undetermined  T wave abnormality, consider inferior ischemia  Abnormal ECG  Unconfirmed report - interpretation of this ECG is computer generated - see medical record for final interpretation  Confirmed by - EMERGENCY ROOM, PHYSICIAN ([id number]),  TAMARA GONZÁLES ([id number]) on 1/3/2024 3:18:01 PM     Adult Type and Screen     Status: None   Result Value Ref Range    ABO/RH(D) A POS     Antibody Screen Negative Negative    SPECIMEN EXPIRATION DATE [id number]    Blood Culture Peripheral Blood     Status: Normal (Preliminary result)    Specimen: Peripheral Blood   Result Value Ref Range    Culture No growth after 12 hours    Blood Culture Peripheral Blood     Status: Normal (Preliminary result)    Specimen: Peripheral Blood   Result Value Ref Range    Culture No growth after 12 hours    CBC with platelets differential     Status: Abnormal    Narrative    The following orders were created for panel order CBC with platelets differential.  Procedure                               Abnormality         Status                     ---------                               -----------         ------                     CBC with platelets and d...[[id number]]  Abnormal            Final result                 Please view results for these tests on the individual orders.   ABO/Rh type and screen     Status: None    Narrative    The following orders were created for panel order ABO/Rh type and screen.  Procedure                               Abnormality         Status                     ---------                               -----------         ------                     Adult Type and Screen[[id number]]                            Final result                 Please view results for these tests on the individual orders.     Medications   sodium chloride 0.9% BOLUS  1,000 mL (0 mLs Intravenous Stopped 1/3/24 1905)   iopamidol (ISOVUE-370) solution 90 mL (90 mLs Intravenous $Given 1/3/24 1637)   sodium chloride (PF) 0.9% PF flush 90 mL (90 mLs Intravenous $Given 1/3/24 1637)   fentaNYL (PF) (SUBLIMAZE) injection 12.5 mcg (12.5 mcg Intravenous $Given 1/3/24 1903)   ondansetron (ZOFRAN) injection 4 mg (4 mg Intravenous $Given 1/3/24 1944)   fentaNYL (PF) (SUBLIMAZE) injection 12.5 mcg (12.5 mcg Intravenous $Given 1/3/24 1944)   fentaNYL (PF) (SUBLIMAZE) injection 25 mcg (25 mcg Intravenous $Given 1/3/24 2114)     Labs Ordered and Resulted from Time of ED Arrival to Time of ED Departure   COMPREHENSIVE METABOLIC PANEL - Abnormal       Result Value    Sodium 137      Potassium 4.7      Carbon Dioxide (CO2) 24      Anion Gap 11      Urea Nitrogen 17.2      Creatinine 0.98      GFR Estimate 83      Calcium 9.9      Chloride 102      Glucose 117 (*)     Alkaline Phosphatase 64      AST 27      ALT 12      Protein Total 7.9      Albumin 4.4      Bilirubin Total 0.3     NT PROBNP INPATIENT - Abnormal    N terminal Pro BNP Inpatient 570 (*)    ROUTINE UA WITH MICROSCOPIC REFLEX TO CULTURE - Abnormal    Color Urine Yellow      Appearance Urine Slightly Cloudy (*)     Glucose Urine Negative      Bilirubin Urine Negative      Ketones Urine Negative      Specific Gravity Urine 1.019      Blood Urine Negative      pH Urine 5.5      Protein Albumin Urine 50 (*)     Urobilinogen Urine Normal      Nitrite Urine Negative      Leukocyte Esterase Urine Negative      Mucus Urine Present (*)     RBC Urine 1      WBC Urine 4      Squamous Epithelials Urine 8 (*)     Hyaline Casts Urine 2     CBC WITH PLATELETS AND DIFFERENTIAL - Abnormal    WBC Count 9.0      RBC Count 4.82      Hemoglobin 12.2      Hematocrit 37.3      MCV 77 (*)     MCH 25.3 (*)     MCHC 32.7      RDW 15.1 (*)     Platelet Count 221      % Neutrophils 56      % Lymphocytes 30      % Monocytes 7      % Eosinophils 7      % Basophils 0       % Immature Granulocytes 0      NRBCs per 100 WBC 0      Absolute Neutrophils 5.0      Absolute Lymphocytes 2.7      Absolute Monocytes 0.6      Absolute Eosinophils 0.6      Absolute Basophils 0.0      Absolute Immature Granulocytes 0.0      Absolute NRBCs 0.0     CBC WITH PLATELETS - Abnormal    WBC Count 19.0 (*)     RBC Count 4.39      Hemoglobin 11.1 (*)     Hematocrit 34.4 (*)     MCV 78      MCH 25.3 (*)     MCHC 32.3      RDW 15.1 (*)     Platelet Count 244     COMPREHENSIVE METABOLIC PANEL - Abnormal    Sodium 136      Potassium 3.4      Carbon Dioxide (CO2) 19 (*)     Anion Gap 15      Urea Nitrogen 17.3      Creatinine 0.98      GFR Estimate 83      Calcium 8.9      Chloride 102      Glucose 206 (*)     Alkaline Phosphatase 56      AST 26      ALT 10      Protein Total 7.1      Albumin 4.1      Bilirubin Total 0.3     NT PROBNP INPATIENT - Abnormal    N terminal Pro BNP Inpatient 460 (*)    INR - Abnormal    INR 1.27 (*)    CBC WITH PLATELETS - Abnormal    WBC Count 12.6 (*)     RBC Count 4.44      Hemoglobin 11.2 (*)     Hematocrit 34.6 (*)     MCV 78      MCH 25.2 (*)     MCHC 32.4      RDW 15.2 (*)     Platelet Count 211     TROPONIN T, HIGH SENSITIVITY - Normal    Troponin T, High Sensitivity <6     HCG QUALITATIVE PREGNANCY - Normal    hCG Serum Qualitative Negative     INFLUENZA A/B, RSV, & SARS-COV2 PCR - Normal    Influenza A PCR Negative      Influenza B PCR Negative      RSV PCR Negative      SARS CoV2 PCR Negative     LACTIC ACID WHOLE BLOOD - Normal    Lactic Acid 1.0     HCG QUALITATIVE URINE - Normal    hCG Urine Qualitative Negative     ISTAT CREATININE POCT - Normal    Creatinine POCT 0.9      GFR, ESTIMATED POCT >60     LACTIC ACID WHOLE BLOOD - Normal    Lactic Acid 1.4     PARTIAL THROMBOPLASTIN TIME - Normal    aPTT 29     FIBRINOGEN ACTIVITY - Normal    Fibrinogen Activity 208     TYPE AND SCREEN, ADULT    ABO/RH(D) A POS      Antibody Screen Negative      SPECIMEN EXPIRATION DATE  [id number]     ABO/RH TYPE AND SCREEN     CTA Chest Abdomen Pelvis w Contrast   Preliminary Result   RESIDENT PRELIMINARY INTERPRETATION   IMPRESSION:    1. Acute proximal descending aorta thoracic aortic aneurysm measuring   up to 5.4 cm, with mural irregularity suggesting adherent thrombus.   This is located just distal to the left subclavian artery origin. No   discrete aortic dissection is present. Recommend vascular surgery   consult.   2. There are multifocal arterial thrombi present, for example   associated with the aortic stent at the level of the celiac artery, in   the proximal right renal artery, in the distal superior mesenteric   artery, and in the infrarenal abdominal aorta extending into the right   greater than left common iliac arteries. There are no imaging findings   suggesting bowel ischemia.   3. Large hematoma in the right lower abdominal quadrant measuring up   to 7.4 cm. No active extravasation.   4. Post surgical changes of cardiac transplantation. Stable size of   the aortic anastomosis at the isthmus.   5. Emphysematous changes greatest in the left lower lobe.      These findings were communicated to IVONNE Gómez by Dr. Efrem Solorzano at 1/3/2024 5:26 PM via telephone and understanding was   verbalized. Findings also discussed with general surgery at 5:41 PM.      XR Chest 2 Views   Final Result   Abnormal   Impression: Enlarging silhouette of the aortic knob concerning for   aortic aneurysm versus postoperative pseudoaneurysm, recommend   obtaining urgent gated CT angiogram of thoracic aorta.   [Urgent Result: Enlarging aorta, concern for enlarging   aneurysm]/pseudoaneurysm      Finding was identified on 1/3/2024 3:23 PM.       Tiffany Alvarado was contacted by Dr. Keerthi Rosenbaum at 1/3/2024 3:33   PM and verbalized understanding of the urgent finding.       I have personally reviewed the examination and initial interpretation   and I agree with the findings.      MALLORY MARTINEZ  MD JOHN            SYSTEM ID:  [id number]      POC US ECHO LIMITED    (Results Pending)          Critical care was performed.   Critical Care Addendum  My initial assessment, based on my discussion with radiology, vascular surgery and interpretation of chest x-ray, CTA , established a high suspicion that Laurel Santoyo has Life-threatening large aortic aneurysm with associated thrombus and acute bowel ischemia which requires immediate intervention, and therefore Laurel Santoyo is critically ill.     After the initial assessment, the care team initiated multiple lab tests, initiated medication therapy with heparin, nicardipine, and consulted with vascular surgery  to provide stabilization care. Due to the critical nature of this patient, I reassessed vital signs multiple times prior to Laurel Santoyo's disposition.     Time also spent performing discussion with consultants and coordination of care.     Critical care time (excluding teaching time and procedures): 100 minutes.     Assessment & Plan    This is a medically complex 23-year-old adult presenting with concerns for chest pain, dyspnea, along with abrupt onset hot flash and bilateral leg pain, now improved on presentation to the emergency department.  Initial presentation, patient is mildly tachycardic heart 110.  Initial blood pressure 132/90.  Patient is hypoxic on room air.  On exam patient has reassuring breath sounds, which are equal.  They have no focal tenderness to palpation of the abdomen have reassuring lower extremities on exam.  They have no focal neurologic deficits.  Broad differential considered includes possibility of infectious cause, ACS, pulmonary embolism, pneumonia amongst others.  Will initiate broad workup including infectious workup with lactic acid blood cultures chest x-ray UA COVID testing in addition to cardiac workup EKG troponin BNP.,  Will hold off with D-dimer at this time, lower suspicion clinically of pulmonary  embolism with clinical picture and constellation of symptoms.    Initial EKG was obtained, interpreted by myself I do not appreciate any visible findings for other new arrhythmias or findings just of ischemia there are some nonspecific T wave changes noted in the lateral leads.  CBC without leukocytosis or anemia, chemistry within normal limits.  Initial troponin within normal range suggesting against ACS.  Initial BNP slightly elevated 570.  Pregnancy was obtained and is negative.  UA reviewed does not show findings for pyelonephritis.  Respiratory swab negative.  Blood cultures pending.    I was notified by radiology in regards to the chest x-ray which I did independently interpreted by myself.  I do appreciate increase in size of the aortic knob noted compared to prior radiologist noted these findings and recommend CTA of the thoracic ordered to further evaluate with possible concern of aortic aneurysm versus postop pseudoaneurysm.  Subsequent CTA chest abdomen pelvis ordered.  I was notified concerning results from the radiologist including findings of descending aortic thoracic aneurysm measuring up to 5.4 cm suggestive of adherent thrombus without findings for dissection or rupture.  Radiologist read also notes multifocal arterial thrombus in the celiac artery, proximal right renal artery and distal superior mesenteric artery without findings for bowel ischemia at time of imaging.  Of note radiology also notes a 7.4 cm large right hematoma in the abdomen from unclear significance.  Patient is only on aspirin she is not anticoagulated and has no history of trauma.    With these worrisome findings, I did immediately paged the general surgery and on-call who evaluated patient at bedside.  They spoke with vascular surgery, patient was evaluated by the vascular surgeon attending here in the department.  They recommend nicardipine which was initiated, maintaining systolic blood pressures under 120.  Heparin also  initiated, recommendations from vascular surgery attending staff.  Patient required pain control with fentanyl during her ED course.  With patient's complexity, the vascular surgery team here at the [place] recommends transfer to the [facility] system where there is availability for intervention.    Vascular surgery spoke with both the vascular surgeon from [place], Dr. Mcdowell, as well as the ED physician Dr.Schoen, who is accepting the patient.  Patient will be ED to ED transfer via air.  I did discuss this with her, she is agreeable to ER transfer to [place].    Patient seen and discussed with attending physicians  and Dr. Sanchez, who agrees with my plan of care.      I have reviewed the nursing notes. I have reviewed the findings, diagnosis, plan and need for follow up with the patient.    Discharge Medication List as of 1/3/2024 10:30 PM          Final diagnoses:   Descending aortic aneurysm (H24)   Arterial thrombosis (H)   Abdominal hematoma   History of heart transplant (H)       Tiffany Alvarado [facility] EMERGENCY DEPARTMENT  1/3/2024    --    ED Attending Physician Attestation    I Will Rodriguez MD, cared for this patient with the Advanced Practice Provider (JEFFERY). I have performed a history and physical examination of the patient independent of the JEFFERY. I reviewed the JEFFERY's documentation above and agree with the documented findings and plan of care. I personally provided a substantive portion of the care for this patient, including the complete Medical Decision Making. Please see the JEFFERY's documentation for full details.    Summary of HPI, PE, ED Course   Patient is a 23 year old adult evaluated in the emergency department for left shoulder pain, hot flashes, sob. Exam and ED course notable for cxr noted thus CTA shows acute prox descending aortic aneurysm with also arterial thrombus distal. Consultation with CV surgery with nicardipine and heparin recommended patient  vss patien transfer per [facility] team to [place] for definitive treatment. Had discussed with [facility] staff also. After the completion of care in the emergency department, the patient was transferred to [place] who accepted patient. .        Will Rodriguez MD  Emergency Medicine     This note was created at least in part by the use of dragon voice dictation system. Inadvertent typographical errors may still exist.  Will Rodriguez MD.  Patient evaluated in the emergency department during the COVID-19 pandemic period. Careful attention to patients safety was addressed throughout the evaluation. Evaluation and treatment management was initiated with disposition made efficiently and appropriate as possible to minimize any risk of potential exposure to patient during this evaluation.         Will Rodriguez MD  01/04/24 0759

## 2024-01-03 NOTE — ED TRIAGE NOTES
"Triage Assessment & Note:    BP (!) 132/90   Pulse 110   Temp 98.4  F (36.9  C) (Oral)   Resp 18   Ht 1.651 m (5' 5\")   Wt 59 kg (130 lb)   SpO2 97%   BMI 21.63 kg/m      Patient presents with: C/o hot flashes in the setting of heart txp and left shoulder pain. PT was advised to come to the ED for evaluation by transplant coordinator.     Home Treatments/Remedies: None    Febrile / Afebrile? Afebrile     Duration of C/o:  1 day    Henrique Banda RN  January 3, 2024       Triage Assessment (Adult)       Row Name 01/03/24 6035          Triage Assessment    Airway WDL WDL        Respiratory WDL    Respiratory WDL WDL        Cardiac WDL    Cardiac WDL WDL                     "

## 2024-01-04 NOTE — PROGRESS NOTES
Vascular Surgery Progress Note    Please see note by Luis Duffy 1/3 for full consult note.     Laurel Santoyo is a 22 yo adult with complex cardiac history  including double inlet left ventricle, transposition of the great vessels, s/p multiple previous cardiac interventions and then heart transplant in 2004 c/b subaortic obstruction requiring septal myotomy and Stoll procedure in 2008 as well as aortic arch narrowing s/p balloon dilation and stent placement of proximal descending aorta in 2012. Aortic stent had migrated by late 2012 and is now seated at level of celiac trunk.     She presented with acute onset chest pain over the past 1-2 days and acute onset today BLE weakness/numbness and tingling of her hips/thighs. This has improved. Does report she has pain in her right hip with walking that gets better with rest still.     She was found to have aneurysmal descending thoracic aorta up to 5.4cm with mural thrombus with emboli to multiple arterial beds including her celiac artery, SMA, right renal artery, and distal aorta into the bilateral iliac arteries.     She currently reports acute back pain and abdominal pain which started about 30 minutes prior to my seeing her.    On exam - she is tachycardic to 110s and SBP 110s systolic on nicardipine drip.     Gen: She is in acute distress, unable to speak due to severe pain, tossing and turning from side to side, does sign words to me and she finds that easier.  Resp: NLB on RA   CV: Tachycardic regular rhythm per tele   Abd: Abdomen is tender to palpation in the lower midline abdomen. Soft, nondistended, no guarding or rigidity  Midline back pain to palpation at the lumbar level.   Vascular: She has strong monophasic signals in b/l femoral arteries. As well as strong PT signals in BLE. Feet are warm to touch, though pale appearing.     Labs:   WBC: 19 (was 9 3 hours prior)   Lactic: 1.4 (1)   Cr: 0.98      A/P : Laurel Santoyo is a 23 year old adult with complex  history at above found to have extensive embolic events as well as thoracic aortic aneurysm. She is at risk for and should be presumed to have bowel ischemia given known SMA thrombus as well abdominal pain and increasing white count. She also has bilateral iliac artery occlusions and complains of RLE thigh/buttock claudication while walking. At this point, it is not clear if her emboli came from her aortic aneurysm or possibly from previous stent graft which was left in situ. She will require at minimum SMA thrombectomy, bilateral iliac thrombectomies, as well as possibly larger open aortic reconstruction. At this time we do not have the capabilites at the [place] to handle this case given hybrid room is currently being renovated as well as complex nature of case given patients anatomy. Discussed with patient and mother very serious nature of her condition and concern that any intervention would be incredibly high risk for further embolization events.     - emergent transfer to [facility]   - heparin drip  - SBP <120     Seen and discussed with Dr. Hurtado, vascular surgery staff

## 2024-02-14 ENCOUNTER — POST MORTEM DOCUMENTATION (OUTPATIENT)
Dept: TRANSPLANT | Facility: CLINIC | Age: 24
End: 2024-02-14
Payer: COMMERCIAL

## 2024-02-14 NOTE — PROGRESS NOTES
Received notification on  at 1200 of patient's death from [facility], contact information is Mother (Anat).  Place of death was reported as [facility].  Graft status at the time of death was reported as Functioning.  TIS verification is: Pending  The Transplant Office has been notified that patient is . The Post Mortem Encounter has been completed. Notifications have been sent to the Care team, Donor team, Immunology lab, and Social Work.   Instructions have been sent to cancel pending appointments, discontinue pending orders, eliminate paper chart, and send a sympathy card to the family.     NENA CASTAÑEDA  Received notification of patient's death from Lubna Schwab RN.  Place of death was reported as [facility].  Graft status at the time of death was reported as Functioning  TIS verification is: Complete

## 2024-05-31 PROBLEM — B37.9 CANDIDA INFECTION: Status: ACTIVE | Noted: 2024-01-01

## 2024-05-31 PROBLEM — D47.Z1 PTLD (POST-TRANSPLANT LYMPHOPROLIFERATIVE DISORDER) (H): Status: ACTIVE | Noted: 2024-01-01

## (undated) DEVICE — MANIFOLD KIT ANGIO AUTOMATED 014613

## (undated) DEVICE — PACK HEART LEFT CUSTOM

## (undated) DEVICE — INTRO GLIDESHEATH SLENDER 6FR 10X45CM 60-1060

## (undated) DEVICE — Device

## (undated) DEVICE — TUBING PRESSURE 30"

## (undated) DEVICE — FASTENER CATH BALLOON CLAMPX2 STATLOCK 0684-00-493

## (undated) DEVICE — KIT HAND CONTROL ACIST 016795

## (undated) DEVICE — SLEEVE TR BAND RADIAL COMPRESSION DEVICE 24CM TRB24-REG

## (undated) DEVICE — CONNECTOR STOPCOCK 3-WAY HIGH PRESSURE (NAMIC) H965700550091

## (undated) DEVICE — INTRO SHEATH 7FRX10CM PINNACLE RSS702

## (undated) RX ORDER — NICARDIPINE HCL-0.9% SOD CHLOR 1 MG/10 ML
SYRINGE (ML) INTRAVENOUS
Status: DISPENSED
Start: 2023-01-01

## (undated) RX ORDER — AMINOPHYLLINE 25 MG/ML
INJECTION, SOLUTION INTRAVENOUS
Status: DISPENSED
Start: 2022-04-13

## (undated) RX ORDER — LIDOCAINE 40 MG/G
CREAM TOPICAL
Status: DISPENSED
Start: 2023-01-01

## (undated) RX ORDER — HEPARIN SODIUM 1000 [USP'U]/ML
INJECTION, SOLUTION INTRAVENOUS; SUBCUTANEOUS
Status: DISPENSED
Start: 2023-01-01

## (undated) RX ORDER — SODIUM CHLORIDE 9 MG/ML
INJECTION, SOLUTION INTRAVENOUS
Status: DISPENSED
Start: 2023-01-01

## (undated) RX ORDER — FENTANYL CITRATE 50 UG/ML
INJECTION, SOLUTION INTRAMUSCULAR; INTRAVENOUS
Status: DISPENSED
Start: 2023-01-01

## (undated) RX ORDER — REGADENOSON 0.08 MG/ML
INJECTION, SOLUTION INTRAVENOUS
Status: DISPENSED
Start: 2022-04-13

## (undated) RX ORDER — LIDOCAINE HYDROCHLORIDE 10 MG/ML
INJECTION, SOLUTION EPIDURAL; INFILTRATION; INTRACAUDAL; PERINEURAL
Status: DISPENSED
Start: 2023-01-01

## (undated) RX ORDER — NITROGLYCERIN 5 MG/ML
VIAL (ML) INTRAVENOUS
Status: DISPENSED
Start: 2023-01-01